# Patient Record
Sex: MALE | Race: BLACK OR AFRICAN AMERICAN | Employment: FULL TIME | URBAN - METROPOLITAN AREA
[De-identification: names, ages, dates, MRNs, and addresses within clinical notes are randomized per-mention and may not be internally consistent; named-entity substitution may affect disease eponyms.]

---

## 2017-01-18 ENCOUNTER — OFFICE VISIT (OUTPATIENT)
Dept: SLEEP MEDICINE | Age: 67
End: 2017-01-18

## 2017-01-18 VITALS
SYSTOLIC BLOOD PRESSURE: 114 MMHG | HEIGHT: 69 IN | DIASTOLIC BLOOD PRESSURE: 69 MMHG | WEIGHT: 177 LBS | OXYGEN SATURATION: 97 % | HEART RATE: 77 BPM | BODY MASS INDEX: 26.22 KG/M2 | TEMPERATURE: 97.9 F

## 2017-01-18 DIAGNOSIS — R41.3 POOR SHORT TERM MEMORY: ICD-10-CM

## 2017-01-18 DIAGNOSIS — G47.00 INSOMNIA, UNSPECIFIED TYPE: ICD-10-CM

## 2017-01-18 DIAGNOSIS — G47.33 OSA (OBSTRUCTIVE SLEEP APNEA): Primary | ICD-10-CM

## 2017-01-18 DIAGNOSIS — G31.84 MILD COGNITIVE IMPAIRMENT: ICD-10-CM

## 2017-01-18 NOTE — PATIENT INSTRUCTIONS
[address] [facility] [place]. [place], [address]  Tel.  [phone]  Fax. 100 [facility] 60  [place], [address] [facility]  Tel.  [phone]  Fax. [phone] [address]  Tel.  [phone]  Fax. [phone]     Sleep Apnea: After Your Visit  Your Care Instructions  Sleep apnea occurs when you frequently stop breathing for 10 seconds or longer during sleep. It can be mild to severe, based on the number of times per hour that you stop breathing or have slowed breathing. Blocked or narrowed airways in your nose, mouth, or throat can cause sleep apnea. Your airway can become blocked when your throat muscles and tongue relax during sleep. Sleep apnea is common, occurring in 1 out of 20 individuals. Individuals having any of the following characteristics should be evaluated and treated right away due to high risk and detrimental consequences from untreated sleep apnea:  1. Obesity  2. Congestive Heart failure  3. Atrial Fibrillation  4. Uncontrolled Hypertension  5. Type II Diabetes  6. Night-time Arrhythmias  7. Stroke  8. Pulmonary Hypertension  9. High-risk Driving Populations (pilots, truck drivers, etc.)  10. Patients Considering Weight-loss Surgery    How do you know you have sleep apnea? You probably have sleep apnea if you answer 'yes' to 3 or more of the following questions:  S - Have you been told that you Snore? T - Are you often Tired during the day? O - Has anyone Observed you stop breathing while sleeping? P- Do you have (or are being treated for) high blood Pressure? B - Are you obese (Body Mass Index > 35)? A - Is your Age 48years old or older? N - Is your Neck size greater than 16 inches? G - Are you male Gender? A sleep physician can prescribe a breathing device that prevents tissues in the throat from blocking your airway.  Or your doctor may recommend using a dental device (oral breathing device) to help keep your airway open. In some cases, surgery may be needed to remove enlarged tissues in the throat. Follow-up care is a key part of your treatment and safety. Be sure to make and go to all appointments, and call your doctor if you are having problems. It's also a good idea to know your test results and keep a list of the medicines you take. How can you care for yourself at home? · Lose weight, if needed. It may reduce the number of times you stop breathing or have slowed breathing. · Go to bed at the same time every night. · Sleep on your side. It may stop mild apnea. If you tend to roll onto your back, sew a pocket in the back of your pajama top. Put a tennis ball into the pocket, and stitch the pocket shut. This will help keep you from sleeping on your back. · Avoid alcohol and medicines such as sleeping pills and sedatives before bed. · Do not smoke. Smoking can make sleep apnea worse. If you need help quitting, talk to your doctor about stop-smoking programs and medicines. These can increase your chances of quitting for good. · Prop up the head of your bed 4 to 6 inches by putting bricks under the legs of the bed. · Treat breathing problems, such as a stuffy nose, caused by a cold or allergies. · Use a continuous positive airway pressure (CPAP) breathing machine if lifestyle changes do not help your apnea and your doctor recommends it. The machine keeps your airway from closing when you sleep. · If CPAP does not help you, ask your doctor whether you should try other breathing machines. A bilevel positive airway pressure machine has two types of air pressureâone for breathing in and one for breathing out. Another device raises or lowers air pressure as needed while you breathe. · If your nose feels dry or bleeds when using one of these machines, talk with your doctor about increasing moisture in the air. A humidifier may help.   · If your nose is runny or stuffy from using a breathing machine, talk with your doctor about using decongestants or a corticosteroid nasal spray. When should you call for help? Watch closely for changes in your health, and be sure to contact your doctor if:  · You still have sleep apnea even though you have made lifestyle changes. · You are thinking of trying a device such as CPAP. · You are having problems using a CPAP or similar machine. Where can you learn more? Go to [employer]. Enter N717 in the search box to learn more about \"Sleep Apnea: After Your Visit. \"   © 4156-0603 Healthwise, [employer]. Care instructions adapted under license by [employer] (which disclaims liability or warranty for this information). This care instruction is for use with your licensed healthcare professional. If you have questions about a medical condition or this instruction, always ask your healthcare professional. [place] [employer] any warranty or liability for your use of this information. PROPER SLEEP HYGIENE    What to avoid  · Do not have drinks with caffeine, such as coffee or black tea, for 8 hours before bed. · Do not smoke or use other types of tobacco near bedtime. Nicotine is a stimulant and can keep you awake. · Avoid drinking alcohol late in the evening, because it can cause you to wake in the middle of the night. · Do not eat a big meal close to bedtime. If you are hungry, eat a light snack. · Do not drink a lot of water close to bedtime, because the need to urinate may wake you up during the night. · Do not read or watch TV in bed. Use the bed only for sleeping and sexual activity. What to try  · Go to bed at the same time every night, and wake up at the same time every morning. Do not take naps during the day. · Keep your bedroom quiet, dark, and cool. · Get regular exercise, but not within 3 to 4 hours of your bedtime. .  · Sleep on a comfortable pillow and mattress.   · If watching the clock makes you anxious, turn it facing away from you so you cannot see the time. · If you worry when you lie down, start a worry book. Well before bedtime, write down your worries, and then set the book and your concerns aside. · Try meditation or other relaxation techniques before you go to bed. · If you cannot fall asleep, get up and go to another room until you feel sleepy. Do something relaxing. Repeat your bedtime routine before you go to bed again. · Make your house quiet and calm about an hour before bedtime. Turn down the lights, turn off the TV, log off the computer, and turn down the volume on music. This can help you relax after a busy day. Drowsy Driving  The [address] [place] [address] Road Traffic Safety Administration cites drowsiness as a causing factor in more than 748,075 police reported crashes annually, resulting in 76,000 injuries and 1,500 deaths. Other surveys suggest 55% of people polled have driven while drowsy in the past year, 23% had fallen asleep but not crashed, 3% crashed, and 2% had and accident due to drowsy driving. Who is at risk? Young Drivers: One study of drowsy driving accidents states that 55% of the drivers were under 25 years. Of those, 75% were male. Shift Workers and Travelers: People who work overnight or travel across time zones frequently are at higher risk of experiencing Circadian Rhythm Disorders. They are trying to work and function when their body is programed to sleep. Sleep Deprived: Lack of sleep has a serious impact on your ability to pay attention or focus on a task. Consistently getting less than the average of 8 hours your body needs creates partial or cumulative sleep deprivation. Untreated Sleep Disorders: Sleep Apnea, Narcolepsy, R.L.S., and other sleep disorders (untreated) prevent a person from getting enough restful sleep. This leads to excessive daytime sleepiness and increases the risk for drowsy driving accidents by up to 7 times.   Medications / Alcohol: Even over the counter medications can cause drowsiness. Medications that impair a drivers attention should have a warning label. Alcohol naturally makes you sleepy and on its own can cause accidents. Combined with excessive drowsiness its effects are amplified. Signs of Drowsy Driving:   * You don't remember driving the last few miles   * You may drift out of your lindsay   * You are unable to focus and your thoughts wander   * You may yawn more often than normal   * You have difficulty keeping your eyes open / nodding off   * Missing traffic signs, speeding, or tailgating  Prevention-   Good sleep hygiene, lifestyle and behavioral choices have the most impact on drowsy driving. There is no substitute for sleep and the average person requires 8 hours nightly. If you find yourself driving drowsy, stop and sleep. Consider the sleep hygiene tips provided during your visit as well. Medication Refill Policy: Refills for all medications require 1 week advance notice. Please have your pharmacy fax a refill request. We are unable to fax, or call in \"controled substance\" medications and you will need to pick these prescriptions up from our office. [employer] Activation    Thank you for requesting access to [employer]. Please follow the instructions below to securely access and download your online medical record. [employer] allows you to send messages to your doctor, view your test results, renew your prescriptions, schedule appointments, and more. How Do I Sign Up? 1. In your internet browser, go to [email or web address]. 2. Click on the First Time User? Click Here link in the Sign In box. You will see the New Member Sign Up page. 3. Enter your [employer] Access Code exactly as it appears below. You will not need to use this code after youve completed the sign-up process. If you do not sign up before the expiration date, you must request a new code.     [employer] Access Code: [email or web address]  Expires: 2/27/2017  2:52 PM (This is the date your [employer] access code will )    4. Enter the last four digits of your Social Security Number (xxxx) and Date of Birth (mm/dd/yyyy) as indicated and click Submit. You will be taken to the next sign-up page. 5. Create a [employer] ID. This will be your [employer] login ID and cannot be changed, so think of one that is secure and easy to remember. 6. Create a [employer] password. You can change your password at any time. 7. Enter your Password Reset Question and Answer. This can be used at a later time if you forget your password. 8. Enter your e-mail address. You will receive e-mail notification when new information is available in [address] 9. Click Sign Up. You can now view and download portions of your medical record. 10. Click the Download Summary menu link to download a portable copy of your medical information. Additional Information    If you have questions, please call [phone]. Remember, [employer] is NOT to be used for urgent needs. For medical emergencies, dial 911.

## 2017-01-18 NOTE — PROGRESS NOTES
[address] [facility] [address] [place]. [place], [address]  Tel.  [phone]  Fax. 100 [facility] 60  [place], [address] [facility]  Tel.  [phone]  Fax. [phone] [address] [place] [address] [place]  Tel.  [phone]  Fax. [phone]         Subjective:      Nydia Raines is an 61 y.o. male referred for evaluation for a sleep disorder. He complains of snoring associated with snorting, periods of not breathing. Symptoms began several years ago, unchanged since that time. He usually can fall asleep in 5 minutes. Family or house members note snoring, snorting, periods of not breathing. He denies completely or partially paralyzed while falling asleep or waking up. Nydia Raines does not wake up frequently at night. He is not bothered by waking up too early and left unable to get back to sleep. He actually sleeps about 5-6 hours at night and wakes up about 0-1 times during the night. He does not work shifts: Jeremiah Reasoner Ferol Lennox indicates he does get too little sleep at night. His bedtime is 2300. He awakens at 0500. He does take naps. He takes 7 naps a week lasting 1, 2 hours. He will have unintentional naps in the evening after return from work while viewing television. These have been occurring in the past 3 years and have been attributed to the process of aging. He has the following observed behaviors: Loud snoring, Light snoring, Pauses in breathing;  . Other remarks: waking with a gasp or snort. Patient was referred by neurology for evaluation due to history of snoring and cognitive impairment. He came in with the notion that he was scheduled for a sleep test tonight at this sleep center at 10:00 pm and insisted that he has paper work regarding this and it was left at home. Sleep testing is not performed at this center on Wednesday nights.  He later realized that this was incorrect and that the paper work he had turned in was for a consultation appointment and not a sleep test.    [place] Sleepiness Score: 4     No Known Allergies      Current Outpatient Prescriptions:     levothyroxine (SYNTHROID) 88 mcg tablet, Take 88 mcg by mouth Daily (before breakfast). , Disp: , Rfl:     atorvastatin (LIPITOR) 20 mg tablet, Take 20 mg by mouth nightly., Disp: , Rfl:      He  has a past medical history of Hypercholesteremia and Thyroid disease. He  has no past surgical history on file. He family history includes Cancer in his mother; No Known Problems in his father. He  reports that he has never smoked. He does not have any smokeless tobacco history on file. Review of Systems:  Constitutional:  No significant weight loss or weight gain  Eyes:  No blurred vision. CVS:  No significant chest pain  Pulm:  No significant shortness of breath  GI:  No significant nausea or vomiting  :  No significant nocturia  Musculoskeletal:  No significant joint pain at night  Skin:  No significant rashes  Neuro:  No significant dizziness   Psych:  No active mood issues    Sleep Review of Systems: notable for no difficulty falling asleep; infrequent awakenings at night;  regular dreaming noted; no nightmares ; no early morning headaches;  memory problems; no concentration issues; no history of any automobile or occupational accidents due to daytime drowsiness. Objective:     Visit Vitals    /69    Pulse 77    Temp 97.9 °F (36.6 °C)    Ht 5' 9\" (1.753 m)    Wt 177 lb (80.3 kg)    SpO2 97%    BMI 26.14 kg/m2         General:   Not in acute distress   Eyes:  Anicteric sclerae, no obvious strabismus   Nose:  No obvious nasal septum deviation    Oropharynx:   Class 4 oropharyngeal outlet, thick tongue base, uvula could not be seen due to low-lying soft palate, narrow tonsilo-pharyngeal pilars   Tonsils:   tonsils are not seen due to low-lying soft palate   Neck:   Neck circ.  in \"inches\": 15; midline trachea   Chest/Lungs:  Equal lung expansion, clear on auscultation    CVS:  Normal rate, regular rhythm; no JVD   Skin:  Warm to touch; no obvious rashes   Neuro:  No focal deficits ; no obvious tremor    Psych:  Normal affect,  normal countenance;          Assessment:       ICD-10-CM ICD-9-CM    1. JOSE CARLOS (obstructive sleep apnea) G47.33 327.23 POLYSOMNOGRAPHY 1 NIGHT      CANCELED: SLEEP STUDY UNATTENDED, 4 CHANNEL   2. Insomnia, unspecified type G47.00 780.52    3. Mild cognitive impairment G31.84 331.83 POLYSOMNOGRAPHY 1 NIGHT   4. Poor short term memory R41.3 780.93    5. BMI 26.0-26.9,adult Z68.26 V85.22          Plan:     * The patient currently has a Moderate Risk for having sleep apnea. STOP-BANG score 4.  * Attended Sleep testing was ordered for initial evaluation due to memory and cognitive impairment issues. * He was provided information on sleep apnea including coresponding risk factors and the importance of proper treatment. * Treatment options if indicated were reviewed today. Patient agrees to a trial of PAP therapy if indicated. * Counseling was provided regarding proper sleep hygiene (including effect of light on sleep) and safe driving. * Effect of sleep disturbance on weight was reviewed. We have recommended a dedicated weight loss through appropriate diet and an exercise regiment as significant weight reduction has been shown to reduce severity of obstructive sleep apnea. * Telephone [phone]  follow-up shortly after sleep study to review results and plan final management.     (patient has given permission for a message to be left regarding test results and further management if patient cannot be cannot be reached directly). Thank you for allowing us to participate in your patient's medical care. We'll keep you updated on these investigations. Nance Duverney, MD, FAASM  Diplomate American Board of Sleep Medicine  Diplomate in Sleep Medicine - ABP  Electronically signed.

## 2017-02-24 ENCOUNTER — HOSPITAL ENCOUNTER (OUTPATIENT)
Dept: SLEEP MEDICINE | Age: 67
Discharge: HOME | End: 2017-02-24
Payer: COMMERCIAL

## 2017-02-24 DIAGNOSIS — G31.84 MILD COGNITIVE IMPAIRMENT: ICD-10-CM

## 2017-02-24 DIAGNOSIS — G47.33 OSA (OBSTRUCTIVE SLEEP APNEA): ICD-10-CM

## 2017-02-24 PROCEDURE — 95810 POLYSOM 6/> YRS 4/> PARAM: CPT

## 2017-02-25 VITALS
SYSTOLIC BLOOD PRESSURE: 124 MMHG | TEMPERATURE: 98.5 F | OXYGEN SATURATION: 98 % | DIASTOLIC BLOOD PRESSURE: 80 MMHG | HEIGHT: 69 IN | HEART RATE: 85 BPM | BODY MASS INDEX: 26.16 KG/M2 | WEIGHT: 176.6 LBS

## 2017-02-28 ENCOUNTER — TELEPHONE (OUTPATIENT)
Dept: SLEEP MEDICINE | Age: 67
End: 2017-02-28

## 2017-02-28 DIAGNOSIS — R06.83 SNORING: Primary | ICD-10-CM

## 2017-03-02 ENCOUNTER — DOCUMENTATION ONLY (OUTPATIENT)
Dept: SLEEP MEDICINE | Age: 67
End: 2017-03-02

## 2017-03-02 ENCOUNTER — TELEPHONE (OUTPATIENT)
Dept: SLEEP MEDICINE | Age: 67
End: 2017-03-02

## 2017-03-29 ENCOUNTER — OFFICE VISIT (OUTPATIENT)
Dept: NEUROLOGY | Age: 67
End: 2017-03-29

## 2017-03-29 VITALS
WEIGHT: 180.2 LBS | SYSTOLIC BLOOD PRESSURE: 118 MMHG | DIASTOLIC BLOOD PRESSURE: 60 MMHG | OXYGEN SATURATION: 98 % | BODY MASS INDEX: 26.69 KG/M2 | HEART RATE: 83 BPM | HEIGHT: 69 IN

## 2017-03-29 DIAGNOSIS — E03.9 HYPOTHYROIDISM, UNSPECIFIED TYPE: ICD-10-CM

## 2017-03-29 DIAGNOSIS — R06.83 SNORING: ICD-10-CM

## 2017-03-29 DIAGNOSIS — G31.84 MILD COGNITIVE IMPAIRMENT: Primary | ICD-10-CM

## 2017-03-29 DIAGNOSIS — R73.03 PREDIABETES: ICD-10-CM

## 2017-03-29 NOTE — MR AVS SNAPSHOT
Visit Information Date & Time Provider Department Dept. Phone Encounter #  
 3/29/2017  2:40 PM Apoorva Catalan MD [facility] [phone] [id number] Upcoming Health Maintenance Date Due Hepatitis C Screening 1950 DTaP/Tdap/Td series (1 - Tdap) 7/23/1973 FOBT Q 1 YEAR AGE 50-75 7/23/2002 ZOSTER VACCINE AGE 60> 7/23/2012 INFLUENZA AGE 9 TO ADULT 8/1/2016 Allergies as of 3/29/2017  Review Complete On: 3/29/2017 By: Ave Catalan No Known Allergies Current Immunizations  Never Reviewed No immunizations on file. Not reviewed this visit Vitals BP Pulse Height(growth percentile) Weight(growth percentile) SpO2 BMI  
 118/60 83 5' 9\" (1.753 m) 180 lb 3.2 oz (81.7 kg) 98% 26.61 kg/m2 Smoking Status Never Smoker BMI and BSA Data Body Mass Index Body Surface Area  
 26.61 kg/m 2 1.99 m 2 Preferred Pharmacy Pharmacy Name Phone [phone] [phone] Your Updated Medication List  
  
   
This list is accurate as of: 3/29/17  3:24 PM.  Always use your most recent med list.  
  
  
  
  
 LIPITOR 20 mg tablet Generic drug:  atorvastatin Take 20 mg by mouth nightly. SYNTHROID 88 mcg tablet Generic drug:  levothyroxine Take 88 mcg by mouth Daily (before breakfast). Patient Instructions 1. Neuropsychological testing 2. Follow-up after neuropsychological testing A Healthy Lifestyle: Care Instructions Your Care Instructions A healthy lifestyle can help you feel good, stay at a healthy weight, and have plenty of energy for both work and play. A healthy lifestyle is something you can share with your whole family. A healthy lifestyle also can lower your risk for serious health problems, such as high blood pressure, heart disease, and diabetes. You can follow a few steps listed below to improve your health and the health of your family. Follow-up care is a key part of your treatment and safety. Be sure to make and go to all appointments, and call your doctor if you are having problems. Its also a good idea to know your test results and keep a list of the medicines you take. How can you care for yourself at home? · Do not eat too much sugar, fat, or fast foods. You can still have dessert and treats now and then. The goal is moderation. · Start small to improve your eating habits. Pay attention to portion sizes, drink less juice and soda pop, and eat more fruits and vegetables. ¨ Eat a healthy amount of food. A 3-ounce serving of meat, for example, is about the size of a deck of cards. Fill the rest of your plate with vegetables and whole grains. ¨ Limit the amount of soda and sports drinks you have every day. Drink more water when you are thirsty. ¨ Eat at least 5 servings of fruits and vegetables every day. It may seem like a lot, but it is not hard to reach this goal. A serving or helping is 1 piece of fruit, 1 cup of vegetables, or 2 cups of leafy, raw vegetables. Have an apple or some carrot sticks as an afternoon snack instead of a candy bar. Try to have fruits and/or vegetables at every meal. 
· Make exercise part of your daily routine. You may want to start with simple activities, such as walking, bicycling, or slow swimming. Try to be active 30 to 60 minutes every day. You do not need to do all 30 to 60 minutes all at once. For example, you can exercise 3 times a day for 10 or 20 minutes. Moderate exercise is safe for most people, but it is always a good idea to talk to your doctor before starting an exercise program. 
· Keep moving. Alison Solis the lawn, work in the garden, or [place]. Take the stairs instead of the elevator at work. · If you smoke, quit.  People who smoke have an increased risk for heart attack, stroke, cancer, and other lung illnesses. Quitting is hard, but there are ways to boost your chance of quitting tobacco for good. ¨ Use nicotine gum, patches, or lozenges. ¨ Ask your doctor about stop-smoking programs and medicines. ¨ Keep trying. In addition to reducing your risk of diseases in the future, you will notice some benefits soon after you stop using tobacco. If you have shortness of breath or asthma symptoms, they will likely get better within a few weeks after you quit. · Limit how much alcohol you drink. Moderate amounts of alcohol (up to 2 drinks a day for men, 1 drink a day for women) are okay. But drinking too much can lead to liver problems, high blood pressure, and other health problems. Family health If you have a family, there are many things you can do together to improve your health. · Eat meals together as a family as often as possible. · Eat healthy foods. This includes fruits, vegetables, lean meats and dairy, and whole grains. · Include your family in your fitness plan. Most people think of activities such as jogging or tennis as the way to fitness, but there are many ways you and your family can be more active. Anything that makes you breathe hard and gets your heart pumping is exercise. Here are some tips: 
¨ Walk to do errands or to take your child to school or the bus. ¨ Go for a family bike ride after dinner instead of watching TV. Where can you learn more? Go to http://leanna-carly.info/. Enter G774 in the search box to learn more about \"A Healthy Lifestyle: Care Instructions. \" Current as of: July 26, 2016 Content Version: 11.2 © 0466-8816 [employer]. Care instructions adapted under license by [employer] (which disclaims liability or warranty for this information).  If you have questions about a medical condition or this instruction, always ask your healthcare professional. [employer] disclaims any warranty or liability for your use of this information. Introducing [facility]! Nabila Boothe introduces [place] [employer] patient portal. Now you can access parts of your medical record, email your doctor's office, and request medication refills online. 1. In your internet browser, go to [email or web address] 2. Click on the First Time User? Click Here link in the Sign In box. You will see the New Member Sign Up page. 3. Enter your [place] [employer] Access Code exactly as it appears below. You will not need to use this code after youve completed the sign-up process. If you do not sign up before the expiration date, you must request a new code. · [place] [employer] Access Code: [email or web address] Expires: 6/9/2017  9:46 AM 
 
4. Enter the last four digits of your Social Security Number (xxxx) and Date of Birth (mm/dd/yyyy) as indicated and click Submit. You will be taken to the next sign-up page. 5. Create a [place] [employer] ID. This will be your [place] [employer] login ID and cannot be changed, so think of one that is secure and easy to remember. 6. Create a [place] [employer] password. You can change your password at any time. 7. Enter your Password Reset Question and Answer. This can be used at a later time if you forget your password. 8. Enter your e-mail address. You will receive e-mail notification when new information is available in [address] 9. Click Sign Up. You can now view and download portions of your medical record. 10. Click the Download Summary menu link to download a portable copy of your medical information. If you have questions, please visit the Frequently Asked Questions section of the [employer] [employer] website. Remember, [employer] [employer] is NOT to be used for urgent needs. For medical emergencies, dial 911. Now available from your iPhone and Android! Please provide this summary of care documentation to your next provider. Your primary care clinician is listed as ISIDRO Saldana. If you have any questions after today's visit, please call [phone].

## 2017-03-29 NOTE — PATIENT INSTRUCTIONS
1.  Neuropsychological testing  2. Follow-up after neuropsychological testing      A Healthy Lifestyle: Care Instructions  Your Care Instructions  A healthy lifestyle can help you feel good, stay at a healthy weight, and have plenty of energy for both work and play. A healthy lifestyle is something you can share with your whole family. A healthy lifestyle also can lower your risk for serious health problems, such as high blood pressure, heart disease, and diabetes. You can follow a few steps listed below to improve your health and the health of your family. Follow-up care is a key part of your treatment and safety. Be sure to make and go to all appointments, and call your doctor if you are having problems. Its also a good idea to know your test results and keep a list of the medicines you take. How can you care for yourself at home? · Do not eat too much sugar, fat, or fast foods. You can still have dessert and treats now and then. The goal is moderation. · Start small to improve your eating habits. Pay attention to portion sizes, drink less juice and soda pop, and eat more fruits and vegetables. ¨ Eat a healthy amount of food. A 3-ounce serving of meat, for example, is about the size of a deck of cards. Fill the rest of your plate with vegetables and whole grains. ¨ Limit the amount of soda and sports drinks you have every day. Drink more water when you are thirsty. ¨ Eat at least 5 servings of fruits and vegetables every day. It may seem like a lot, but it is not hard to reach this goal. A serving or helping is 1 piece of fruit, 1 cup of vegetables, or 2 cups of leafy, raw vegetables. Have an apple or some carrot sticks as an afternoon snack instead of a candy bar. Try to have fruits and/or vegetables at every meal.  · Make exercise part of your daily routine. You may want to start with simple activities, such as walking, bicycling, or slow swimming. Try to be active 30 to 60 minutes every day.  You do not need to do all 30 to 60 minutes all at once. For example, you can exercise 3 times a day for 10 or 20 minutes. Moderate exercise is safe for most people, but it is always a good idea to talk to your doctor before starting an exercise program.  · Keep moving. Eben Gut the lawn, work in the garden, or [employer]. Take the stairs instead of the elevator at work. · If you smoke, quit. People who smoke have an increased risk for heart attack, stroke, cancer, and other lung illnesses. Quitting is hard, but there are ways to boost your chance of quitting tobacco for good. ¨ Use nicotine gum, patches, or lozenges. ¨ Ask your doctor about stop-smoking programs and medicines. ¨ Keep trying. In addition to reducing your risk of diseases in the future, you will notice some benefits soon after you stop using tobacco. If you have shortness of breath or asthma symptoms, they will likely get better within a few weeks after you quit. · Limit how much alcohol you drink. Moderate amounts of alcohol (up to 2 drinks a day for men, 1 drink a day for women) are okay. But drinking too much can lead to liver problems, high blood pressure, and other health problems. Family health  If you have a family, there are many things you can do together to improve your health. · Eat meals together as a family as often as possible. · Eat healthy foods. This includes fruits, vegetables, lean meats and dairy, and whole grains. · Include your family in your fitness plan. Most people think of activities such as jogging or tennis as the way to fitness, but there are many ways you and your family can be more active. Anything that makes you breathe hard and gets your heart pumping is exercise. Here are some tips:  ¨ Walk to do errands or to take your child to school or the bus. ¨ Go for a family bike ride after dinner instead of watching TV. Where can you learn more? Go to http://leanna-carly.info/.   Enter C041 in the search box to learn more about \"A Healthy Lifestyle: Care Instructions. \"  Current as of: July 26, 2016  Content Version: 11.2  © 9865-3346 [employer]. Care instructions adapted under license by [employer] (which disclaims liability or warranty for this information). If you have questions about a medical condition or this instruction, always ask your healthcare professional. [address] any warranty or liability for your use of this information.

## 2017-03-29 NOTE — PROGRESS NOTES
NEUROLOGY follow-up appointment    03/29/17    Chief Complaint   Patient presents with    Follow-up     Memory Loss       Subjective  Renee Mccarthy is a 61 y.o. male who presented to the neurology office for memory problems. To recap, his memory problems started a few years ago and it has been gradually progressive. It does not interfere with his day-to-day activity but at times he is in the kitchen and he has to think twice with things are kept in the kitchen. He is very good with directions but recently takes him more effort to figure the directions. He does not have any problems with names. He works and does not have any problems with work. He is in a managerial position. He does not have any problems using his phone or a remote control. He or his wife has not noticed any changes in his personality. He sleeps 5 hours and he does snore at night and the wife does state that he sometimes stops breathing at night. He did have a sleep study since the last visit and it was negative for sleep apnea. He does also have hypothyroidism. He did have blood work and thyroid function test was normal.    Since her last visit, patient did have MRI of the brain which was normal.  Patient has not undergone neuropsychological testing yet. The memory stable since last visit. Current Outpatient Prescriptions   Medication Sig    levothyroxine (SYNTHROID) 88 mcg tablet Take 88 mcg by mouth Daily (before breakfast).  atorvastatin (LIPITOR) 20 mg tablet Take 20 mg by mouth nightly. No current facility-administered medications for this visit.       REVIEW OF SYSTEMS:   A ten system review of constitutional, cardiovascular, respiratory, musculoskeletal, endocrine, skin, SHEENT, genitourinary, psychiatric and neurologic systems was obtained and is unremarkable with the exception of the following: Memory problems, hypothyroidism     EXAMINATION:   Visit Vitals    /60    Pulse 83    Ht 5' 9\" (1.753 m)  Wt 180 lb 3.2 oz (81.7 kg)    SpO2 98%    BMI 26.61 kg/m2        General:   General appearance: Pt is in no acute distress   Distal pulses are preserved    Neurological Examination:   Mental Status: AAO x3. Speech is fluent. Follows commands, has normal fund of knowledge, attention, short term recall, comprehension and insight. Cranial Nerves: Visual fields are full. PERRL, Extraocular movements are full. Facial sensation intact V1- V3. Facial movement intact, symmetric. Hearing intact to conversation. Palate elevates symmetrically. Shoulder shrug symmetric. Tongue midline. Motor: Strength is 5/5 in all 4 ext. No atrophy. Tone: Normal    Sensation: Normal to light touch    Coordination/Cerebellar: Intact to finger-nose-finger     Gait: Romberg is negative and casual gait is normal.     Skin: No significant bruising or lacerations.     Mini Mental State Exam 2016   What is the Year 1   What is the Season 1   What is the Date 1   What is the Day 1   What is the Month 1   Where are we State 1   Where are we Country 1   Where are we [address] [place] [address] or [facility] 1   [facility] are we Floor 1   Name three objects, then ask the patient to say them 3   Serial sevens Subtract 7 from 100 in increments 5   Ask for the three objects repeated above 1   Name a pencil 1   Name a watch 1   Have the patient repeat this phrase \"No ifs, ands, or buts\" 1   Three stage command: Take the paper in your right hand 1   Fold the paper in half 1   Put the paper on the floor 1   Read and obey the followin [facility] 1   Have the patient write a sentence 1   Have the patient copy a figure 1   Mini Mental Score 28     Laboratory review:   Results for orders placed or performed in visit on 16   VITAMIN B12   Result Value Ref Range    Vitamin B12 711 211 - 946 pg/mL   TSH AND FREE T4   Result Value Ref Range    TSH 2.690 0.450 - 4.500 uIU/mL    T4, Free 1.14 0.82 - 1.77 ng/dL   METHYLMALONIC ACID   Result Value Ref Range METHYLMALONIC ACID, SERUM 106 0 - 378 nmol/L   HEMOGLOBIN A1C W/O EAG   Result Value Ref Range    Hemoglobin A1c 5.9 (H) 4.8 - 5.6 %   METABOLIC PANEL, COMPREHENSIVE   Result Value Ref Range    Glucose 92 65 - 99 mg/dL    BUN 10 8 - 27 mg/dL    Creatinine 1.10 0.76 - 1.27 mg/dL    GFR est non-AA 71 >59 mL/min/1.73    GFR est AA 82 >59 mL/min/1.73    BUN/Creatinine ratio 9 (L) 10 - 22    Sodium 139 136 - 144 mmol/L    Potassium 4.1 3.5 - 5.2 mmol/L    Chloride 99 97 - 106 mmol/L    CO2 27 18 - 29 mmol/L    Calcium 9.3 8.6 - 10.2 mg/dL    Protein, total 7.4 6.0 - 8.5 g/dL    Albumin 4.6 3.6 - 4.8 g/dL    GLOBULIN, TOTAL 2.8 1.5 - 4.5 g/dL    A-G Ratio 1.6 1.1 - 2.5    Bilirubin, total 0.6 0.0 - 1.2 mg/dL    Alk. phosphatase 47 39 - 117 IU/L    AST (SGOT) 26 0 - 40 IU/L    ALT (SGPT) 19 0 - 44 IU/L   CBC WITH AUTOMATED DIFF   Result Value Ref Range    WBC 3.9 3.4 - 10.8 x10E3/uL    RBC 4.22 4.14 - 5.80 x10E6/uL    HGB 13.8 12.6 - 17.7 g/dL    HCT 40.3 37.5 - 51.0 %    MCV 96 79 - 97 fL    MCH 32.7 26.6 - 33.0 pg    MCHC 34.2 31.5 - 35.7 g/dL    RDW 13.9 12.3 - 15.4 %    PLATELET 473 930 - 561 x10E3/uL    NEUTROPHILS 35 %    Lymphocytes 51 %    MONOCYTES 10 %    EOSINOPHILS 3 %    BASOPHILS 1 %    ABS. NEUTROPHILS 1.3 (L) 1.4 - 7.0 x10E3/uL    Abs Lymphocytes 2.0 0.7 - 3.1 x10E3/uL    ABS. MONOCYTES 0.4 0.1 - 0.9 x10E3/uL    ABS. EOSINOPHILS 0.1 0.0 - 0.4 x10E3/uL    ABS. BASOPHILS 0.0 0.0 - 0.2 x10E3/uL    IMMATURE GRANULOCYTES 0 %    ABS. IMM. GRANS. 0.0 0.0 - 0.1 x10E3/uL       Imaging review:  12/4/2016  MRI of the brain without contrast  Normal  I personally reviewed the images in PACS and this is my impression. 2/24/2017  Sleep study  No evidence of significant sleep apnea. Snoring disorder. Documentation review:  I did review the notes from Dr. Sukhdeep Awad. The patient's [place] sleepiness score is 4. The patient does have history of hypercholesterolemia and thyroid disease.   Review of system is normal.  The patient has regular dreaming noted. The patient does have memory problems. On examination patient's neck circumference is 15 inches. Patient does have mild cognitive impairment and will proceed with sleep study. Assessment/Plan:   Emily Villalta is a 61 y.o. male who presented to the neurology office for management of memory problems. He has been having mild problems such as remembering where he has kept things. In the office he did not remember his date of marriage and was not able to remember her daughter-in-law's name. He does seem to have mild cognitive impairment. He did have MRI of the brain which are reviewed and that is normal.  He did also have blood work and a sleep study and they were normal as well. I do plan to send him for neuropsychological testing for detailed evaluation of his memory. His blood work did show HbA1c of 5.9 and does have prediabetes. I counseled him regarding watching his diet and exercise. Follow-up after the neuropsychological testing. ICD-10-CM ICD-9-CM    1. Mild cognitive impairment G31.84 331.83    2. Snoring R06.83 786.09    3. Hypothyroidism, unspecified type E03.9 244.9    4. Prediabetes R73.03 790.29         Lali Bates MD  Neurologist and Clinical Neurophysiologist    CC: Lucie Woodruff MD  Fax: [phone]    This note will not be viewable in [address]

## 2017-03-29 NOTE — LETTER
3/29/2017 3:34 PM 
 
Patient:    Sheryl Beard YOB: 1950 Date of Visit:    3/29/2017 Dear Joi Ro MD 
 
Thank you for referring Mr. Sheryl Beard to me for evaluation/treatment. Below are the relevant portions of my assessment and plan of care. NEUROLOGY follow-up appointment 03/29/17 Chief Complaint Patient presents with  Follow-up Memory Loss Subjective Sheryl Beard is a 61 y.o. male who presented to the neurology office for memory problems. To recap, his memory problems started a few years ago and it has been gradually progressive. It does not interfere with his day-to-day activity but at times he is in the kitchen and he has to think twice with things are kept in the kitchen. He is very good with directions but recently takes him more effort to figure the directions. He does not have any problems with names. He works and does not have any problems with work. He is in a managerial position. He does not have any problems using his phone or a remote control. He or his wife has not noticed any changes in his personality. He sleeps 5 hours and he does snore at night and the wife does state that he sometimes stops breathing at night. He did have a sleep study since the last visit and it was negative for sleep apnea. He does also have hypothyroidism. He did have blood work and thyroid function test was normal. 
 
Since her last visit, patient did have MRI of the brain which was normal.  Patient has not undergone neuropsychological testing yet. The memory stable since last visit. Current Outpatient Prescriptions Medication Sig  levothyroxine (SYNTHROID) 88 mcg tablet Take 88 mcg by mouth Daily (before breakfast).  atorvastatin (LIPITOR) 20 mg tablet Take 20 mg by mouth nightly. No current facility-administered medications for this visit.    
 
REVIEW OF SYSTEMS:  
 A ten system review of constitutional, cardiovascular, respiratory, musculoskeletal, endocrine, skin, SHEENT, genitourinary, psychiatric and neurologic systems was obtained and is unremarkable with the exception of the following: Memory problems, hypothyroidism EXAMINATION:  
Visit Vitals  /60  Pulse 83  Ht 5' 9\" (1.753 m)  Wt 180 lb 3.2 oz (81.7 kg)  SpO2 98%  BMI 26.61 kg/m2 General:  
General appearance: Pt is in no acute distress Distal pulses are preserved Neurological Examination:  
Mental Status: AAO x3. Speech is fluent. Follows commands, has normal fund of knowledge, attention, short term recall, comprehension and insight. Cranial Nerves: Visual fields are full. PERRL, Extraocular movements are full. Facial sensation intact V1- V3. Facial movement intact, symmetric. Hearing intact to conversation. Palate elevates symmetrically. Shoulder shrug symmetric. Tongue midline. Motor: Strength is 5/5 in all 4 ext. No atrophy. Tone: Normal 
 
Sensation: Normal to light touch Coordination/Cerebellar: Intact to finger-nose-finger Gait: Romberg is negative and casual gait is normal.  
 
Skin: No significant bruising or lacerations. Mini Mental State Exam 11/29/2016 What is the Year 1 What is the Season 1 What is the Date 1 What is the Day 1 What is the Month 1 Where are we State 1 Where are we Country 1 Where are we [place] [facility] 1 Whree are we Floor 1 Name three objects, then ask the patient to say them 3 Serial sevens Subtract 7 from 100 in increments 5 Ask for the three objects repeated above 1 Name a pencil 1 Name a watch 1 Have the patient repeat this phrase \"No ifs, ands, or buts\" 1 Three stage command: Take the paper in your right hand 1 Fold the paper in half 1 Put the paper on the floor 1 Read and obey the following: CLOSE YOUR EYES 1 Have the patient write a sentence 1 Have the patient copy a figure 1 Mini Mental Score 28 Laboratory review:  
Results for orders placed or performed in visit on 11/29/16 VITAMIN B12 Result Value Ref Range Vitamin B12 711 211 - 946 pg/mL TSH AND FREE T4 Result Value Ref Range TSH 2.690 0.450 - 4.500 uIU/mL T4, Free 1.14 0.82 - 1.77 ng/dL METHYLMALONIC ACID Result Value Ref Range METHYLMALONIC ACID, SERUM 106 0 - 378 nmol/L  
HEMOGLOBIN A1C W/O EAG Result Value Ref Range Hemoglobin A1c 5.9 (H) 4.8 - 5.6 % METABOLIC PANEL, COMPREHENSIVE Result Value Ref Range Glucose 92 65 - 99 mg/dL BUN 10 8 - 27 mg/dL Creatinine 1.10 0.76 - 1.27 mg/dL GFR est non-AA 71 >59 mL/min/1.73 GFR est AA 82 >59 mL/min/1.73  
 BUN/Creatinine ratio 9 (L) 10 - 22 Sodium 139 136 - 144 mmol/L Potassium 4.1 3.5 - 5.2 mmol/L Chloride 99 97 - 106 mmol/L  
 CO2 27 18 - 29 mmol/L Calcium 9.3 8.6 - 10.2 mg/dL Protein, total 7.4 6.0 - 8.5 g/dL Albumin 4.6 3.6 - 4.8 g/dL GLOBULIN, TOTAL 2.8 1.5 - 4.5 g/dL A-G Ratio 1.6 1.1 - 2.5 Bilirubin, total 0.6 0.0 - 1.2 mg/dL Alk. phosphatase 47 39 - 117 IU/L  
 AST (SGOT) 26 0 - 40 IU/L  
 ALT (SGPT) 19 0 - 44 IU/L  
CBC WITH AUTOMATED DIFF Result Value Ref Range WBC 3.9 3.4 - 10.8 x10E3/uL  
 RBC 4.22 4.14 - 5.80 x10E6/uL HGB 13.8 12.6 - 17.7 g/dL HCT 40.3 37.5 - 51.0 % MCV 96 79 - 97 fL  
 MCH 32.7 26.6 - 33.0 pg  
 MCHC 34.2 31.5 - 35.7 g/dL  
 RDW 13.9 12.3 - 15.4 % PLATELET 172 304 - 723 x10E3/uL NEUTROPHILS 35 % Lymphocytes 51 % MONOCYTES 10 % EOSINOPHILS 3 % BASOPHILS 1 %  
 ABS. NEUTROPHILS 1.3 (L) 1.4 - 7.0 x10E3/uL Abs Lymphocytes 2.0 0.7 - 3.1 x10E3/uL  
 ABS. MONOCYTES 0.4 0.1 - 0.9 x10E3/uL  
 ABS. EOSINOPHILS 0.1 0.0 - 0.4 x10E3/uL  
 ABS. BASOPHILS 0.0 0.0 - 0.2 x10E3/uL IMMATURE GRANULOCYTES 0 %  
 ABS. IMM. GRANS. 0.0 0.0 - 0.1 x10E3/uL Imaging review: 
12/4/2016 MRI of the brain without contrast 
Normal 
 I personally reviewed the images in PACS and this is my impression. 2/24/2017 Sleep study No evidence of significant sleep apnea. Snoring disorder. Documentation review: I did review the notes from Dr. Braulio Bonilla. The patient's [place] sleepiness score is 4. The patient does have history of hypercholesterolemia and thyroid disease. Review of system is normal.  The patient has regular dreaming noted. The patient does have memory problems. On examination patient's neck circumference is 15 inches. Patient does have mild cognitive impairment and will proceed with sleep study. Assessment/Plan:  
Gamaliel Luo is a 61 y.o. male who presented to the neurology office for management of memory problems. He has been having mild problems such as remembering where he has kept things. In the office he did not remember his date of marriage and was not able to remember her daughter-in-law's name. He does seem to have mild cognitive impairment. He did have MRI of the brain which are reviewed and that is normal.  He did also have blood work and a sleep study and they were normal as well. I do plan to send him for neuropsychological testing for detailed evaluation of his memory. His blood work did show HbA1c of 5.9 and does have prediabetes. I counseled him regarding watching his diet and exercise. Follow-up after the neuropsychological testing. ICD-10-CM ICD-9-CM 1. Mild cognitive impairment G31.84 331.83   
2. Snoring R06.83 786.09   
3. Hypothyroidism, unspecified type E03.9 244.9 4. Prediabetes R73.03 790.29 Montserrat Petit MD 
Neurologist and Clinical Neurophysiologist 
 
CC: Demetrio Bansal MD 
Fax: [phone] This note will not be viewable in [address] If you have questions, please do not hesitate to call me. I look forward to following Mr. Johns along with you. Sincerely, Montserrat Petit MD

## 2017-04-05 ENCOUNTER — OFFICE VISIT (OUTPATIENT)
Dept: NEUROLOGY | Age: 67
End: 2017-04-05

## 2017-04-05 DIAGNOSIS — E03.9 HYPOTHYROIDISM, UNSPECIFIED TYPE: ICD-10-CM

## 2017-04-05 DIAGNOSIS — R41.3 MEMORY LOSS: Primary | ICD-10-CM

## 2017-04-05 DIAGNOSIS — Z56.6 STRESS AT WORK: ICD-10-CM

## 2017-04-05 DIAGNOSIS — R06.83 SNORING: ICD-10-CM

## 2017-04-05 DIAGNOSIS — R73.03 PREDIABETES: ICD-10-CM

## 2017-04-05 DIAGNOSIS — F43.22 ADJUSTMENT DISORDER WITH ANXIETY: ICD-10-CM

## 2017-04-05 SDOH — HEALTH STABILITY - MENTAL HEALTH: OTHER PHYSICAL AND MENTAL STRAIN RELATED TO WORK: Z56.6

## 2017-04-05 NOTE — PROGRESS NOTES
[address] [facility],[address]  [address] [facility]   [address] [facility] [address] [facility]   [place] David   [phone] Office   [phone] Fax      Neuropsychology    Initial Diagnostic Interview Note      Referral:  Shade Williamson MD, Dr. ChildressLindseyjay Seymour is a 61 y.o. right handed  male who was accompanied by his spouse to the initial clinical interview on 4/5/17 . Please refer to his medical records for details pertaining to his history. Briefly, the patient reported that he completed college without history of previously diagnosed LD and/or receipt of special education services. 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 He has noticed a progressive memory decline starting about a few years ago and getting worse. He does not notice any major difficulties. Per the spouse, in the last year, there has been a progressive decline in short term memory. They will discuss something and he forgets that conversation. Misplaces things. Starts tasks and does not complete. She has to tell him the same thing more than once. His plate is really full, does a lot of different things. Will play piano and misplaces. No problems with driving. Spouse says he has trouble figuring out directions. He is good with names. He used to be excellent with driving directions. Independent for driving, medications, finances, day-to-day chores, etc. Uses GPS now, and didn't have to do things like that before. Balance is okay. He has a history of head injury about 30 years ago when playing basketball and got elbowed in the back of his head. Had to have surgery for that. No major residua right after that incident. No other major known neurologic history. No problems learning to use new devices. He does not have any problems with names. He works and does not have any problems with work. He is in a managerial position. He does not have any problems using his phone or a remote control. He snores at night gets five hours tops, and stops breathing occasionally. He had a sleep study done which was negative for apnea. He does also have hypothyroidism. He did have blood work and thyroid function test was normal.  MRI was normal.  Is trying to balance a lot of different things. 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 No counseling or psychiatrist.  Both of his parents are living in their late 80s and he has one brother. Has a daughter that is causing a lot of stress and puts her problems on them. Financial stressors in that regard, and needs personal assistance and arguing with her children, and this is stresful. Daughter is in her 45s and grandchildren are 12 and 24. He does have a family history of dementia - uncles and aunts. Appetite is okay. No previous neuropsych testing. MMSE:  26/30  Recall 1/3  Spelling 4/5  Not oriented to city     Clock:  Normal  .  TOPF: 34     Historian: Good  Praxis: No UE apraxia  R/L Orientation: Intact to self and to other  Dress: within normal limits   Weight: within normal limits   Appearance/Hygiene: within normal limits   Gait: within normal limits   Assistive Devices: Glasses  Mood: within normal limits   Affect: within normal limits   Comprehension: within normal limits   Thought Process: within normal limits   Expressive Language: within normal limits   Receptive Language: within normal limits   Motor:  No cognitive or motor perseveration  ETOH:  Denied  Tobacco: Denied  Illicit: Denied  SI/HI: Denied  Psychosis: Denied  Insight: Within normal limits  Judgment: Within normal limits  Other Psych:      Past Medical History:   Diagnosis Date    Hypercholesteremia     Thyroid disease     hypothyroid       History reviewed. No pertinent surgical history.     No Known Allergies    Family History   Problem Relation Age of Onset    Cancer Mother     No Known Problems Father        Social History   Substance Use Topics    Smoking status: Never Smoker    Smokeless tobacco: None    Alcohol use None       Current Outpatient Prescriptions   Medication Sig Dispense Refill    levothyroxine (SYNTHROID) 88 mcg tablet Take 88 mcg by mouth Daily (before breakfast).  atorvastatin (LIPITOR) 20 mg tablet Take 20 mg by mouth nightly. Plan:  Obtain authorization for testing from insurance company. Report to follow once testing, scoring, and interpretation completed. ? Organic based neurocognitive issues versus mood disorder or combination of same. ? Problems organic, functional, or both? This note will not be viewable in [address] ? Normal aging, ? Early dementia, ? Stress - he is very busy and is juggling a lot.

## 2017-04-14 ENCOUNTER — OFFICE VISIT (OUTPATIENT)
Dept: NEUROLOGY | Age: 67
End: 2017-04-14

## 2017-04-14 DIAGNOSIS — G31.84 MILD COGNITIVE IMPAIRMENT: Primary | ICD-10-CM

## 2017-04-14 DIAGNOSIS — F43.23 ADJUSTMENT DISORDER WITH MIXED ANXIETY AND DEPRESSED MOOD: ICD-10-CM

## 2017-04-18 NOTE — PROGRESS NOTES
[address] [facility],5Th Floor  [address] [facility] [address] [facility], [address]   [phone] Office   [phone] Fax      Neuropsychological Evaluation Report      Referral:  Ave Vidal MD, Dr. Jose C Ma is a 61 y.o. right handed  male who was accompanied by his spouse to the initial clinical interview on 4/5/17 . Please refer to his medical records for details pertaining to his history. Briefly, the patient reported that he completed college without history of previously diagnosed LD and/or receipt of special education services. 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 He has noticed a progressive memory decline starting about a few years ago and getting worse. He does not notice any major difficulties. Per the spouse, in the last year, there has been a progressive decline in short term memory. They will discuss something and he forgets that conversation. Misplaces things. Starts tasks and does not complete. She has to tell him the same thing more than once. His plate is really full, does a lot of different things. Will play piano not as well. No problems with driving. Spouse says he has trouble figuring out directions. He is good with names. He used to be excellent with driving directions. Independent for driving, medications, finances, day-to-day chores, etc. Uses GPS now, and didn't have to do things like that before. Balance is okay. He has a history of head injury about 30 years ago when playing basketball and got elbowed in the back of his head. Had to have surgery for that. No major residua right after that incident. No other major known neurologic history. No problems learning to use new devices. He does not have any problems with names. He works and does not have any problems with work. He is in a managerial position. He does not have any problems using his phone or a remote control.   He snores at night gets five hours tops, and stops breathing occasionally. He had a sleep study done which was negative for apnea. He does also have hypothyroidism. He did have blood work and thyroid function test was normal.  MRI was normal.  Is trying to balance a lot of different things. Plays piano for two churches, and directs choir for [employer], and has a very stressful job as well. No counseling or psychiatrist.  Both of his parents are living in their late 80s and he has one brother. Has a daughter that is causing a lot of stress and puts her problems on them. Financial stressors in that regard, and needs personal assistance and arguing with her children, and this is stresful. Daughter is in her 45s and grandchildren are 12 and 24. He does have a family history of dementia - uncles and aunts. Appetite is okay. No previous neuropsych testing. MMSE:  26/30  Recall 1/3  Spelling 4/5  Not oriented to city     Clock:  Normal  .  TOPF: 34     Historian: Good  Praxis: No UE apraxia  R/L Orientation: Intact to self and to other  Dress: within normal limits   Weight: within normal limits   Appearance/Hygiene: within normal limits   Gait: within normal limits   Assistive Devices: Glasses  Mood: within normal limits   Affect: within normal limits   Comprehension: within normal limits   Thought Process: within normal limits   Expressive Language: within normal limits   Receptive Language: within normal limits   Motor:  No cognitive or motor perseveration  ETOH:  Denied  Tobacco: Denied  Illicit: Denied  SI/HI: Denied  Psychosis: Denied  Insight: Within normal limits  Judgment: Within normal limits  Other Psych:      Past Medical History:   Diagnosis Date    Hypercholesteremia     Thyroid disease     hypothyroid       History reviewed. No pertinent surgical history.     No Known Allergies    Family History   Problem Relation Age of Onset   [de-identified] Cancer Mother     No Known Problems Father        Social History   Substance Use Topics    Smoking status: Never Smoker    Smokeless tobacco: None    Alcohol use None       Current Outpatient Prescriptions   Medication Sig Dispense Refill    levothyroxine (SYNTHROID) 88 mcg tablet Take 88 mcg by mouth Daily (before breakfast).  atorvastatin (LIPITOR) 20 mg tablet Take 20 mg by mouth nightly. Plan:  Obtain authorization for testing from insurance company. Report to follow once testing, scoring, and interpretation completed. ? Organic based neurocognitive issues versus mood disorder or combination of same. ? Problems organic, functional, or both? This note will not be viewable in [address] ? Normal aging, ? Early dementia, ? Stress - he is very busy and is juggling a lot. Neuropsychological Evaluation  Patient Testing 4/14/17 Report Completed 4/18/17  A Psychometrist Assisted w/ portions of this evaluation while under my direct supervision    Please refer to the patient's initial interview progress note (copied above) and her medical records for details pertaining to her history. Today's neuropsychological test scores follow: The following assessment procedures were performed:      Neuropsychologist Performed, Interpreted, & Reported: Neuropsychological Mental Status Exam, Revised Memory & Behavior Checklist, Mini Mental State Exam, Clock Drawing Test, Test Of Premorbid Functioning, Mackenzie-Melzack Pain Questionnaire,  History Taking  & Clinical Interview With The Patient, Additional History Taking w/ The Patient's Spouse, Review Of Available Records.     Psychometrist Administered & Neuropsychologist Interpreted & Neuropsychologist Reported: Finger Tapping Test, Grooved Pegboard Test, Speech-Sounds Perception Test, [employer], Wechsler Adult Intelligence Scale - IV, Verbal Fluency Tests, Kwasi & Kwasi - Revised, Trailmaking Test Parts A & B, [place] Verbal Learning Test - II, Carl Complex Figure Test, Hollis Depression Inventory - II, Hollis Anxiety Inventory, Personality Assessment Inventory. Computer Administered & Neuropsychologist Interpreted & Neuropsychologist Reported: Apoorva Continuous Performance Test - III      Test Findings:  Note:  The patients raw data have been compared with currently available norms which include demographic corrections for age, gender, and/or education. Sometimes, the patients scores are compared to demographically similar individuals as close to the patients age, education level, etc., as possible. \"Average\" is viewed as being +/- 1 standard deviation (SD) from the stated mean for a particular test score. \"Low average\" is viewed as being between 1 and 2 SD below the mean, and above average is viewed as being 1 and 2 SD above the mean. Scores falling in the borderline range (between 1-1/2 and 2 SD below the mean) are viewed with particular attention as to whether they are normal or abnormal neurocognitive test scores. Other methods of inference in analyzing the test data are also utilized, including the pattern and range of scores in the profile, bilateral motor functions, and the presence, if any, of pathognomonic signs. Behaviorally, the patient was friendly and cooperative and appeared motivated to perform well during this examination. Within this context, the results of this evaluation are viewed as a valid reflection of the patients actual neurocognitive and emotional status. His structured word list fluency, as assessed by the FAS Test, was within the above average range with a T score of 55. Category fluency was within the average range with a T score of 54. Confrontation naming ability, as assessed by the [facility] - Revised, was within the average range at 51/60 correct (T = 46).    This pattern of performance is not indicative of a patient who is at increased risk for day-to-day problems with verbal fluency and confrontation naming ability was also normal.       The patient was administered the Apoorva Continuous Performance Test - III, a computer-administered test of sustained attention, and review of the subscales within this instrument revealed mild concern for inattentiveness without impulsivity. Verbal auditory attention and discrimination, as assessed by the Speech-Sounds Perception Test (T = 47) was within the average range. Nonverbal auditory attention and discrimination, as assessed by the MERLYN, was mildly impaired. This pattern of performance is  indicative of a patient who is at increased risk for day-to-day problems with sustained visual attention/concentration and nonverbal auditory attention. Verbal auditory attention and discrimination related abilities were normal.       The patient was administered the Wechsler Adult Intelligence Scale - IV. See Appendix I for full breakdown of IQ test scores (scanned into media section of this EMR). As can be seen, there was no clinically significant difference between his low average range Working Memory Index score of 86 (18th %ile) and her average range Processing Speed Index score of 94 (34th %ile). His Verbal Comprehension Index score of 100 (50th %ile) was within the average range. His Perceptual Reasoning Index score of 79 (9th %ile) was within the borderline range. This pattern of performance is not indicative of a patient who is at increased risk for day-to-day problems with working memory and/or processing speed. Perceptual reasoning is lower than expected based on her performance on a task estimating premorbid functioning levels. The patient was administered the [address] Test  - II and generated a normal range and positive learning curve over five repeated auditory word list learning trials. An interference trial was within normal limits. Free and cued, short and long delayed recall were within the mildly impaired range. Recognition recall was impaired.  Forced choice recall was normal, suggesting that he put forth good effort on this test.   This pattern of performance is indicative of a patient who is at increased risk for day-to-day problems with auditory memory. Auditory learning is normal.       The patients performance on the copy portion of the Carl Complex Figure Test was impaired (<1st %ile). Recall for the complex design was within the impaired range after both short (<1st %ile) and long (<1st %ile) delays. Recognition recall was impaired (<1st %ile). This pattern of performance is indicative of a patient who is at increased risk for day-to-day problems with visual organization and visual delayed memory. Simple timed visual motor sequencing (Trailmaking Test Part A) was within the average range with a T score of 51. His performance on a similar, but more complex task of timed visual motor sequencing (Trailmaking Test Part B) was within the average range with a T score of 51. He made zero sequencing errors on this latter test.  Taken together, this pattern of performance is not indicative of a patient who is at increased risk for day-to-day problems with executive functioning. Motor speed for finger tapping was within the normal range bilaterally. Fine motor dexterity, as assessed by the [facility], was within the normal range bilaterally. This pattern of performance is not indicative of a patient who is at increased risk for day-to-day problems with bilateral motor speed and dexterity. The patient rated his current level of pain as \"0/5 - No Pain\" on the Mackenzie-Melzack Pain Questionnaire. His Hollis Depression Inventory -II score of 5 was within the minimally  depressed range. His Hollis Anxiety Inventory score of 0 reflected minimal anxiety. The patient was also administered the Personality Assessment Inventory and generated a valid profile for interpretation.   Within this context, mild adjustment related stress/depression issues are present, though the personality profile is otherwise within the normal range. Impressions & Recommendations: This patient generated a mixed normal/abnormal range Neuropsychological Evaluation with respect to neurocognitive functioning. However, In this regard, he is showing marked problems with visual memory and mild problems with auditory memory. Mild problems with visual attention and nonverbal auditory attention are also noted. Otherwise, his performance across all other neurocognitive domains assessed, including verbal fluency, confrontation naming, verbal auditory attention, auditory learning, working memory, processing speed, verbal comprehension, executive functioning, and bilateral fine motor dexterity remain within the normal range. Emotionally, there is support for mild adjustment related depression and anxiety. Organic based mood issues would not explain the type of memory loss he is showing. Mild attention problems would not be the basis for same, either. He put forth good effort on memory testing and passed an embedded measure of test taking effort on the CVLT-II. Furthermore, he has marked stressors at home and maintains a very busy lifestyle but that would not explain this type of profile, either. My opinion, then, is that it is likely that he is transitioning in to a dementia type process. While young for same, this profile would be most consistent with mild cognitive impairment with memory loss. My recommendations would include consideration for medication for memory and consider also a medication for attention if this is not medically contraindicated. Counseling to assist with stress/depression issues is also advised, and I do not see him as being in need of psychiatric medication management at this time. I would like to follow him closely with updated testing in six months to trach changes and to see if this is, in fact, dementia.   I find him competent, but he will need reminders for medications and finances. I am not concerned about competency. Reversible causes for memory loss need to be fully ruled out, such as possible sleep apnea or other medical issues. Baseline now established. Follow up in six months. Clinical correlation is strongly advised. I will discuss these findings with the patient when she follows up with me in the near future. A follow up Neuropsychological Evaluation is indicated on a prn basis, especially if there are any cognitive and/or emotional changes. 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 If there is any additional information of which I am currently unaware, I would be more than happy to review it upon having it made available to me. Thank you for the opportunity to see this interesting individual.     Sincerely,       Agustin Valdovinos. Shannen Duncan, EdS    CC: Lucretia Solares MD , Dr. Bo Number    2 units -[id number]  Record review. Review of history provided by patient. Review of collaborative information. Testing by Clinician. Review of raw data. Scoring. Report writing of individual tests administered by Clinician. Integration of individual tests administered by psychometrist (that were previously reported and billed under psychometry code below) with testing by clinician and review of records/history/collaborative information. Case Conceptualization, Report writing. Coordination Of Care. 5 units  -[id number] - Psychometrist test prep, administration, and scoring under clinician's direct supervision.   Clinical interpretation of individual tests administered by psychometrist .  Clinician report of individual tests administered by psychometrist.    1 unit - 87999 - Computer testing and scoring and clinician's interpretation of computer-administered test(s)    \"Unit\" is defined by CPT/National Guidelines (31 - 60 minutes). Integral services including scoring of raw data, data interpretation, case conceptualization, report writing etcetera were initiated after the patient finished testing/raw data collected and was completed on the date the report was signed.

## 2017-06-23 ENCOUNTER — OFFICE VISIT (OUTPATIENT)
Dept: NEUROLOGY | Age: 67
End: 2017-06-23

## 2017-06-23 VITALS
HEIGHT: 69 IN | OXYGEN SATURATION: 98 % | SYSTOLIC BLOOD PRESSURE: 118 MMHG | HEART RATE: 83 BPM | WEIGHT: 174.8 LBS | DIASTOLIC BLOOD PRESSURE: 72 MMHG | BODY MASS INDEX: 25.89 KG/M2

## 2017-06-23 DIAGNOSIS — G31.84 MCI (MILD COGNITIVE IMPAIRMENT): Primary | ICD-10-CM

## 2017-06-23 RX ORDER — DONEPEZIL HYDROCHLORIDE 10 MG/1
10 TABLET, FILM COATED ORAL
Qty: 30 TAB | Refills: 2 | Status: SHIPPED | OUTPATIENT
Start: 2017-06-23 | End: 2017-11-20 | Stop reason: SDUPTHER

## 2017-06-23 RX ORDER — DONEPEZIL HYDROCHLORIDE 5 MG/1
5 TABLET, FILM COATED ORAL
Qty: 30 TAB | Refills: 0 | Status: SHIPPED | OUTPATIENT
Start: 2017-06-23 | End: 2017-07-23

## 2017-06-23 NOTE — LETTER
6/23/2017 11:32 AM 
 
Patient:    Oliverio Shanks YOB: 1950 Date of Visit:    6/23/2017 Dear Saud Cazares MD 
 
Thank you for referring Mr. Nadir Dee to me for evaluation/treatment. Below are the relevant portions of my assessment and plan of care. NEUROLOGY follow-up appointment 06/23/17 Chief Complaint Patient presents with  Follow-up  
  follow up from Stephanie Glaser Oliverio Shanks is a 61 y.o. male who presented to the neurology office for memory problems. To recap, his memory problems started a few years ago and it has been gradually progressive. It does not interfere with his day-to-day activity but at times he is in the kitchen and he has to think twice with things are kept in the kitchen. He is very good with directions but recently takes him more effort to figure the directions. He does not have any problems with names. He works and does not have any problems with work. He is in a managerial position. He does not have any problems using his phone or a remote control. He or his wife has not noticed any changes in his personality. He sleeps 5 hours and he does snore at night and the wife does state that he sometimes stops breathing at night. He did have a sleep study since the last visit and it was negative for sleep apnea. He does also have hypothyroidism. He did have blood work and thyroid function test was normal. 
 
MRI of the brain which was normal.   
 
He did have neuropsychological testing and was found to have mild cognitive impairment and attention deficit. His memory is about the same. Current Outpatient Prescriptions Medication Sig  
 donepezil (ARICEPT) 5 mg tablet Take 1 Tab by mouth nightly for 30 days.  donepezil (ARICEPT) 10 mg tablet Take 1 Tab by mouth nightly.  atorvastatin (LIPITOR) 20 mg tablet Take 20 mg by mouth nightly. No current facility-administered medications for this visit. REVIEW OF SYSTEMS:  
A ten system review of constitutional, cardiovascular, respiratory, musculoskeletal, endocrine, skin, SHEENT, genitourinary, psychiatric and neurologic systems was obtained and is unremarkable with the exception of the following: Memory problems, hypothyroidism EXAMINATION:  
Visit Vitals  /72  Pulse 83  Ht 5' 9\" (1.753 m)  Wt 174 lb 12.8 oz (79.3 kg)  SpO2 98%  BMI 25.81 kg/m2 General:  
General appearance: Pt is in no acute distress Distal pulses are preserved Neurological Examination:  
Mental Status: AAO x3. Speech is fluent. Follows commands, has normal fund of knowledge, attention, short term recall, comprehension and insight. Cranial Nerves: Visual fields are full. PERRL, Extraocular movements are full. Facial sensation intact V1- V3. Facial movement intact, symmetric. Hearing intact to conversation. Palate elevates symmetrically. Shoulder shrug symmetric. Tongue midline. Motor: Strength is 5/5 in all 4 ext. No atrophy. Tone: Normal 
 
Sensation: Normal to light touch Coordination/Cerebellar: Intact to finger-nose-finger Gait: Romberg is negative and casual gait is normal.  
 
Skin: No significant bruising or lacerations. Mini Mental State Exam 6/23/2017 What is the Year 1 What is the Season 1 What is the Date 1 What is the Day 1 What is the Month 1 Where are we State 1 Where are we Country 1 Where are we [place] or [facility] 1 Whree are we Floor 1 Name three objects, then ask the patient to say them 3 Serial sevens Subtract 7 from 100 in increments 5 Ask for the three objects repeated above 1 Name a pencil 1 Name a watch 1 Have the patient repeat this phrase \"No ifs, ands, or buts\" 1 Three stage command: Take the paper in your right hand 1 Fold the paper in half 1 Put the paper on the floor 1 Read and obey the following: CLOSE YOUR EYES 1 Have the patient write a sentence 1  
 Have the patient copy a figure 1 Mini Mental Score 28 Laboratory review:  
Results for orders placed or performed in visit on 11/29/16 VITAMIN B12 Result Value Ref Range Vitamin B12 711 211 - 946 pg/mL TSH AND FREE T4 Result Value Ref Range TSH 2.690 0.450 - 4.500 uIU/mL T4, Free 1.14 0.82 - 1.77 ng/dL METHYLMALONIC ACID Result Value Ref Range METHYLMALONIC ACID, SERUM 106 0 - 378 nmol/L  
HEMOGLOBIN A1C W/O EAG Result Value Ref Range Hemoglobin A1c 5.9 (H) 4.8 - 5.6 % METABOLIC PANEL, COMPREHENSIVE Result Value Ref Range Glucose 92 65 - 99 mg/dL BUN 10 8 - 27 mg/dL Creatinine 1.10 0.76 - 1.27 mg/dL GFR est non-AA 71 >59 mL/min/1.73 GFR est AA 82 >59 mL/min/1.73  
 BUN/Creatinine ratio 9 (L) 10 - 22 Sodium 139 136 - 144 mmol/L Potassium 4.1 3.5 - 5.2 mmol/L Chloride 99 97 - 106 mmol/L  
 CO2 27 18 - 29 mmol/L Calcium 9.3 8.6 - 10.2 mg/dL Protein, total 7.4 6.0 - 8.5 g/dL Albumin 4.6 3.6 - 4.8 g/dL GLOBULIN, TOTAL 2.8 1.5 - 4.5 g/dL A-G Ratio 1.6 1.1 - 2.5 Bilirubin, total 0.6 0.0 - 1.2 mg/dL Alk. phosphatase 47 39 - 117 IU/L  
 AST (SGOT) 26 0 - 40 IU/L  
 ALT (SGPT) 19 0 - 44 IU/L  
CBC WITH AUTOMATED DIFF Result Value Ref Range WBC 3.9 3.4 - 10.8 x10E3/uL  
 RBC 4.22 4.14 - 5.80 x10E6/uL HGB 13.8 12.6 - 17.7 g/dL HCT 40.3 37.5 - 51.0 % MCV 96 79 - 97 fL  
 MCH 32.7 26.6 - 33.0 pg  
 MCHC 34.2 31.5 - 35.7 g/dL  
 RDW 13.9 12.3 - 15.4 % PLATELET 746 580 - 914 x10E3/uL NEUTROPHILS 35 % Lymphocytes 51 % MONOCYTES 10 % EOSINOPHILS 3 % BASOPHILS 1 %  
 ABS. NEUTROPHILS 1.3 (L) 1.4 - 7.0 x10E3/uL Abs Lymphocytes 2.0 0.7 - 3.1 x10E3/uL  
 ABS. MONOCYTES 0.4 0.1 - 0.9 x10E3/uL  
 ABS. EOSINOPHILS 0.1 0.0 - 0.4 x10E3/uL  
 ABS. BASOPHILS 0.0 0.0 - 0.2 x10E3/uL IMMATURE GRANULOCYTES 0 %  
 ABS. IMM. GRANS. 0.0 0.0 - 0.1 x10E3/uL Imaging review: 
12/4/2016 MRI of the brain without contrast 
Normal 
 I personally reviewed the images in PACS and this is my impression. 2/24/2017 Sleep study No evidence of significant sleep apnea. Snoring disorder. Documentation review: I did review the notes from Dr. Estephania Dias from 4/14/2017. The patient does have mixed normal/abnormal range neuropsychological testing. There is support for mild adjustment related depression and anxiety. Organic based mood issues would not explain the type of memory loss he is showing. He has marked stressors at home and maintains a very busy lifestyle but that would not explain this type of profile either. In my opinion, then, is that it is likely that he is transitioning into a dementia type process. This profile would be most consistent with mild cognitive impairment with memory loss. My recommendation would include consideration for medication for memory and consider also medication for attention if this is not medically contraindicated. Reversible causes of memory loss need to be fully ruled out such as possible sleep apnea and other medical issues. Follow-up in 6 months. Assessment/Plan:  
Lavell Muñoz is a 61 y.o. male who presented to the neurology office for management of mild cognitive impairment. He did have neuropsychological testing since the last visit. He was found to have mild cognitive impairment with risk for dementia. He does not have any sleep apnea. Am going to start him on Aricept 5 mg every day for a month and then 10 mg every day. Follow-up in 3 months Over 25 minutes was spent with the patient and family of which > 50% of the visit was spent counseling on diagnosis, management, and treatment of the diagnosis. I did discuss about mild cognitive impairment and risk for dementia. ICD-10-CM ICD-9-CM 1. MCI (mild cognitive impairment) G31.84 331.83 donepezil (ARICEPT) 5 mg tablet  
   donepezil (ARICEPT) 10 mg tablet Juventino Don MD 
 Neurologist and Clinical Neurophysiologist 
 
CC: Moy Oliver MD 
Fax: [phone] This note will not be viewable in [address] If you have questions, please do not hesitate to call me. I look forward to following Mr. Johns along with you. Sincerely, Danica Elliott MD

## 2017-06-23 NOTE — PROGRESS NOTES
NEUROLOGY follow-up appointment    06/23/17    Chief Complaint   Patient presents with    Follow-up     follow up from [facility] Vicente CHRISTIANSONJevon Parnell is a 61 y.o. male who presented to the neurology office for memory problems. To recap, his memory problems started a few years ago and it has been gradually progressive. It does not interfere with his day-to-day activity but at times he is in the kitchen and he has to think twice with things are kept in the kitchen. He is very good with directions but recently takes him more effort to figure the directions. He does not have any problems with names. He works and does not have any problems with work. He is in a managerial position. He does not have any problems using his phone or a remote control. He or his wife has not noticed any changes in his personality. He sleeps 5 hours and he does snore at night and the wife does state that he sometimes stops breathing at night. He did have a sleep study since the last visit and it was negative for sleep apnea. He does also have hypothyroidism. He did have blood work and thyroid function test was normal.    MRI of the brain which was normal.      He did have neuropsychological testing and was found to have mild cognitive impairment and attention deficit. His memory is about the same. Current Outpatient Prescriptions   Medication Sig    donepezil (ARICEPT) 5 mg tablet Take 1 Tab by mouth nightly for 30 days.  donepezil (ARICEPT) 10 mg tablet Take 1 Tab by mouth nightly.  atorvastatin (LIPITOR) 20 mg tablet Take 20 mg by mouth nightly. No current facility-administered medications for this visit.       REVIEW OF SYSTEMS:   A ten system review of constitutional, cardiovascular, respiratory, musculoskeletal, endocrine, skin, SHEENT, genitourinary, psychiatric and neurologic systems was obtained and is unremarkable with the exception of the following: Memory problems, hypothyroidism EXAMINATION:   Visit Vitals    /72    Pulse 83    Ht 5' 9\" (1.753 m)    Wt 174 lb 12.8 oz (79.3 kg)    SpO2 98%    BMI 25.81 kg/m2        General:   General appearance: Pt is in no acute distress   Distal pulses are preserved    Neurological Examination:   Mental Status: AAO x3. Speech is fluent. Follows commands, has normal fund of knowledge, attention, short term recall, comprehension and insight. Cranial Nerves: Visual fields are full. PERRL, Extraocular movements are full. Facial sensation intact V1- V3. Facial movement intact, symmetric. Hearing intact to conversation. Palate elevates symmetrically. Shoulder shrug symmetric. Tongue midline. Motor: Strength is 5/5 in all 4 ext. No atrophy. Tone: Normal    Sensation: Normal to light touch    Coordination/Cerebellar: Intact to finger-nose-finger     Gait: Romberg is negative and casual gait is normal.     Skin: No significant bruising or lacerations.     Mini Mental State Exam 2017   What is the Year 1   What is the Season 1   What is the Date 1   What is the Day 1   What is the Month 1   Where are we State 1   Where are we Country 1   Where are we [address] [place] [address] or [facility] 1   [place]raper are we Floor 1   Name three objects, then ask the patient to say them 3   Serial sevens Subtract 7 from 100 in increments 5   Ask for the three objects repeated above 1   Name a pencil 1   Name a watch 1   Have the patient repeat this phrase \"No ifs, ands, or buts\" 1   Three stage command: Take the paper in your right hand 1   Fold the paper in half 1   Put the paper on the floor 1   Read and obey the followin [facility] 1   Have the patient write a sentence 1   Have the patient copy a figure 1   Mini Mental Score 28     Laboratory review:   Results for orders placed or performed in visit on 16   VITAMIN B12   Result Value Ref Range    Vitamin B12 711 211 - 946 pg/mL   TSH AND FREE T4   Result Value Ref Range    TSH 2.690 0.450 - 4.500 uIU/mL    T4, Free 1. 14 0.82 - 1.77 ng/dL   METHYLMALONIC ACID   Result Value Ref Range    METHYLMALONIC ACID, SERUM 106 0 - 378 nmol/L   HEMOGLOBIN A1C W/O EAG   Result Value Ref Range    Hemoglobin A1c 5.9 (H) 4.8 - 5.6 %   METABOLIC PANEL, COMPREHENSIVE   Result Value Ref Range    Glucose 92 65 - 99 mg/dL    BUN 10 8 - 27 mg/dL    Creatinine 1.10 0.76 - 1.27 mg/dL    GFR est non-AA 71 >59 mL/min/1.73    GFR est AA 82 >59 mL/min/1.73    BUN/Creatinine ratio 9 (L) 10 - 22    Sodium 139 136 - 144 mmol/L    Potassium 4.1 3.5 - 5.2 mmol/L    Chloride 99 97 - 106 mmol/L    CO2 27 18 - 29 mmol/L    Calcium 9.3 8.6 - 10.2 mg/dL    Protein, total 7.4 6.0 - 8.5 g/dL    Albumin 4.6 3.6 - 4.8 g/dL    GLOBULIN, TOTAL 2.8 1.5 - 4.5 g/dL    A-G Ratio 1.6 1.1 - 2.5    Bilirubin, total 0.6 0.0 - 1.2 mg/dL    Alk. phosphatase 47 39 - 117 IU/L    AST (SGOT) 26 0 - 40 IU/L    ALT (SGPT) 19 0 - 44 IU/L   CBC WITH AUTOMATED DIFF   Result Value Ref Range    WBC 3.9 3.4 - 10.8 x10E3/uL    RBC 4.22 4.14 - 5.80 x10E6/uL    HGB 13.8 12.6 - 17.7 g/dL    HCT 40.3 37.5 - 51.0 %    MCV 96 79 - 97 fL    MCH 32.7 26.6 - 33.0 pg    MCHC 34.2 31.5 - 35.7 g/dL    RDW 13.9 12.3 - 15.4 %    PLATELET 211 245 - 785 x10E3/uL    NEUTROPHILS 35 %    Lymphocytes 51 %    MONOCYTES 10 %    EOSINOPHILS 3 %    BASOPHILS 1 %    ABS. NEUTROPHILS 1.3 (L) 1.4 - 7.0 x10E3/uL    Abs Lymphocytes 2.0 0.7 - 3.1 x10E3/uL    ABS. MONOCYTES 0.4 0.1 - 0.9 x10E3/uL    ABS. EOSINOPHILS 0.1 0.0 - 0.4 x10E3/uL    ABS. BASOPHILS 0.0 0.0 - 0.2 x10E3/uL    IMMATURE GRANULOCYTES 0 %    ABS. IMM. GRANS. 0.0 0.0 - 0.1 x10E3/uL       Imaging review:  12/4/2016  MRI of the brain without contrast  Normal  I personally reviewed the images in PACS and this is my impression. 2/24/2017  Sleep study  No evidence of significant sleep apnea. Snoring disorder. Documentation review:  I did review the notes from Dr. Brain Ahuja from 4/14/2017.   The patient does have mixed normal/abnormal range neuropsychological testing. There is support for mild adjustment related depression and anxiety. Organic based mood issues would not explain the type of memory loss he is showing. He has marked stressors at home and maintains a very busy lifestyle but that would not explain this type of profile either. In my opinion, then, is that it is likely that he is transitioning into a dementia type process. This profile would be most consistent with mild cognitive impairment with memory loss. My recommendation would include consideration for medication for memory and consider also medication for attention if this is not medically contraindicated. Reversible causes of memory loss need to be fully ruled out such as possible sleep apnea and other medical issues. Follow-up in 6 months. Assessment/Plan:   Lavell Muñoz is a 61 y.o. male who presented to the neurology office for management of mild cognitive impairment. He did have neuropsychological testing since the last visit. He was found to have mild cognitive impairment with risk for dementia. He does not have any sleep apnea. Am going to start him on Aricept 5 mg every day for a month and then 10 mg every day. Follow-up in 3 months    Over 25 minutes was spent with the patient and family of which > 50% of the visit was spent counseling on diagnosis, management, and treatment of the diagnosis. I did discuss about mild cognitive impairment and risk for dementia. ICD-10-CM ICD-9-CM    1. MCI (mild cognitive impairment) G31.84 331.83 donepezil (ARICEPT) 5 mg tablet      donepezil (ARICEPT) 10 mg tablet        Juventino Don MD  Neurologist and Clinical Neurophysiologist    CC: Kwesi Yanez MD  Fax: [phone]    This note will not be viewable in [address]

## 2017-06-23 NOTE — PATIENT INSTRUCTIONS
1. Aricept 5 mg daily for a month and then 10 mg daily  2. Follow-up in 3 months      A Healthy Lifestyle: Care Instructions  Your Care Instructions  A healthy lifestyle can help you feel good, stay at a healthy weight, and have plenty of energy for both work and play. A healthy lifestyle is something you can share with your whole family. A healthy lifestyle also can lower your risk for serious health problems, such as high blood pressure, heart disease, and diabetes. You can follow a few steps listed below to improve your health and the health of your family. Follow-up care is a key part of your treatment and safety. Be sure to make and go to all appointments, and call your doctor if you are having problems. Its also a good idea to know your test results and keep a list of the medicines you take. How can you care for yourself at home? · Do not eat too much sugar, fat, or fast foods. You can still have dessert and treats now and then. The goal is moderation. · Start small to improve your eating habits. Pay attention to portion sizes, drink less juice and soda pop, and eat more fruits and vegetables. ¨ Eat a healthy amount of food. A 3-ounce serving of meat, for example, is about the size of a deck of cards. Fill the rest of your plate with vegetables and whole grains. ¨ Limit the amount of soda and sports drinks you have every day. Drink more water when you are thirsty. ¨ Eat at least 5 servings of fruits and vegetables every day. It may seem like a lot, but it is not hard to reach this goal. A serving or helping is 1 piece of fruit, 1 cup of vegetables, or 2 cups of leafy, raw vegetables. Have an apple or some carrot sticks as an afternoon snack instead of a candy bar. Try to have fruits and/or vegetables at every meal.  · Make exercise part of your daily routine. You may want to start with simple activities, such as walking, bicycling, or slow swimming. Try to be active 30 to 60 minutes every day.  You do not need to do all 30 to 60 minutes all at once. For example, you can exercise 3 times a day for 10 or 20 minutes. Moderate exercise is safe for most people, but it is always a good idea to talk to your doctor before starting an exercise program.  · Keep moving. Anupama Villa the lawn, work in the garden, or [place]. Take the stairs instead of the elevator at work. · If you smoke, quit. People who smoke have an increased risk for heart attack, stroke, cancer, and other lung illnesses. Quitting is hard, but there are ways to boost your chance of quitting tobacco for good. ¨ Use nicotine gum, patches, or lozenges. ¨ Ask your doctor about stop-smoking programs and medicines. ¨ Keep trying. In addition to reducing your risk of diseases in the future, you will notice some benefits soon after you stop using tobacco. If you have shortness of breath or asthma symptoms, they will likely get better within a few weeks after you quit. · Limit how much alcohol you drink. Moderate amounts of alcohol (up to 2 drinks a day for men, 1 drink a day for women) are okay. But drinking too much can lead to liver problems, high blood pressure, and other health problems. Family health  If you have a family, there are many things you can do together to improve your health. · Eat meals together as a family as often as possible. · Eat healthy foods. This includes fruits, vegetables, lean meats and dairy, and whole grains. · Include your family in your fitness plan. Most people think of activities such as jogging or tennis as the way to fitness, but there are many ways you and your family can be more active. Anything that makes you breathe hard and gets your heart pumping is exercise. Here are some tips:  ¨ Walk to do errands or to take your child to school or the bus. ¨ Go for a family bike ride after dinner instead of watching TV. Where can you learn more? Go to http://leanna-carly.info/.   Enter B596 in the search box to learn more about \"A Healthy Lifestyle: Care Instructions. \"  Current as of: July 26, 2016  Content Version: 11.3  © 7376-9923 [employer]. Care instructions adapted under license by [employer] (which disclaims liability or warranty for this information). If you have questions about a medical condition or this instruction, always ask your healthcare professional. [address] any warranty or liability for your use of this information.

## 2017-06-23 NOTE — MR AVS SNAPSHOT
Visit Information Date & Time Provider Department Dept. Phone Encounter #  
 6/23/2017 10:40 AM Doreen Pina MD [facility] [phone] [id number] Upcoming Health Maintenance Date Due Hepatitis C Screening 1950 DTaP/Tdap/Td series (1 - Tdap) 7/23/1973 FOBT Q 1 YEAR AGE 50-75 7/23/2002 ZOSTER VACCINE AGE 60> 7/23/2012 INFLUENZA AGE 9 TO ADULT 8/1/2017 Allergies as of 6/23/2017  Review Complete On: 6/23/2017 By: Doreen Pina MD  
 No Known Allergies Current Immunizations  Never Reviewed No immunizations on file. Not reviewed this visit You Were Diagnosed With   
  
 Codes Comments MCI (mild cognitive impairment)    -  Primary ICD-10-CM: G31.84 ICD-9-CM: 331.83 Vitals BP Pulse Height(growth percentile) Weight(growth percentile) SpO2 BMI  
 118/72 83 5' 9\" (1.753 m) 174 lb 12.8 oz (79.3 kg) 98% 25.81 kg/m2 Smoking Status Never Smoker BMI and BSA Data Body Mass Index Body Surface Area  
 25.81 kg/m 2 1.96 m 2 Preferred Pharmacy Pharmacy Name Phone [facility] [address] [phone] Your Updated Medication List  
  
   
This list is accurate as of: 6/23/17 11:31 AM.  Always use your most recent med list.  
  
  
  
  
 * donepezil 5 mg tablet Commonly known as:  ARICEPT Take 1 Tab by mouth nightly for 30 days. * donepezil 10 mg tablet Commonly known as:  ARICEPT Take 1 Tab by mouth nightly. LIPITOR 20 mg tablet Generic drug:  atorvastatin Take 20 mg by mouth nightly. * Notice: This list has 2 medication(s) that are the same as other medications prescribed for you. Read the directions carefully, and ask your doctor or other care provider to review them with you. Prescriptions Sent to Pharmacy Refills donepezil (ARICEPT) 5 mg tablet 0 Sig: Take 1 Tab by mouth nightly for 30 days. Class: Normal  
 Pharmacy: [address] Ph #: [phone] Route: Oral  
 donepezil (ARICEPT) 10 mg tablet 2 Sig: Take 1 Tab by mouth nightly. Class: Normal  
 Pharmacy: [address] Ph #: [phone] Route: Oral  
  
Patient Instructions 1. Aricept 5 mg daily for a month and then 10 mg daily 2. Follow-up in 3 months A Healthy Lifestyle: Care Instructions Your Care Instructions A healthy lifestyle can help you feel good, stay at a healthy weight, and have plenty of energy for both work and play. A healthy lifestyle is something you can share with your whole family. A healthy lifestyle also can lower your risk for serious health problems, such as high blood pressure, heart disease, and diabetes. You can follow a few steps listed below to improve your health and the health of your family. Follow-up care is a key part of your treatment and safety. Be sure to make and go to all appointments, and call your doctor if you are having problems. Its also a good idea to know your test results and keep a list of the medicines you take. How can you care for yourself at home? · Do not eat too much sugar, fat, or fast foods. You can still have dessert and treats now and then. The goal is moderation. · Start small to improve your eating habits. Pay attention to portion sizes, drink less juice and soda pop, and eat more fruits and vegetables. ¨ Eat a healthy amount of food. A 3-ounce serving of meat, for example, is about the size of a deck of cards. Fill the rest of your plate with vegetables and whole grains. ¨ Limit the amount of soda and sports drinks you have every day. Drink more water when you are thirsty. ¨ Eat at least 5 servings of fruits and vegetables every day.  It may seem like a lot, but it is not hard to reach this goal. A serving or helping is 1 piece of fruit, 1 cup of vegetables, or 2 cups of leafy, raw vegetables. Have an apple or some carrot sticks as an afternoon snack instead of a candy bar. Try to have fruits and/or vegetables at every meal. 
· Make exercise part of your daily routine. You may want to start with simple activities, such as walking, bicycling, or slow swimming. Try to be active 30 to 60 minutes every day. You do not need to do all 30 to 60 minutes all at once. For example, you can exercise 3 times a day for 10 or 20 minutes. Moderate exercise is safe for most people, but it is always a good idea to talk to your doctor before starting an exercise program. 
· Keep moving. Mayme Poynette the lawn, work in the garden, or [employer]. Take the stairs instead of the elevator at work. · If you smoke, quit. People who smoke have an increased risk for heart attack, stroke, cancer, and other lung illnesses. Quitting is hard, but there are ways to boost your chance of quitting tobacco for good. ¨ Use nicotine gum, patches, or lozenges. ¨ Ask your doctor about stop-smoking programs and medicines. ¨ Keep trying. In addition to reducing your risk of diseases in the future, you will notice some benefits soon after you stop using tobacco. If you have shortness of breath or asthma symptoms, they will likely get better within a few weeks after you quit. · Limit how much alcohol you drink. Moderate amounts of alcohol (up to 2 drinks a day for men, 1 drink a day for women) are okay. But drinking too much can lead to liver problems, high blood pressure, and other health problems. Family health If you have a family, there are many things you can do together to improve your health. · Eat meals together as a family as often as possible. · Eat healthy foods. This includes fruits, vegetables, lean meats and dairy, and whole grains. · Include your family in your fitness plan. Most people think of activities such as jogging or tennis as the way to fitness, but there are many ways you and your family can be more active. Anything that makes you breathe hard and gets your heart pumping is exercise. Here are some tips: 
¨ Walk to do errands or to take your child to school or the bus. ¨ Go for a family bike ride after dinner instead of watching TV. Where can you learn more? Go to http://leanna-carly.info/. Enter H014 in the search box to learn more about \"A Healthy Lifestyle: Care Instructions. \" Current as of: July 26, 2016 Content Version: 11.3 © 9165-8520 [employer]. Care instructions adapted under license by [employer] (which disclaims liability or warranty for this information). If you have questions about a medical condition or this instruction, always ask your healthcare professional. [place] [address] any warranty or liability for your use of this information. Introducing [facility]! [employer] introduces [employer] patient portal. Now you can access parts of your medical record, email your doctor's office, and request medication refills online. 1. In your internet browser, go to [email or web address] 2. Click on the First Time User? Click Here link in the Sign In box. You will see the New Member Sign Up page. 3. Enter your [employer] Access Code exactly as it appears below. You will not need to use this code after youve completed the sign-up process. If you do not sign up before the expiration date, you must request a new code. · [employer] Access Code: [email or web address] Expires: 9/21/2017 10:41 AM 
 
4. Enter the last four digits of your Social Security Number (xxxx) and Date of Birth (mm/dd/yyyy) as indicated and click Submit. You will be taken to the next sign-up page. 5. Create a [employer] ID. This will be your [employer] login ID and cannot be changed, so think of one that is secure and easy to remember. 6. Create a [employer] password. You can change your password at any time. 7. Enter your Password Reset Question and Answer. This can be used at a later time if you forget your password. 8. Enter your e-mail address. You will receive e-mail notification when new information is available in [address] 9. Click Sign Up. You can now view and download portions of your medical record. 10. Click the Download Summary menu link to download a portable copy of your medical information. If you have questions, please visit the Frequently Asked Questions section of the [employer] website. Remember, [employer] is NOT to be used for urgent needs. For medical emergencies, dial 911. Now available from your iPhone and Android! Please provide this summary of care documentation to your next provider. Your primary care clinician is listed as MACI BRAMBILA. If you have any questions after today's visit, please call [phone].

## 2017-10-04 ENCOUNTER — OFFICE VISIT (OUTPATIENT)
Dept: NEUROLOGY | Age: 67
End: 2017-10-04

## 2017-10-04 VITALS
HEIGHT: 69 IN | HEART RATE: 71 BPM | OXYGEN SATURATION: 98 % | WEIGHT: 178 LBS | SYSTOLIC BLOOD PRESSURE: 110 MMHG | BODY MASS INDEX: 26.36 KG/M2 | DIASTOLIC BLOOD PRESSURE: 74 MMHG

## 2017-10-04 DIAGNOSIS — G31.84 MCI (MILD COGNITIVE IMPAIRMENT): Primary | ICD-10-CM

## 2017-10-04 DIAGNOSIS — F43.23 ADJUSTMENT DISORDER WITH MIXED ANXIETY AND DEPRESSED MOOD: ICD-10-CM

## 2017-10-04 DIAGNOSIS — R06.83 SNORING: ICD-10-CM

## 2017-10-04 NOTE — LETTER
10/4/2017 12:08 PM 
 
Patient:    Tashia Fink YOB: 1950 Date of Visit:    10/4/2017 Dear Gilford Eglin, MD 
 
Thank you for referring Mr. Gamaliel Luo to me for evaluation/treatment. Below are the relevant portions of my assessment and plan of care. NEUROLOGY follow-up appointment 10/04/17 Chief Complaint Patient presents with  Follow-up Memory Lost better Subjective Tashia Fink is a 74 y.o. male who presented to the neurology office for memory problems. To recap, his memory problems started a few years ago and it has been gradually progressive. It does not interfere with his day-to-day activity but at times he is in the kitchen and he has to think twice where things are kept in the kitchen. He is very good with directions but recently takes him more effort to figure out the directions. He does not have any problems with names. He works and does not have any problems with work. He is in a managerial position. He does not have any problems using his phone or a remote control. He or his wife has not noticed any changes in his personality. He sleeps 5 hours and he does snore at night and the wife does state that he sometimes stops breathing at night. He did have a sleep study since the last visit and it was negative for sleep apnea. His memory is improved on Aricept 10 mg daily. Current Outpatient Prescriptions Medication Sig  
 donepezil (ARICEPT) 10 mg tablet Take 1 Tab by mouth nightly.  atorvastatin (LIPITOR) 20 mg tablet Take 20 mg by mouth nightly. No current facility-administered medications for this visit. REVIEW OF SYSTEMS:  
A ten system review of constitutional, cardiovascular, respiratory, musculoskeletal, endocrine, skin, SHEENT, genitourinary, psychiatric and neurologic systems was obtained and is unremarkable with the exception of the following: Memory problems, hypothyroidism EXAMINATION:  
 Visit Vitals  /74  Pulse 71  
 Ht 5' 9\" (1.753 m)  Wt 178 lb (80.7 kg)  SpO2 98%  BMI 26.29 kg/m2 General:  
General appearance: Pt is in no acute distress Distal pulses are preserved Neurological Examination:  
Mental Status: AAO x3. Speech is fluent. Follows commands, has normal fund of knowledge, attention, short term recall, comprehension and insight. Cranial Nerves: Visual fields are full. PERRL, Extraocular movements are full. Facial sensation intact V1- V3. Facial movement intact, symmetric. Hearing intact to conversation. Palate elevates symmetrically. Shoulder shrug symmetric. Tongue midline. Motor: Strength is 5/5 in all 4 ext. No atrophy. Tone: Normal 
 
Sensation: Normal to light touch Coordination/Cerebellar: Intact to finger-nose-finger Gait: casual gait is normal.  
 
Skin: No significant bruising or lacerations. Mini Mental State Exam 6/23/2017 What is the Year 1 What is the Season 1 What is the Date 1 What is the Day 1 What is the Month 1 Where are we State 1 Where are we Country 1 Where are we [place] or [facility] 1 Whree are we Floor 1 Name three objects, then ask the patient to say them 3 Serial sevens Subtract 7 from 100 in increments 5 Ask for the three objects repeated above 1 Name a pencil 1 Name a watch 1 Have the patient repeat this phrase \"No ifs, ands, or buts\" 1 Three stage command: Take the paper in your right hand 1 Fold the paper in half 1 Put the paper on the floor 1 Read and obey the following: CLOSE YOUR EYES 1 Have the patient write a sentence 1 Have the patient copy a figure 1 Mini Mental Score 28 Laboratory review:  
Results for orders placed or performed in visit on 11/29/16 VITAMIN B12 Result Value Ref Range Vitamin B12 711 211 - 946 pg/mL TSH AND FREE T4 Result Value Ref Range TSH 2.690 0.450 - 4.500 uIU/mL T4, Free 1.14 0.82 - 1.77 ng/dL METHYLMALONIC ACID  
 Result Value Ref Range METHYLMALONIC ACID, SERUM 106 0 - 378 nmol/L  
HEMOGLOBIN A1C W/O EAG Result Value Ref Range Hemoglobin A1c 5.9 (H) 4.8 - 5.6 % METABOLIC PANEL, COMPREHENSIVE Result Value Ref Range Glucose 92 65 - 99 mg/dL BUN 10 8 - 27 mg/dL Creatinine 1.10 0.76 - 1.27 mg/dL GFR est non-AA 71 >59 mL/min/1.73 GFR est AA 82 >59 mL/min/1.73  
 BUN/Creatinine ratio 9 (L) 10 - 22 Sodium 139 136 - 144 mmol/L Potassium 4.1 3.5 - 5.2 mmol/L Chloride 99 97 - 106 mmol/L  
 CO2 27 18 - 29 mmol/L Calcium 9.3 8.6 - 10.2 mg/dL Protein, total 7.4 6.0 - 8.5 g/dL Albumin 4.6 3.6 - 4.8 g/dL GLOBULIN, TOTAL 2.8 1.5 - 4.5 g/dL A-G Ratio 1.6 1.1 - 2.5 Bilirubin, total 0.6 0.0 - 1.2 mg/dL Alk. phosphatase 47 39 - 117 IU/L  
 AST (SGOT) 26 0 - 40 IU/L  
 ALT (SGPT) 19 0 - 44 IU/L  
CBC WITH AUTOMATED DIFF Result Value Ref Range WBC 3.9 3.4 - 10.8 x10E3/uL  
 RBC 4.22 4.14 - 5.80 x10E6/uL HGB 13.8 12.6 - 17.7 g/dL HCT 40.3 37.5 - 51.0 % MCV 96 79 - 97 fL  
 MCH 32.7 26.6 - 33.0 pg  
 MCHC 34.2 31.5 - 35.7 g/dL  
 RDW 13.9 12.3 - 15.4 % PLATELET 789 325 - 537 x10E3/uL NEUTROPHILS 35 % Lymphocytes 51 % MONOCYTES 10 % EOSINOPHILS 3 % BASOPHILS 1 %  
 ABS. NEUTROPHILS 1.3 (L) 1.4 - 7.0 x10E3/uL Abs Lymphocytes 2.0 0.7 - 3.1 x10E3/uL  
 ABS. MONOCYTES 0.4 0.1 - 0.9 x10E3/uL  
 ABS. EOSINOPHILS 0.1 0.0 - 0.4 x10E3/uL  
 ABS. BASOPHILS 0.0 0.0 - 0.2 x10E3/uL IMMATURE GRANULOCYTES 0 %  
 ABS. IMM. GRANS. 0.0 0.0 - 0.1 x10E3/uL Imaging review: 
12/4/2016 MRI of the brain without contrast 
Normal 
I personally reviewed the images in PACS and this is my impression. 2/24/2017 Sleep study No evidence of significant sleep apnea. Snoring disorder. Documentation review: I did review the notes from Dr. Lilli Geiger from 4/14/2017. The patient does have mixed normal/abnormal range neuropsychological testing.   There is support for mild adjustment related depression and anxiety. Organic based mood issues would not explain the type of memory loss he is showing. He has marked stressors at home and maintains a very busy lifestyle but that would not explain this type of profile either. In my opinion, then, is that it is likely that he is transitioning into a dementia type process. This profile would be most consistent with mild cognitive impairment with memory loss. My recommendation would include consideration for medication for memory and consider also medication for attention if this is not medically contraindicated. Reversible causes of memory loss need to be fully ruled out such as possible sleep apnea and other medical issues. Follow-up in 6 months. Assessment/Plan:  
Pacheco Coronel is a 74 y.o. male who presented to the neurology office for management of mild cognitive impairment. His neuropsychological testing is positive for mild cognitive impairment. He is on Aricept 10 mg daily and has noticed improvement in his memory. Follow-up in 6 months Over 25 minutes was spent with the patient and family of which > 50% of the visit was spent counseling on diagnosis, management, and treatment of the diagnosis. I did discuss about mild cognitive impairment and risk for dementia. ICD-10-CM ICD-9-CM 1. MCI (mild cognitive impairment) G31.84 331.83   
2. Adjustment disorder with mixed anxiety and depressed mood F43.23 309.28   
3. Snoring R06.83 786.09 Cody Isbell MD 
Neurologist and Clinical Neurophysiologist 
 
CC: Mecca Nunes MD 
Fax: [phone] This note will not be viewable in [address] If you have questions, please do not hesitate to call me. I look forward to following Mr. Johns along with you. Sincerely, Cody Isbell MD

## 2017-10-04 NOTE — MR AVS SNAPSHOT
Visit Information Date & Time Provider Department Dept. Phone Encounter #  
 10/4/2017 11:40 AM Ronell Bumpers, MD [facility] [phone] [id number] Your Appointments 4/9/2018  8:40 AM  
Follow Up with Ronell Bumpers, MD  
[facility] at [facility] Alexandre Gillette) Appt Note: Follow up $0CP tdb 10/4/17  
 [address] [place] [address], 
[address] [place] [address], [address] [phone]  
[address] [facility], [address] [facility], [address] [address] Upcoming Health Maintenance Date Due Hepatitis C Screening 1950 DTaP/Tdap/Td series (1 - Tdap) 7/23/1973 FOBT Q 1 YEAR AGE 50-75 7/23/2002 ZOSTER VACCINE AGE 60> 5/23/2012 GLAUCOMA SCREENING Q2Y 7/23/2017 Pneumococcal 65+ Low/Medium Risk (1 of 2 - PCV13) 7/23/2017 MEDICARE YEARLY EXAM 7/23/2017 INFLUENZA AGE 9 TO ADULT 8/1/2017 Allergies as of 10/4/2017  Review Complete On: 10/4/2017 By: Ronell Bumpers, MD  
 No Known Allergies Current Immunizations  Never Reviewed No immunizations on file. Not reviewed this visit You Were Diagnosed With   
  
 Codes Comments MCI (mild cognitive impairment)    -  Primary ICD-10-CM: G31.84 ICD-9-CM: 331.83 Adjustment disorder with mixed anxiety and depressed mood     ICD-10-CM: F43.23 
ICD-9-CM: 309.28 Snoring     ICD-10-CM: R06.83 
ICD-9-CM: 786.09 Vitals BP Pulse Height(growth percentile) Weight(growth percentile) SpO2 BMI  
 110/74 71 5' 9\" (1.753 m) 178 lb (80.7 kg) 98% 26.29 kg/m2 Smoking Status Never Smoker BMI and BSA Data Body Mass Index Body Surface Area  
 26.29 kg/m 2 1.98 m 2 Preferred Pharmacy Pharmacy Name Phone [phone] [phone] Your Updated Medication List  
  
   
 This list is accurate as of: 10/4/17 12:08 PM.  Always use your most recent med list.  
  
  
  
  
 donepezil 10 mg tablet Commonly known as:  ARICEPT Take 1 Tab by mouth nightly. LIPITOR 20 mg tablet Generic drug:  atorvastatin Take 20 mg by mouth nightly. Patient Instructions A Healthy Lifestyle: Care Instructions Your Care Instructions A healthy lifestyle can help you feel good, stay at a healthy weight, and have plenty of energy for both work and play. A healthy lifestyle is something you can share with your whole family. A healthy lifestyle also can lower your risk for serious health problems, such as high blood pressure, heart disease, and diabetes. You can follow a few steps listed below to improve your health and the health of your family. Follow-up care is a key part of your treatment and safety. Be sure to make and go to all appointments, and call your doctor if you are having problems. Its also a good idea to know your test results and keep a list of the medicines you take. How can you care for yourself at home? · Do not eat too much sugar, fat, or fast foods. You can still have dessert and treats now and then. The goal is moderation. · Start small to improve your eating habits. Pay attention to portion sizes, drink less juice and soda pop, and eat more fruits and vegetables. ¨ Eat a healthy amount of food. A 3-ounce serving of meat, for example, is about the size of a deck of cards. Fill the rest of your plate with vegetables and whole grains. ¨ Limit the amount of soda and sports drinks you have every day. Drink more water when you are thirsty. ¨ Eat at least 5 servings of fruits and vegetables every day. It may seem like a lot, but it is not hard to reach this goal. A serving or helping is 1 piece of fruit, 1 cup of vegetables, or 2 cups of leafy, raw vegetables.  Have an apple or some carrot sticks as an afternoon snack instead of a candy bar. Try to have fruits and/or vegetables at every meal. 
· Make exercise part of your daily routine. You may want to start with simple activities, such as walking, bicycling, or slow swimming. Try to be active 30 to 60 minutes every day. You do not need to do all 30 to 60 minutes all at once. For example, you can exercise 3 times a day for 10 or 20 minutes. Moderate exercise is safe for most people, but it is always a good idea to talk to your doctor before starting an exercise program. 
· Keep moving. Roselia Trevino the lawn, work in the garden, or [employer]. Take the stairs instead of the elevator at work. · If you smoke, quit. People who smoke have an increased risk for heart attack, stroke, cancer, and other lung illnesses. Quitting is hard, but there are ways to boost your chance of quitting tobacco for good. ¨ Use nicotine gum, patches, or lozenges. ¨ Ask your doctor about stop-smoking programs and medicines. ¨ Keep trying. In addition to reducing your risk of diseases in the future, you will notice some benefits soon after you stop using tobacco. If you have shortness of breath or asthma symptoms, they will likely get better within a few weeks after you quit. · Limit how much alcohol you drink. Moderate amounts of alcohol (up to 2 drinks a day for men, 1 drink a day for women) are okay. But drinking too much can lead to liver problems, high blood pressure, and other health problems. Family health If you have a family, there are many things you can do together to improve your health. · Eat meals together as a family as often as possible. · Eat healthy foods. This includes fruits, vegetables, lean meats and dairy, and whole grains. · Include your family in your fitness plan. Most people think of activities such as jogging or tennis as the way to fitness, but there are many ways you and your family can be more active.  Anything that makes you breathe hard and gets your heart pumping is exercise. Here are some tips: 
¨ Walk to do errands or to take your child to school or the bus. ¨ Go for a family bike ride after dinner instead of watching TV. Where can you learn more? Go to http://leanna-carly.info/. Enter G169 in the search box to learn more about \"A Healthy Lifestyle: Care Instructions. \" Current as of: July 26, 2016 Content Version: 11.3 © 2110-2378 [employer]. Care instructions adapted under license by [employer] (which disclaims liability or warranty for this information). If you have questions about a medical condition or this instruction, always ask your healthcare professional. [address] any warranty or liability for your use of this information. Introducing [facility]! Dear Luis Escobar: Thank you for requesting a Phone.com account. Our records indicate that you already have an active [email or web address] account. You can access your account anytime at [email or web address] Did you know that you can access your hospital and ER discharge instructions at any time in [email or web address]? You can also review all of your test results from your hospital stay or ER visit. Additional Information If you have questions, please visit the Frequently Asked Questions section of the [email or web address] website at [email or web address] Remember, Phone.com is NOT to be used for urgent needs. For medical emergencies, dial 911. Now available from your iPhone and Android! Please provide this summary of care documentation to your next provider. Your primary care clinician is listed as MACI BRAMBILA. If you have any questions after today's visit, please call [phone].

## 2017-10-04 NOTE — PATIENT INSTRUCTIONS

## 2017-10-04 NOTE — PROGRESS NOTES
NEUROLOGY follow-up appointment    10/04/17    Chief Complaint   Patient presents with    Follow-up     Memory Lost better       Subjective  Saud Paul is a 74 y.o. male who presented to the neurology office for memory problems. To recap, his memory problems started a few years ago and it has been gradually progressive. It does not interfere with his day-to-day activity but at times he is in the kitchen and he has to think twice where things are kept in the kitchen. He is very good with directions but recently takes him more effort to figure out the directions. He does not have any problems with names. He works and does not have any problems with work. He is in a managerial position. He does not have any problems using his phone or a remote control. He or his wife has not noticed any changes in his personality. He sleeps 5 hours and he does snore at night and the wife does state that he sometimes stops breathing at night. He did have a sleep study since the last visit and it was negative for sleep apnea. His memory is improved on Aricept 10 mg daily. Current Outpatient Prescriptions   Medication Sig    donepezil (ARICEPT) 10 mg tablet Take 1 Tab by mouth nightly.  atorvastatin (LIPITOR) 20 mg tablet Take 20 mg by mouth nightly. No current facility-administered medications for this visit. REVIEW OF SYSTEMS:   A ten system review of constitutional, cardiovascular, respiratory, musculoskeletal, endocrine, skin, SHEENT, genitourinary, psychiatric and neurologic systems was obtained and is unremarkable with the exception of the following: Memory problems, hypothyroidism     EXAMINATION:   Visit Vitals    /74    Pulse 71    Ht 5' 9\" (1.753 m)    Wt 178 lb (80.7 kg)    SpO2 98%    BMI 26.29 kg/m2        General:   General appearance: Pt is in no acute distress   Distal pulses are preserved    Neurological Examination:   Mental Status: AAO x3. Speech is fluent.  Follows commands, has normal fund of knowledge, attention, short term recall, comprehension and insight. Cranial Nerves: Visual fields are full. PERRL, Extraocular movements are full. Facial sensation intact V1- V3. Facial movement intact, symmetric. Hearing intact to conversation. Palate elevates symmetrically. Shoulder shrug symmetric. Tongue midline. Motor: Strength is 5/5 in all 4 ext. No atrophy. Tone: Normal    Sensation: Normal to light touch    Coordination/Cerebellar: Intact to finger-nose-finger     Gait: casual gait is normal.     Skin: No significant bruising or lacerations.     Mini Mental State Exam 6/23/2017   What is the Year 1   What is the Season 1   What is the Date 1   What is the Day 1   What is the Month 1   Where are we State 1   Where are we Country 1   Where are we [address] [place] [address] or [facility] 1   [place] are we Floor 1   Name three objects, then ask the patient to say them 3   Serial sevens Subtract 7 from 100 in increments 5   Ask for the three objects repeated above 1   Name a pencil 1   Name a watch 1   Have the patient repeat this phrase \"No ifs, ands, or buts\" 1   Three stage command: Take the paper in your right hand 1   Fold the paper in half 1   Put the paper on the floor 1   Read and obey the following: CLOSE YOUR EYES 1   Have the patient write a sentence 1   Have the patient copy a figure 1   Mini Mental Score 28     Laboratory review:   Results for orders placed or performed in visit on 11/29/16   VITAMIN B12   Result Value Ref Range    Vitamin B12 711 211 - 946 pg/mL   TSH AND FREE T4   Result Value Ref Range    TSH 2.690 0.450 - 4.500 uIU/mL    T4, Free 1.14 0.82 - 1.77 ng/dL   METHYLMALONIC ACID   Result Value Ref Range    METHYLMALONIC ACID, SERUM 106 0 - 378 nmol/L   HEMOGLOBIN A1C W/O EAG   Result Value Ref Range    Hemoglobin A1c 5.9 (H) 4.8 - 5.6 %   METABOLIC PANEL, COMPREHENSIVE   Result Value Ref Range    Glucose 92 65 - 99 mg/dL    BUN 10 8 - 27 mg/dL    Creatinine 1.10 0.76 - 1.27 mg/dL    GFR est non-AA 71 >59 mL/min/1.73    GFR est AA 82 >59 mL/min/1.73    BUN/Creatinine ratio 9 (L) 10 - 22    Sodium 139 136 - 144 mmol/L    Potassium 4.1 3.5 - 5.2 mmol/L    Chloride 99 97 - 106 mmol/L    CO2 27 18 - 29 mmol/L    Calcium 9.3 8.6 - 10.2 mg/dL    Protein, total 7.4 6.0 - 8.5 g/dL    Albumin 4.6 3.6 - 4.8 g/dL    GLOBULIN, TOTAL 2.8 1.5 - 4.5 g/dL    A-G Ratio 1.6 1.1 - 2.5    Bilirubin, total 0.6 0.0 - 1.2 mg/dL    Alk. phosphatase 47 39 - 117 IU/L    AST (SGOT) 26 0 - 40 IU/L    ALT (SGPT) 19 0 - 44 IU/L   CBC WITH AUTOMATED DIFF   Result Value Ref Range    WBC 3.9 3.4 - 10.8 x10E3/uL    RBC 4.22 4.14 - 5.80 x10E6/uL    HGB 13.8 12.6 - 17.7 g/dL    HCT 40.3 37.5 - 51.0 %    MCV 96 79 - 97 fL    MCH 32.7 26.6 - 33.0 pg    MCHC 34.2 31.5 - 35.7 g/dL    RDW 13.9 12.3 - 15.4 %    PLATELET 814 939 - 186 x10E3/uL    NEUTROPHILS 35 %    Lymphocytes 51 %    MONOCYTES 10 %    EOSINOPHILS 3 %    BASOPHILS 1 %    ABS. NEUTROPHILS 1.3 (L) 1.4 - 7.0 x10E3/uL    Abs Lymphocytes 2.0 0.7 - 3.1 x10E3/uL    ABS. MONOCYTES 0.4 0.1 - 0.9 x10E3/uL    ABS. EOSINOPHILS 0.1 0.0 - 0.4 x10E3/uL    ABS. BASOPHILS 0.0 0.0 - 0.2 x10E3/uL    IMMATURE GRANULOCYTES 0 %    ABS. IMM. GRANS. 0.0 0.0 - 0.1 x10E3/uL       Imaging review:  12/4/2016  MRI of the brain without contrast  Normal  I personally reviewed the images in PACS and this is my impression. 2/24/2017  Sleep study  No evidence of significant sleep apnea. Snoring disorder. Documentation review:  I did review the notes from Dr. Jaz Wood from 4/14/2017. The patient does have mixed normal/abnormal range neuropsychological testing. There is support for mild adjustment related depression and anxiety. Organic based mood issues would not explain the type of memory loss he is showing. He has marked stressors at home and maintains a very busy lifestyle but that would not explain this type of profile either.   In my opinion, then, is that it is likely that he is transitioning into a dementia type process. This profile would be most consistent with mild cognitive impairment with memory loss. My recommendation would include consideration for medication for memory and consider also medication for attention if this is not medically contraindicated. Reversible causes of memory loss need to be fully ruled out such as possible sleep apnea and other medical issues. Follow-up in 6 months. Assessment/Plan:   Torsten Andrews is a 74 y.o. male who presented to the neurology office for management of mild cognitive impairment. His neuropsychological testing is positive for mild cognitive impairment. He is on Aricept 10 mg daily and has noticed improvement in his memory. Follow-up in 6 months    Over 25 minutes was spent with the patient and family of which > 50% of the visit was spent counseling on diagnosis, management, and treatment of the diagnosis. I did discuss about mild cognitive impairment and risk for dementia. ICD-10-CM ICD-9-CM    1. MCI (mild cognitive impairment) G31.84 331.83    2. Adjustment disorder with mixed anxiety and depressed mood F43.23 309.28    3. Snoring R06.83 786.09         Marc Cook MD  Neurologist and Clinical Neurophysiologist    CC: Rc oJhn MD  Fax: [phone]    This note will not be viewable in [address]

## 2017-11-20 DIAGNOSIS — G31.84 MCI (MILD COGNITIVE IMPAIRMENT): ICD-10-CM

## 2017-11-20 RX ORDER — DONEPEZIL HYDROCHLORIDE 10 MG/1
TABLET, FILM COATED ORAL
Qty: 30 TAB | Refills: 2 | Status: SHIPPED | OUTPATIENT
Start: 2017-11-20 | End: 2018-03-30 | Stop reason: SDUPTHER

## 2018-03-30 DIAGNOSIS — G31.84 MCI (MILD COGNITIVE IMPAIRMENT): ICD-10-CM

## 2018-03-30 RX ORDER — DONEPEZIL HYDROCHLORIDE 10 MG/1
TABLET, FILM COATED ORAL
Qty: 30 TAB | Refills: 2 | Status: SHIPPED | OUTPATIENT
Start: 2018-03-30 | End: 2018-07-10 | Stop reason: SDUPTHER

## 2018-04-09 ENCOUNTER — OFFICE VISIT (OUTPATIENT)
Dept: NEUROLOGY | Age: 68
End: 2018-04-09

## 2018-04-09 VITALS
DIASTOLIC BLOOD PRESSURE: 70 MMHG | HEIGHT: 69 IN | HEART RATE: 69 BPM | SYSTOLIC BLOOD PRESSURE: 118 MMHG | OXYGEN SATURATION: 98 % | BODY MASS INDEX: 25.36 KG/M2 | WEIGHT: 171.2 LBS

## 2018-04-09 DIAGNOSIS — F43.23 ADJUSTMENT DISORDER WITH MIXED ANXIETY AND DEPRESSED MOOD: ICD-10-CM

## 2018-04-09 DIAGNOSIS — R06.83 SNORING: ICD-10-CM

## 2018-04-09 DIAGNOSIS — R73.03 PREDIABETES: ICD-10-CM

## 2018-04-09 DIAGNOSIS — G31.84 MCI (MILD COGNITIVE IMPAIRMENT): Primary | ICD-10-CM

## 2018-04-09 NOTE — MR AVS SNAPSHOT
[address] [place] [address], 
[id number], [address] [facility] 
[phone] Patient: Florencio Beverly MRN: [id number] UZD:[id number]/0850 Visit Information Date & Time Provider Department Dept. Phone Encounter #  
 4/9/2018  8:40 AM Allyson Mark MD [facility] [phone] [id number] Your Appointments 10/8/2018 10:00 AM  
Follow Up with Allyson Mark MD  
[facility]) Appt Note: [address], [address] [id number]  
[address], [address] [place] [address] [address] [address] Upcoming Health Maintenance Date Due Hepatitis C Screening 1950 DTaP/Tdap/Td series (1 - Tdap) 7/23/1973 FOBT Q 1 YEAR AGE 50-75 7/23/2002 ZOSTER VACCINE AGE 60> 5/23/2012 GLAUCOMA SCREENING Q2Y 7/23/2017 Pneumococcal 65+ Low/Medium Risk (1 of 2 - PCV13) 7/23/2017 Influenza Age 5 to Adult 8/1/2017 MEDICARE YEARLY EXAM 3/14/2018 Allergies as of 4/9/2018  Review Complete On: 4/9/2018 By: Elizabeth Jacobo No Known Allergies Current Immunizations  Never Reviewed No immunizations on file. Not reviewed this visit Vitals BP Pulse Height(growth percentile) Weight(growth percentile) SpO2 BMI  
 118/70 69 5' 9\" (1.753 m) 171 lb 3.2 oz (77.7 kg) 98% 25.28 kg/m2 Smoking Status Never Smoker BMI and BSA Data Body Mass Index Body Surface Area  
 25.28 kg/m 2 1.94 m 2 Preferred Pharmacy Pharmacy Name Phone [phone] [phone] Your Updated Medication List  
  
   
This list is accurate as of 4/9/18  9:15 AM.  Always use your most recent med list.  
  
  
  
  
 donepezil 10 mg tablet Commonly known as:  ARICEPT  
 take 1 tablet by mouth NIGHTLY  
  
 LIPITOR 20 mg tablet Generic drug:  atorvastatin Take 20 mg by mouth nightly. Patient Instructions A Healthy Lifestyle: Care Instructions Your Care Instructions A healthy lifestyle can help you feel good, stay at a healthy weight, and have plenty of energy for both work and play. A healthy lifestyle is something you can share with your whole family. A healthy lifestyle also can lower your risk for serious health problems, such as high blood pressure, heart disease, and diabetes. You can follow a few steps listed below to improve your health and the health of your family. Follow-up care is a key part of your treatment and safety. Be sure to make and go to all appointments, and call your doctor if you are having problems. It's also a good idea to know your test results and keep a list of the medicines you take. How can you care for yourself at home? · Do not eat too much sugar, fat, or fast foods. You can still have dessert and treats now and then. The goal is moderation. · Start small to improve your eating habits. Pay attention to portion sizes, drink less juice and soda pop, and eat more fruits and vegetables. ¨ Eat a healthy amount of food. A 3-ounce serving of meat, for example, is about the size of a deck of cards. Fill the rest of your plate with vegetables and whole grains. ¨ Limit the amount of soda and sports drinks you have every day. Drink more water when you are thirsty. ¨ Eat at least 5 servings of fruits and vegetables every day. It may seem like a lot, but it is not hard to reach this goal. A serving or helping is 1 piece of fruit, 1 cup of vegetables, or 2 cups of leafy, raw vegetables. Have an apple or some carrot sticks as an afternoon snack instead of a candy bar. Try to have fruits and/or vegetables at every meal. 
· Make exercise part of your daily routine.  You may want to start with simple activities, such as walking, bicycling, or slow swimming. Try to be active 30 to 60 minutes every day. You do not need to do all 30 to 60 minutes all at once. For example, you can exercise 3 times a day for 10 or 20 minutes. Moderate exercise is safe for most people, but it is always a good idea to talk to your doctor before starting an exercise program. 
· Keep moving. Chaim Cue the lawn, work in the garden, or [employer]. Take the stairs instead of the elevator at work. · If you smoke, quit. People who smoke have an increased risk for heart attack, stroke, cancer, and other lung illnesses. Quitting is hard, but there are ways to boost your chance of quitting tobacco for good. ¨ Use nicotine gum, patches, or lozenges. ¨ Ask your doctor about stop-smoking programs and medicines. ¨ Keep trying. In addition to reducing your risk of diseases in the future, you will notice some benefits soon after you stop using tobacco. If you have shortness of breath or asthma symptoms, they will likely get better within a few weeks after you quit. · Limit how much alcohol you drink. Moderate amounts of alcohol (up to 2 drinks a day for men, 1 drink a day for women) are okay. But drinking too much can lead to liver problems, high blood pressure, and other health problems. Family health If you have a family, there are many things you can do together to improve your health. · Eat meals together as a family as often as possible. · Eat healthy foods. This includes fruits, vegetables, lean meats and dairy, and whole grains. · Include your family in your fitness plan. Most people think of activities such as jogging or tennis as the way to fitness, but there are many ways you and your family can be more active. Anything that makes you breathe hard and gets your heart pumping is exercise. Here are some tips: 
¨ Walk to do errands or to take your child to school or the bus. ¨ Go for a family bike ride after dinner instead of watching TV. Where can you learn more? Go to http://leanna-carly.info/. Enter T577 in the search box to learn more about \"A Healthy Lifestyle: Care Instructions. \" Current as of: May 12, 2017 Content Version: 11.4 © 5904-7278 [employer]. Care instructions adapted under license by [employer] (which disclaims liability or warranty for this information). If you have questions about a medical condition or this instruction, always ask your healthcare professional. [address] any warranty or liability for your use of this information. Please be advised there is a $25 fee for all paperwork to be completed from our  providers. This is to be paid by the patient prior to picking up the completed forms. Introducing \[facility] [place] [facility]\"" & HEALTH SERVICES! Dear Valentín Rowell: Thank you for requesting a [employer] account. Our records indicate that you already have an active [employer] account. You can access your account anytime at [email or web address] Did you know that you can access your hospital and ER discharge instructions at any time in [employer]? You can also review all of your test results from your hospital stay or ER visit. Additional Information If you have questions, please visit the Frequently Asked Questions section of the [employer] website at [email or web address] Remember, [employer] is NOT to be used for urgent needs. For medical emergencies, dial 911. Now available from your iPhone and Android! Please provide this summary of care documentation to your next provider. Your primary care clinician is listed as MACI BRAMBILA. If you have any questions after today's visit, please call [phone].

## 2018-04-09 NOTE — PATIENT INSTRUCTIONS
A Healthy Lifestyle: Care Instructions  Your Care Instructions    A healthy lifestyle can help you feel good, stay at a healthy weight, and have plenty of energy for both work and play. A healthy lifestyle is something you can share with your whole family. A healthy lifestyle also can lower your risk for serious health problems, such as high blood pressure, heart disease, and diabetes. You can follow a few steps listed below to improve your health and the health of your family. Follow-up care is a key part of your treatment and safety. Be sure to make and go to all appointments, and call your doctor if you are having problems. It's also a good idea to know your test results and keep a list of the medicines you take. How can you care for yourself at home? · Do not eat too much sugar, fat, or fast foods. You can still have dessert and treats now and then. The goal is moderation. · Start small to improve your eating habits. Pay attention to portion sizes, drink less juice and soda pop, and eat more fruits and vegetables. ¨ Eat a healthy amount of food. A 3-ounce serving of meat, for example, is about the size of a deck of cards. Fill the rest of your plate with vegetables and whole grains. ¨ Limit the amount of soda and sports drinks you have every day. Drink more water when you are thirsty. ¨ Eat at least 5 servings of fruits and vegetables every day. It may seem like a lot, but it is not hard to reach this goal. A serving or helping is 1 piece of fruit, 1 cup of vegetables, or 2 cups of leafy, raw vegetables. Have an apple or some carrot sticks as an afternoon snack instead of a candy bar. Try to have fruits and/or vegetables at every meal.  · Make exercise part of your daily routine. You may want to start with simple activities, such as walking, bicycling, or slow swimming. Try to be active 30 to 60 minutes every day. You do not need to do all 30 to 60 minutes all at once.  For example, you can exercise 3 times a day for 10 or 20 minutes. Moderate exercise is safe for most people, but it is always a good idea to talk to your doctor before starting an exercise program.  · Keep moving. Kaitlynbonita Alcaraz the lawn, work in the garden, or [employer]. Take the stairs instead of the elevator at work. · If you smoke, quit. People who smoke have an increased risk for heart attack, stroke, cancer, and other lung illnesses. Quitting is hard, but there are ways to boost your chance of quitting tobacco for good. ¨ Use nicotine gum, patches, or lozenges. ¨ Ask your doctor about stop-smoking programs and medicines. ¨ Keep trying. In addition to reducing your risk of diseases in the future, you will notice some benefits soon after you stop using tobacco. If you have shortness of breath or asthma symptoms, they will likely get better within a few weeks after you quit. · Limit how much alcohol you drink. Moderate amounts of alcohol (up to 2 drinks a day for men, 1 drink a day for women) are okay. But drinking too much can lead to liver problems, high blood pressure, and other health problems. Family health  If you have a family, there are many things you can do together to improve your health. · Eat meals together as a family as often as possible. · Eat healthy foods. This includes fruits, vegetables, lean meats and dairy, and whole grains. · Include your family in your fitness plan. Most people think of activities such as jogging or tennis as the way to fitness, but there are many ways you and your family can be more active. Anything that makes you breathe hard and gets your heart pumping is exercise. Here are some tips:  ¨ Walk to do errands or to take your child to school or the bus. ¨ Go for a family bike ride after dinner instead of watching TV. Where can you learn more? Go to http://leanna-carly.info/. Enter V672 in the search box to learn more about \"A Healthy Lifestyle: Care Instructions. \"  Current as of: May 12, 2017  Content Version: 11.4  © 0483-7460 [employer], [employer]. Care instructions adapted under license by [employer] (which disclaims liability or warranty for this information). If you have questions about a medical condition or this instruction, always ask your healthcare professional. [address] any warranty or liability for your use of this information. Please be advised there is a $25 fee for all paperwork to be completed from our  providers. This is to be paid by the patient prior to picking up the completed forms.

## 2018-04-09 NOTE — LETTER
4/9/2018 9:51 AM 
 
Patient:    Justin Irwin YOB: 1950 Date of Visit:    4/9/2018 Dear Finesse Peralta MD 
 
Thank you for referring Mr. Jay Keller to me for evaluation/treatment. Below are the relevant portions of my assessment and plan of care. NEUROLOGY follow-up appointment 04/09/18 Chief Complaint Patient presents with  Follow-up Memory same Subjective Justin Irwin is a 74 y.o. male who presented to the neurology office for memory problems. To recap, his memory problems started a few years ago and it has been gradually progressive. It does not interfere with his day-to-day activity but at times he is in the kitchen and he has to think twice where things are kept in the kitchen. He is very good with directions but recently takes him more effort to figure out the directions. He does not have any problems with names. He recently retired from work. He does not have any problems using his phone or a remote control. He or his wife has not noticed any changes in his personality. He is on Aricept 10 mg daily and has noticed improvement in memory. He sleeps 5 hours and he does snore at night and the wife does state that he sometimes stops breathing at night. He did have a sleep study since the last visit and it was negative for sleep apnea. Current Outpatient Prescriptions Medication Sig  
 donepezil (ARICEPT) 10 mg tablet take 1 tablet by mouth NIGHTLY  atorvastatin (LIPITOR) 20 mg tablet Take 20 mg by mouth nightly. No current facility-administered medications for this visit. REVIEW OF SYSTEMS:  
A ten system review of constitutional, cardiovascular, respiratory, musculoskeletal, endocrine, skin, SHEENT, genitourinary, psychiatric and neurologic systems was obtained and is unremarkable with the exception of the following: Memory problems, hypothyroidism EXAMINATION:  
Visit Vitals  /70  Pulse 69  
  Ht 5' 9\" (1.753 m)  Wt 171 lb 3.2 oz (77.7 kg)  SpO2 98%  BMI 25.28 kg/m2 General:  
General appearance: Pt is in no acute distress Distal pulses are preserved Neurological Examination:  
Mental Status: AAO x3. Speech is fluent. Follows commands, has normal fund of knowledge, attention, short term recall, comprehension and insight. Cranial Nerves: Visual fields are full. PERRL, Extraocular movements are full. Facial sensation intact V1- V3. Facial movement intact, symmetric. Hearing intact to conversation. Palate elevates symmetrically. Shoulder shrug symmetric. Tongue midline. Motor: Strength is 5/5 in all 4 ext. No atrophy. Tone: Normal 
 
Sensation: Normal to light touch Coordination/Cerebellar: Intact to finger-nose-finger Gait: casual gait is normal.  
 
Skin: No significant bruising or lacerations. Mini Mental State Exam 6/23/2017 What is the Year 1 What is the Season 1 What is the Date 1 What is the Day 1 What is the Month 1 Where are we State 1 Where are we Country 1 Where are we [place] [facility] 1 Whree are we Floor 1 Name three objects, then ask the patient to say them 3 Serial sevens Subtract 7 from 100 in increments 5 Ask for the three objects repeated above 1 Name a pencil 1 Name a watch 1 Have the patient repeat this phrase \"No ifs, ands, or buts\" 1 Three stage command: Take the paper in your right hand 1 Fold the paper in half 1 Put the paper on the floor 1 Read and obey the following: CLOSE YOUR EYES 1 Have the patient write a sentence 1 Have the patient copy a figure 1 Mini Mental Score 28 Laboratory review:  
Results for orders placed or performed in visit on 11/29/16 VITAMIN B12 Result Value Ref Range Vitamin B12 711 211 - 946 pg/mL TSH AND FREE T4 Result Value Ref Range TSH 2.690 0.450 - 4.500 uIU/mL T4, Free 1.14 0.82 - 1.77 ng/dL METHYLMALONIC ACID Result Value Ref Range METHYLMALONIC ACID, SERUM 106 0 - 378 nmol/L  
HEMOGLOBIN A1C W/O EAG Result Value Ref Range Hemoglobin A1c 5.9 (H) 4.8 - 5.6 % METABOLIC PANEL, COMPREHENSIVE Result Value Ref Range Glucose 92 65 - 99 mg/dL BUN 10 8 - 27 mg/dL Creatinine 1.10 0.76 - 1.27 mg/dL GFR est non-AA 71 >59 mL/min/1.73 GFR est AA 82 >59 mL/min/1.73  
 BUN/Creatinine ratio 9 (L) 10 - 22 Sodium 139 136 - 144 mmol/L Potassium 4.1 3.5 - 5.2 mmol/L Chloride 99 97 - 106 mmol/L  
 CO2 27 18 - 29 mmol/L Calcium 9.3 8.6 - 10.2 mg/dL Protein, total 7.4 6.0 - 8.5 g/dL Albumin 4.6 3.6 - 4.8 g/dL GLOBULIN, TOTAL 2.8 1.5 - 4.5 g/dL A-G Ratio 1.6 1.1 - 2.5 Bilirubin, total 0.6 0.0 - 1.2 mg/dL Alk. phosphatase 47 39 - 117 IU/L  
 AST (SGOT) 26 0 - 40 IU/L  
 ALT (SGPT) 19 0 - 44 IU/L  
CBC WITH AUTOMATED DIFF Result Value Ref Range WBC 3.9 3.4 - 10.8 x10E3/uL  
 RBC 4.22 4.14 - 5.80 x10E6/uL HGB 13.8 12.6 - 17.7 g/dL HCT 40.3 37.5 - 51.0 % MCV 96 79 - 97 fL  
 MCH 32.7 26.6 - 33.0 pg  
 MCHC 34.2 31.5 - 35.7 g/dL  
 RDW 13.9 12.3 - 15.4 % PLATELET 975 277 - 062 x10E3/uL NEUTROPHILS 35 % Lymphocytes 51 % MONOCYTES 10 % EOSINOPHILS 3 % BASOPHILS 1 %  
 ABS. NEUTROPHILS 1.3 (L) 1.4 - 7.0 x10E3/uL Abs Lymphocytes 2.0 0.7 - 3.1 x10E3/uL  
 ABS. MONOCYTES 0.4 0.1 - 0.9 x10E3/uL  
 ABS. EOSINOPHILS 0.1 0.0 - 0.4 x10E3/uL  
 ABS. BASOPHILS 0.0 0.0 - 0.2 x10E3/uL IMMATURE GRANULOCYTES 0 %  
 ABS. IMM. GRANS. 0.0 0.0 - 0.1 x10E3/uL Imaging review: 
12/4/2016 MRI of the brain without contrast 
Normal 
I personally reviewed the images in PACS and this is my impression. 2/24/2017 Sleep study No evidence of significant sleep apnea. Snoring disorder. Documentation review: I did review the notes from Dr. Zahraa Hernandez from 4/14/2017. The patient does have mixed normal/abnormal range neuropsychological testing.   There is support for mild adjustment related depression and anxiety. Organic based mood issues would not explain the type of memory loss he is showing. He has marked stressors at home and maintains a very busy lifestyle but that would not explain this type of profile either. In my opinion, then, is that it is likely that he is transitioning into a dementia type process. This profile would be most consistent with mild cognitive impairment with memory loss. My recommendation would include consideration for medication for memory and consider also medication for attention if this is not medically contraindicated. Reversible causes of memory loss need to be fully ruled out such as possible sleep apnea and other medical issues. Follow-up in 6 months. Assessment/Plan:  
Camilla Hester is a 74 y.o. male who presented to the neurology office for management of mild cognitive impairment. His neuropsychological testing is positive for mild cognitive impairment. He is on Aricept 10 mg daily and has noticed improvement in his memory. Will continue the same Follow-up in 6 months Over 25 minutes was spent with the patient and family of which > 50% of the visit was spent counseling on diagnosis, management, and treatment of the diagnosis. I did discuss about mild cognitive impairment and risk for dementia. ICD-10-CM ICD-9-CM 1. MCI (mild cognitive impairment) G31.84 331.83   
2. Adjustment disorder with mixed anxiety and depressed mood F43.23 309.28   
3. Snoring R06.83 786.09   
4. Prediabetes R73.03 790.29 Bob Sepulveda MD 
Neurologist and Clinical Neurophysiologist 
 
CC: Iliana Hoover MD 
Fax: [phone] This note will not be viewable in [address] If you have questions, please do not hesitate to call me. I look forward to following Mr. Johns along with you. Sincerely, Bob Sepulveda MD

## 2018-04-09 NOTE — PROGRESS NOTES
NEUROLOGY follow-up appointment    04/09/18    Chief Complaint   Patient presents with    Follow-up     Memory same       Subjective  Claudette Jungling is a 74 y.o. male who presented to the neurology office for memory problems. To recap, his memory problems started a few years ago and it has been gradually progressive. It does not interfere with his day-to-day activity but at times he is in the kitchen and he has to think twice where things are kept in the kitchen. He is very good with directions but recently takes him more effort to figure out the directions. He does not have any problems with names. He recently retired from work. He does not have any problems using his phone or a remote control. He or his wife has not noticed any changes in his personality. He is on Aricept 10 mg daily and has noticed improvement in memory. He sleeps 5 hours and he does snore at night and the wife does state that he sometimes stops breathing at night. He did have a sleep study since the last visit and it was negative for sleep apnea. Current Outpatient Prescriptions   Medication Sig    donepezil (ARICEPT) 10 mg tablet take 1 tablet by mouth NIGHTLY    atorvastatin (LIPITOR) 20 mg tablet Take 20 mg by mouth nightly. No current facility-administered medications for this visit. REVIEW OF SYSTEMS:   A ten system review of constitutional, cardiovascular, respiratory, musculoskeletal, endocrine, skin, SHEENT, genitourinary, psychiatric and neurologic systems was obtained and is unremarkable with the exception of the following: Memory problems, hypothyroidism     EXAMINATION:   Visit Vitals    /70    Pulse 69    Ht 5' 9\" (1.753 m)    Wt 171 lb 3.2 oz (77.7 kg)    SpO2 98%    BMI 25.28 kg/m2        General:   General appearance: Pt is in no acute distress   Distal pulses are preserved    Neurological Examination:   Mental Status: AAO x3. Speech is fluent.  Follows commands, has normal fund of knowledge, attention, short term recall, comprehension and insight. Cranial Nerves: Visual fields are full. PERRL, Extraocular movements are full. Facial sensation intact V1- V3. Facial movement intact, symmetric. Hearing intact to conversation. Palate elevates symmetrically. Shoulder shrug symmetric. Tongue midline. Motor: Strength is 5/5 in all 4 ext. No atrophy. Tone: Normal    Sensation: Normal to light touch    Coordination/Cerebellar: Intact to finger-nose-finger     Gait: casual gait is normal.     Skin: No significant bruising or lacerations.     Mini Mental State Exam 2017   What is the Year 1   What is the Season 1   What is the Date 1   What is the Day 1   What is the Month 1   Where are we State 1   Where are we Country 1   Where are we [address] [place] [address] or [facility] 1   [facility] are we Floor 1   Name three objects, then ask the patient to say them 3   Serial sevens Subtract 7 from 100 in increments 5   Ask for the three objects repeated above 1   Name a pencil 1   Name a watch 1   Have the patient repeat this phrase \"No ifs, ands, or buts\" 1   Three stage command: Take the paper in your right hand 1   Fold the paper in half 1   Put the paper on the floor 1   Read and obey the followin [employer] [place] 1   Have the patient write a sentence 1   Have the patient copy a figure 1   Mini Mental Score 28     Laboratory review:   Results for orders placed or performed in visit on 16   VITAMIN B12   Result Value Ref Range    Vitamin B12 711 211 - 946 pg/mL   TSH AND FREE T4   Result Value Ref Range    TSH 2.690 0.450 - 4.500 uIU/mL    T4, Free 1.14 0.82 - 1.77 ng/dL   METHYLMALONIC ACID   Result Value Ref Range    METHYLMALONIC ACID, SERUM 106 0 - 378 nmol/L   HEMOGLOBIN A1C W/O EAG   Result Value Ref Range    Hemoglobin A1c 5.9 (H) 4.8 - 5.6 %   METABOLIC PANEL, COMPREHENSIVE   Result Value Ref Range    Glucose 92 65 - 99 mg/dL    BUN 10 8 - 27 mg/dL    Creatinine 1.10 0.76 - 1.27 mg/dL    GFR est non-AA 71 >59 mL/min/1.73    GFR est AA 82 >59 mL/min/1.73    BUN/Creatinine ratio 9 (L) 10 - 22    Sodium 139 136 - 144 mmol/L    Potassium 4.1 3.5 - 5.2 mmol/L    Chloride 99 97 - 106 mmol/L    CO2 27 18 - 29 mmol/L    Calcium 9.3 8.6 - 10.2 mg/dL    Protein, total 7.4 6.0 - 8.5 g/dL    Albumin 4.6 3.6 - 4.8 g/dL    GLOBULIN, TOTAL 2.8 1.5 - 4.5 g/dL    A-G Ratio 1.6 1.1 - 2.5    Bilirubin, total 0.6 0.0 - 1.2 mg/dL    Alk. phosphatase 47 39 - 117 IU/L    AST (SGOT) 26 0 - 40 IU/L    ALT (SGPT) 19 0 - 44 IU/L   CBC WITH AUTOMATED DIFF   Result Value Ref Range    WBC 3.9 3.4 - 10.8 x10E3/uL    RBC 4.22 4.14 - 5.80 x10E6/uL    HGB 13.8 12.6 - 17.7 g/dL    HCT 40.3 37.5 - 51.0 %    MCV 96 79 - 97 fL    MCH 32.7 26.6 - 33.0 pg    MCHC 34.2 31.5 - 35.7 g/dL    RDW 13.9 12.3 - 15.4 %    PLATELET 956 616 - 990 x10E3/uL    NEUTROPHILS 35 %    Lymphocytes 51 %    MONOCYTES 10 %    EOSINOPHILS 3 %    BASOPHILS 1 %    ABS. NEUTROPHILS 1.3 (L) 1.4 - 7.0 x10E3/uL    Abs Lymphocytes 2.0 0.7 - 3.1 x10E3/uL    ABS. MONOCYTES 0.4 0.1 - 0.9 x10E3/uL    ABS. EOSINOPHILS 0.1 0.0 - 0.4 x10E3/uL    ABS. BASOPHILS 0.0 0.0 - 0.2 x10E3/uL    IMMATURE GRANULOCYTES 0 %    ABS. IMM. GRANS. 0.0 0.0 - 0.1 x10E3/uL       Imaging review:  12/4/2016  MRI of the brain without contrast  Normal  I personally reviewed the images in PACS and this is my impression. 2/24/2017  Sleep study  No evidence of significant sleep apnea. Snoring disorder. Documentation review:  I did review the notes from Dr. Prince Corbett from 4/14/2017. The patient does have mixed normal/abnormal range neuropsychological testing. There is support for mild adjustment related depression and anxiety. Organic based mood issues would not explain the type of memory loss he is showing. He has marked stressors at home and maintains a very busy lifestyle but that would not explain this type of profile either.   In my opinion, then, is that it is likely that he is transitioning into a dementia type process. This profile would be most consistent with mild cognitive impairment with memory loss. My recommendation would include consideration for medication for memory and consider also medication for attention if this is not medically contraindicated. Reversible causes of memory loss need to be fully ruled out such as possible sleep apnea and other medical issues. Follow-up in 6 months. Assessment/Plan:   Naga Simms is a 74 y.o. male who presented to the neurology office for management of mild cognitive impairment. His neuropsychological testing is positive for mild cognitive impairment. He is on Aricept 10 mg daily and has noticed improvement in his memory. Will continue the same    Follow-up in 6 months    Over 25 minutes was spent with the patient and family of which > 50% of the visit was spent counseling on diagnosis, management, and treatment of the diagnosis. I did discuss about mild cognitive impairment and risk for dementia. ICD-10-CM ICD-9-CM    1. MCI (mild cognitive impairment) G31.84 331.83    2. Adjustment disorder with mixed anxiety and depressed mood F43.23 309.28    3. Snoring R06.83 786.09    4. Prediabetes R73.03 790.29         Brielle Gregory MD  Neurologist and Clinical Neurophysiologist    CC: Balta Mares MD  Fax: [phone]    This note will not be viewable in [address]

## 2018-06-16 ENCOUNTER — HOSPITAL ENCOUNTER (EMERGENCY)
Age: 68
Discharge: HOME | End: 2018-06-16
Attending: EMERGENCY MEDICINE
Payer: MEDICARE

## 2018-06-16 VITALS
HEART RATE: 61 BPM | TEMPERATURE: 98 F | DIASTOLIC BLOOD PRESSURE: 69 MMHG | SYSTOLIC BLOOD PRESSURE: 113 MMHG | WEIGHT: 170 LBS | RESPIRATION RATE: 20 BRPM | BODY MASS INDEX: 25.18 KG/M2 | HEIGHT: 69 IN | OXYGEN SATURATION: 100 %

## 2018-06-16 DIAGNOSIS — T67.5XXA HEAT EXHAUSTION, INITIAL ENCOUNTER: Primary | ICD-10-CM

## 2018-06-16 DIAGNOSIS — E86.0 DEHYDRATION: ICD-10-CM

## 2018-06-16 DIAGNOSIS — R55 SYNCOPE AND COLLAPSE: ICD-10-CM

## 2018-06-16 LAB
ALBUMIN SERPL-MCNC: 3.9 G/DL (ref 3.5–5)
ALBUMIN/GLOB SERPL: 1.2 {RATIO} (ref 1.1–2.2)
ALP SERPL-CCNC: 43 U/L (ref 45–117)
ALT SERPL-CCNC: 22 U/L (ref 12–78)
ANION GAP SERPL CALC-SCNC: 7 MMOL/L (ref 5–15)
AST SERPL-CCNC: 22 U/L (ref 15–37)
BASOPHILS # BLD: 0 K/UL (ref 0–0.1)
BASOPHILS NFR BLD: 0 % (ref 0–1)
BILIRUB SERPL-MCNC: 0.7 MG/DL (ref 0.2–1)
BUN SERPL-MCNC: 14 MG/DL (ref 6–20)
BUN/CREAT SERPL: 10 (ref 12–20)
CALCIUM SERPL-MCNC: 8.7 MG/DL (ref 8.5–10.1)
CHLORIDE SERPL-SCNC: 112 MMOL/L (ref 97–108)
CK MB CFR SERPL CALC: 0.4 % (ref 0–2.5)
CK MB SERPL-MCNC: 1.3 NG/ML (ref 5–25)
CK SERPL-CCNC: 346 U/L (ref 39–308)
CO2 SERPL-SCNC: 27 MMOL/L (ref 21–32)
CREAT SERPL-MCNC: 1.38 MG/DL (ref 0.7–1.3)
DIFFERENTIAL METHOD BLD: ABNORMAL
EOSINOPHIL # BLD: 0.1 K/UL (ref 0–0.4)
EOSINOPHIL NFR BLD: 1 % (ref 0–7)
ERYTHROCYTE [DISTWIDTH] IN BLOOD BY AUTOMATED COUNT: 14.1 % (ref 11.5–14.5)
GLOBULIN SER CALC-MCNC: 3.3 G/DL (ref 2–4)
GLUCOSE SERPL-MCNC: 95 MG/DL (ref 65–100)
HCT VFR BLD AUTO: 36.2 % (ref 36.6–50.3)
HGB BLD-MCNC: 12.4 G/DL (ref 12.1–17)
IMM GRANULOCYTES # BLD: 0.1 K/UL (ref 0–0.04)
IMM GRANULOCYTES NFR BLD AUTO: 0 % (ref 0–0.5)
LYMPHOCYTES # BLD: 0.9 K/UL (ref 0.8–3.5)
LYMPHOCYTES NFR BLD: 6 % (ref 12–49)
MCH RBC QN AUTO: 33.7 PG (ref 26–34)
MCHC RBC AUTO-ENTMCNC: 34.3 G/DL (ref 30–36.5)
MCV RBC AUTO: 98.4 FL (ref 80–99)
MONOCYTES # BLD: 0.9 K/UL (ref 0–1)
MONOCYTES NFR BLD: 7 % (ref 5–13)
NEUTS SEG # BLD: 11.9 K/UL (ref 1.8–8)
NEUTS SEG NFR BLD: 86 % (ref 32–75)
NRBC # BLD: 0 K/UL (ref 0–0.01)
NRBC BLD-RTO: 0 PER 100 WBC
PLATELET # BLD AUTO: 192 K/UL (ref 150–400)
PMV BLD AUTO: 10.2 FL (ref 8.9–12.9)
POTASSIUM SERPL-SCNC: 3.6 MMOL/L (ref 3.5–5.1)
PROT SERPL-MCNC: 7.2 G/DL (ref 6.4–8.2)
RBC # BLD AUTO: 3.68 M/UL (ref 4.1–5.7)
SODIUM SERPL-SCNC: 146 MMOL/L (ref 136–145)
TROPONIN I SERPL-MCNC: <0.05 NG/ML
WBC # BLD AUTO: 13.8 K/UL (ref 4.1–11.1)

## 2018-06-16 PROCEDURE — 85025 COMPLETE CBC W/AUTO DIFF WBC: CPT | Performed by: EMERGENCY MEDICINE

## 2018-06-16 PROCEDURE — 84484 ASSAY OF TROPONIN QUANT: CPT | Performed by: EMERGENCY MEDICINE

## 2018-06-16 PROCEDURE — 99285 EMERGENCY DEPT VISIT HI MDM: CPT

## 2018-06-16 PROCEDURE — 80053 COMPREHEN METABOLIC PANEL: CPT | Performed by: EMERGENCY MEDICINE

## 2018-06-16 PROCEDURE — 93005 ELECTROCARDIOGRAM TRACING: CPT

## 2018-06-16 PROCEDURE — 82553 CREATINE MB FRACTION: CPT | Performed by: EMERGENCY MEDICINE

## 2018-06-16 PROCEDURE — 82550 ASSAY OF CK (CPK): CPT | Performed by: EMERGENCY MEDICINE

## 2018-06-16 PROCEDURE — 36415 COLL VENOUS BLD VENIPUNCTURE: CPT | Performed by: EMERGENCY MEDICINE

## 2018-06-16 RX ORDER — LEVOTHYROXINE SODIUM 125 UG/1
75 TABLET ORAL
COMMUNITY

## 2018-06-16 NOTE — ED NOTES
Assumed care of patient. Patient arrives via EMS after syncopal episode while outside playing softball. Patient states he had been outside for a majority of the day at a picnic prior to the game. Patient reports he had been playing for about an hour and was sitting on a bench when he began to feel dizzy. Patient reports lost consciousness shortly after. Patient states reports appropriate water intake throughout the day. Patient denies head injury, nausea, or vomiting. Patient is alert and oriented x4, respirations even and unlabored, VSS. Monitor x3, call bell within reach.

## 2018-06-17 LAB
ATRIAL RATE: 65 BPM
CALCULATED P AXIS, ECG09: 44 DEGREES
CALCULATED R AXIS, ECG10: 62 DEGREES
CALCULATED T AXIS, ECG11: 39 DEGREES
DIAGNOSIS, 93000: NORMAL
P-R INTERVAL, ECG05: 178 MS
Q-T INTERVAL, ECG07: 390 MS
QRS DURATION, ECG06: 80 MS
QTC CALCULATION (BEZET), ECG08: 405 MS
VENTRICULAR RATE, ECG03: 65 BPM

## 2018-06-17 NOTE — ED NOTES
Discharge instructions reviewed with patient. Discharge instructions given to patient per Dr. Deirdre Walter. Patient able to return/verbalize discharge instructions. Copy of discharge instructions provided. Patient condition stable, respiratory status within normal limits, neuro status intact. Ambulatory out of ER, accompanied by family.

## 2018-06-17 NOTE — DISCHARGE INSTRUCTIONS
Dehydration: Care Instructions  Your Care Instructions  Dehydration happens when your body loses too much fluid. This might happen when you do not drink enough water or you lose large amounts of fluids from your body because of diarrhea, vomiting, or sweating. Severe dehydration can be life-threatening. Water and minerals called electrolytes help put your body fluids back in balance. Learn the early signs of fluid loss, and drink more fluids to prevent dehydration. Follow-up care is a key part of your treatment and safety. Be sure to make and go to all appointments, and call your doctor if you are having problems. It's also a good idea to know your test results and keep a list of the medicines you take. How can you care for yourself at home? · To prevent dehydration, drink plenty of fluids, enough so that your urine is light yellow or clear like water. Choose water and other caffeine-free clear liquids until you feel better. If you have kidney, heart, or liver disease and have to limit fluids, talk with your doctor before you increase the amount of fluids you drink. · If you do not feel like eating or drinking, try taking small sips of water, sports drinks, or other rehydration drinks. · Get plenty of rest.  To prevent dehydration  · Add more fluids to your diet and daily routine, unless your doctor has told you not to. · During hot weather, drink more fluids. Drink even more fluids if you exercise a lot. Stay away from drinks with alcohol or caffeine. · Watch for the symptoms of dehydration. These include:  ¨ A dry, sticky mouth. ¨ Dark yellow urine, and not much of it. ¨ Dry and sunken eyes. ¨ Feeling very tired. · Learn what problems can lead to dehydration. These include:  ¨ Diarrhea, fever, and vomiting. ¨ Any illness with a fever, such as pneumonia or the flu. ¨ Activities that cause heavy sweating, such as endurance races and heavy outdoor work in hot or humid weather.   ¨ Alcohol or drug abuse or withdrawal.  ¨ Certain medicines, such as cold and allergy pills (antihistamines), diet pills (diuretics), and laxatives. ¨ Certain diseases, such as diabetes, cancer, and heart or kidney disease. When should you call for help? Call 911 anytime you think you may need emergency care. For example, call if:  ? · You passed out (lost consciousness). ?Call your doctor now or seek immediate medical care if:  ? · You are confused and cannot think clearly. ? · You are dizzy or lightheaded, or you feel like you may faint. ? · You have signs of needing more fluids. You have sunken eyes and a dry mouth, and you pass only a little dark urine. ? · You cannot keep fluids down. ? Watch closely for changes in your health, and be sure to contact your doctor if:  ? · You are not making tears. ? · Your skin is very dry and sags slowly back into place after you pinch it. ? · Your mouth and eyes are very dry. Where can you learn more? Go to http://leanna-carly.info/. Enter Z561 in the search box to learn more about \"Dehydration: Care Instructions. \"  Current as of: March 20, 2017  Content Version: 11.4  © 2338-6078 [employer]. Care instructions adapted under license by [employer] (which disclaims liability or warranty for this information). If you have questions about a medical condition or this instruction, always ask your healthcare professional. [place] [address] any warranty or liability for your use of this information. Heat Exhaustion: Care Instructions  Your Care Instructions  Heat exhaustion occurs when you are hot, sweat a lot, and do not drink enough to replace the lost fluids. Heat exhaustion is not the same as heatstroke, which is much more serious. Heatstroke can lead to problems with many different organs and can be life-threatening.   After medical care for heat exhaustion, you will need to limit your activities and take good care of your body while it recovers. Follow-up care is a key part of your treatment and safety. Be sure to make and go to all appointments, and call your doctor if you are having problems. It's also a good idea to know your test results and keep a list of the medicines you take. How can you care for yourself at home? · Reduce your activities, and get plenty of rest. Your doctor will give you instructions on when you can resume your normal schedule. · Stay in a cool room for at least the next 24 hours. · Drink rehydration drinks, juices, and water to replace fluids. Drinks such as sports drinks that contain electrolytes work best, because they have salt and minerals. You need salt and minerals as well as water. You are drinking enough fluids when your urine is normal in color (light yellow or clear), and you are urinating every 2 to 4 hours. If you have kidney, heart, or liver disease and have to limit fluids or salt, talk with your doctor before you increase your fluid or salt intake. · Avoid drinks that have caffeine or alcohol. To prevent heat exhaustion  · Drink plenty of fluids, enough so that your urine is light yellow or clear like water. If you have kidney, heart, or liver disease and have to limit fluids, talk with your doctor before you increase the amount of fluids you drink. · Drink plenty of water before, during, and after you are active. This is very important when it is hot out and when you do intense exercise. · During hot weather, wear light-colored clothing that fits loosely and a hat with a brim to reflect the sun. · Limit or avoid strenuous activity during hot or humid weather, especially during the hottest part of the day (10 a.m. to 4 p.m.). Heat exhaustion and heatstroke usually develop when you are working or exercising in hot weather. Humidity makes hot weather even more dangerous. · Cars can get very hot inside.  Open the windows or turn on the air conditioning before you get in and close the doors. · Try to stay cool during hot weather. If your home is not air-conditioned, seek an air-conditioned place. That could be in [employer], a neighborhood café, or a friend's home. [place] yourself with a cool mist. Take a cool shower, bath, or sponge bath. · Be aware that some medicines, such as major tranquilizers, can raise the risk of heat exhaustion. Ask your doctor whether any medicine you take raises your chance of getting heat exhaustion. When should you call for help? Call 911 anytime you think you may need emergency care. For example, call if:  ? · You feel very hot and:  ¨ You have a seizure. ¨ You feel confused. ¨ Your skin is red, hot, and dry. ¨ You passed out (lost consciousness). ?Call your doctor now or seek immediate medical care if:  ? · You cannot keep fluids down. ? · After returning to your normal activities, you have symptoms of heat exhaustion, such as sweating a lot, fatigue, dizziness, or nausea. ? Watch closely for changes in your health, and be sure to contact your doctor if:  ? · You do not get better as expected. Where can you learn more? Go to http://leanna-carly.info/. Enter S222 in the search box to learn more about \"Heat Exhaustion: Care Instructions. \"  Current as of: March 20, 2017  Content Version: 11.4  © 7642-9491 [employer]. Care instructions adapted under license by [employer] (which disclaims liability or warranty for this information). If you have questions about a medical condition or this instruction, always ask your healthcare professional. [place] [address] any warranty or liability for your use of this information. Oral Rehydration: Care Instructions  Your Care Instructions    Dehydration occurs when your body loses too much water. This can happen if you do not drink enough fluids or lose a lot of fluid due to diarrhea, vomiting, or sweating.  Being dehydrated can cause health problems and can even be life-threatening. To replace lost fluids, you need to drink liquid that contains special chemicals called electrolytes. Electrolytes keep your body working well. Plain water does not have electrolytes. You also need to rest to prevent more fluid loss. Replacing water and electrolytes (oral rehydration) completely takes about 36 hours. But you should feel better within a few hours. Follow-up care is a key part of your treatment and safety. Be sure to make and go to all appointments, and call your doctor if you are having problems. It's also a good idea to know your test results and keep a list of the medicines you take. How can you care for yourself at home? · Take frequent sips of a drink such as Gatorade, Powerade, or other rehydration drinks that your doctor suggests. These replace both fluid and important chemicals (electrolytes) you need for balance in your blood. · Drink 2 quarts of cool liquid over 2 to 4 hours. You should have at least 10 glasses of liquid a day to replace lost fluid. If you have kidney, heart, or liver disease and have to limit fluids, talk with your doctor before you increase the amount of fluids you drink. · Make your own drink. Measure everything carefully. The drink may not work well or may even be harmful if the amounts are off. ¨ 1 quart water  ¨ ½ teaspoon salt  ¨ 6 teaspoons sugar  · Do not drink liquid with caffeine, such as coffee and ramiro. · Do not drink any alcohol. It can make you dehydrated. · Drink plenty of fluids, enough so that your urine is light yellow or clear like water. If you have kidney, heart, or liver disease and have to limit fluids, talk with your doctor before you increase the amount of fluids you drink. When should you call for help? Call 911 anytime you think you may need emergency care. For example, call if:  ? · You have signs of severe dehydration, such as:  ¨ You are confused or unable to stay awake.   ¨ You passed out (lost consciousness). ?Call your doctor now or seek immediate medical care if:  ? · You still have signs of dehydration. You have sunken eyes and a dry mouth, and you pass only a little dark urine. ? · You are dizzy or lightheaded, or you feel like you may faint. ? · You are not able to keep down fluids. ? Watch closely for changes in your health, and be sure to contact your doctor if:  ? · You do not get better as expected. Where can you learn more? Go to http://leanna-carly.info/. Enter I040 in the search box to learn more about \"Oral Rehydration: Care Instructions. \"  Current as of: March 20, 2017  Content Version: 11.4  © 4166-1804 [employer]. Care instructions adapted under license by [employer] (which disclaims liability or warranty for this information). If you have questions about a medical condition or this instruction, always ask your healthcare professional. [address] any warranty or liability for your use of this information.

## 2018-06-17 NOTE — ED PROVIDER NOTES
EMERGENCY DEPARTMENT HISTORY AND PHYSICAL EXAM      Date: 6/16/2018  Patient Name: Giuseppe Phipps    History of Presenting Illness     Chief Complaint   Patient presents with    Syncope       History Provided By: Patient    HPI: Giuseppe Phipps, 74 y.o. male with PMHx significant for thyroid disease, and hypercholesteremia, presents via EMS to the ED with cc of new onset syncopal episode that occurred today while the pt was outside at a picnic alongside slight SOB. Per the pt, he was outside since 1100 or 1200 today playing softball for around an hour, and that he sat down on a bench and began to feel dizzy and proceeded to lose consciousness for a few seconds. Prior to playing softball, pt had been outside most of the morning in the heat. Pt also reports that he felt faint and dehydrated before he passed out. The pt states that he did not hit his head. Pt reports that he drank 2-3 bottles of water prior to the syncopal episode. Pt states that he did not urinate before the episode, but reports that he feels like he could urinate now if he had to. Pt states that he feels better now. Pt denies any body cramps or abd pain. Pt denies a hx of heart problems. Pt denies any nausea or vomiting, or CP. Pt does not endorse smoking tobacco and does not drink alcohol. Chief Complaint: syncopal episode  Duration: today   Timing:  Acute  Location: N/A  Quality: N/A  Severity: N/A  Modifying Factors: N/A  Associated Symptoms: mild SOB    There are no other complaints, changes, or physical findings at this time. PCP: Cynthia Muhammad MD    Current Outpatient Prescriptions   Medication Sig Dispense Refill    levothyroxine (SYNTHROID) 125 mcg tablet Take 75 mcg by mouth Daily (before breakfast).  donepezil (ARICEPT) 10 mg tablet take 1 tablet by mouth NIGHTLY 30 Tab 2    atorvastatin (LIPITOR) 20 mg tablet Take 20 mg by mouth nightly.          Past History     Past Medical History:  Past Medical History: Diagnosis Date    Hypercholesteremia     Thyroid disease     hypothyroid       Past Surgical History:  History reviewed. No pertinent surgical history. Family History:  Family History   Problem Relation Age of Onset    Cancer Mother     No Known Problems Father        Social History:  Social History   Substance Use Topics    Smoking status: Never Smoker    Smokeless tobacco: Never Used    Alcohol use No       Allergies:  No Known Allergies      Review of Systems   Review of Systems   Constitutional: Negative. Negative for appetite change, chills, fatigue and fever. HENT: Negative. Negative for congestion, rhinorrhea, sinus pressure and sore throat. Eyes: Negative. Respiratory: Positive for shortness of breath. Negative for cough, choking, chest tightness and wheezing. Cardiovascular: Negative. Negative for chest pain, palpitations and leg swelling. Gastrointestinal: Negative for abdominal pain, constipation, diarrhea, nausea and vomiting. Endocrine: Negative. Genitourinary: Negative. Negative for difficulty urinating, dysuria, flank pain and urgency. Musculoskeletal: Negative. Skin: Negative. Neurological: Positive for syncope. Negative for dizziness, speech difficulty, weakness, light-headedness, numbness and headaches. Psychiatric/Behavioral: Negative. All other systems reviewed and are negative. Physical Exam   Physical Exam   Constitutional: He is oriented to person, place, and time. He appears well-developed and well-nourished. No distress. HENT:   Head: Normocephalic and atraumatic. Mouth/Throat: Oropharynx is clear and moist. No oropharyngeal exudate. Eyes: Conjunctivae and EOM are normal. Pupils are equal, round, and reactive to light. Neck: Normal range of motion. Neck supple. No JVD present. No tracheal deviation present. Cardiovascular: Normal rate, regular rhythm, normal heart sounds and intact distal pulses.     No murmur heard.  Pulmonary/Chest: Effort normal and breath sounds normal. No stridor. No respiratory distress. He has no wheezes. He has no rales. He exhibits no tenderness. Abdominal: Soft. He exhibits no distension. There is no tenderness. There is no rebound and no guarding. Musculoskeletal: Normal range of motion. He exhibits no edema or tenderness. Neurological: He is alert and oriented to person, place, and time. No cranial nerve deficit. No gross motor or sensory deficits    Skin: Skin is warm and dry. He is not diaphoretic. Psychiatric: He has a normal mood and affect. His behavior is normal.   Nursing note and vitals reviewed. Diagnostic Study Results     Labs -     Recent Results (from the past 12 hour(s))   EKG, 12 LEAD, INITIAL    Collection Time: 06/16/18  7:34 PM   Result Value Ref Range    Ventricular Rate 65 BPM    Atrial Rate 65 BPM    P-R Interval 178 ms    QRS Duration 80 ms    Q-T Interval 390 ms    QTC Calculation (Bezet) 405 ms    Calculated P Axis 44 degrees    Calculated R Axis 62 degrees    Calculated T Axis 39 degrees    Diagnosis       Normal sinus rhythm  Normal ECG  When compared with ECG of 17-DEC-2011 00:02,  No significant change was found     CBC WITH AUTOMATED DIFF    Collection Time: 06/16/18  8:21 PM   Result Value Ref Range    WBC 13.8 (H) 4.1 - 11.1 K/uL    RBC 3.68 (L) 4.10 - 5.70 M/uL    HGB 12.4 12.1 - 17.0 g/dL    HCT 36.2 (L) 36.6 - 50.3 %    MCV 98.4 80.0 - 99.0 FL    MCH 33.7 26.0 - 34.0 PG    MCHC 34.3 30.0 - 36.5 g/dL    RDW 14.1 11.5 - 14.5 %    PLATELET 261 921 - 440 K/uL    MPV 10.2 8.9 - 12.9 FL    NRBC 0.0 0  WBC    ABSOLUTE NRBC 0.00 0.00 - 0.01 K/uL    NEUTROPHILS 86 (H) 32 - 75 %    LYMPHOCYTES 6 (L) 12 - 49 %    MONOCYTES 7 5 - 13 %    EOSINOPHILS 1 0 - 7 %    BASOPHILS 0 0 - 1 %    IMMATURE GRANULOCYTES 0 0.0 - 0.5 %    ABS. NEUTROPHILS 11.9 (H) 1.8 - 8.0 K/UL    ABS. LYMPHOCYTES 0.9 0.8 - 3.5 K/UL    ABS. MONOCYTES 0.9 0.0 - 1.0 K/UL    ABS. EOSINOPHILS 0.1 0.0 - 0.4 K/UL    ABS. BASOPHILS 0.0 0.0 - 0.1 K/UL    ABS. IMM. GRANS. 0.1 (H) 0.00 - 0.04 K/UL    DF AUTOMATED     METABOLIC PANEL, COMPREHENSIVE    Collection Time: 06/16/18  8:21 PM   Result Value Ref Range    Sodium 146 (H) 136 - 145 mmol/L    Potassium 3.6 3.5 - 5.1 mmol/L    Chloride 112 (H) 97 - 108 mmol/L    CO2 27 21 - 32 mmol/L    Anion gap 7 5 - 15 mmol/L    Glucose 95 65 - 100 mg/dL    BUN 14 6 - 20 MG/DL    Creatinine 1.38 (H) 0.70 - 1.30 MG/DL    BUN/Creatinine ratio 10 (L) 12 - 20      GFR est AA >60 >60 ml/min/1.73m2    GFR est non-AA 52 (L) >60 ml/min/1.73m2    Calcium 8.7 8.5 - 10.1 MG/DL    Bilirubin, total 0.7 0.2 - 1.0 MG/DL    ALT (SGPT) 22 12 - 78 U/L    AST (SGOT) 22 15 - 37 U/L    Alk. phosphatase 43 (L) 45 - 117 U/L    Protein, total 7.2 6.4 - 8.2 g/dL    Albumin 3.9 3.5 - 5.0 g/dL    Globulin 3.3 2.0 - 4.0 g/dL    A-G Ratio 1.2 1.1 - 2.2     TROPONIN I    Collection Time: 06/16/18  8:21 PM   Result Value Ref Range    Troponin-I, Qt. <0.05 <0.05 ng/mL   CK W/ REFLX CKMB    Collection Time: 06/16/18  8:21 PM   Result Value Ref Range     (H) 39 - 308 U/L   CK-MB,QUANT. Collection Time: 06/16/18  8:21 PM   Result Value Ref Range    CK - MB 1.3 <3.6 NG/ML    CK-MB Index 0.4 0 - 2.5         Radiologic Studies -   No orders to display     CT Results  (Last 48 hours)    None        CXR Results  (Last 48 hours)    None            Medical Decision Making   I am the first provider for this patient. I reviewed the vital signs, available nursing notes, past medical history, past surgical history, family history and social history. Vital Signs-Reviewed the patient's vital signs.   Patient Vitals for the past 12 hrs:   Temp Pulse Resp BP SpO2   06/16/18 2115 - 61 20 113/69 100 %   06/16/18 2100 - 67 16 94/62 100 %   06/16/18 2045 - 69 17 - 92 %   06/16/18 2030 - 78 19 128/77 100 %   06/16/18 2015 - 71 17 122/67 100 %   06/16/18 2000 - 68 14 106/61 100 %   06/16/18 1945 - 72 16 109/65 -   06/16/18 1935 98 °F (36.7 °C) 60 16 120/68 100 %   06/16/18 1934 - 64 12 - 100 %   06/16/18 1932 - - - 120/68 -       Pulse Oximetry Analysis - 100% on RA    Cardiac Monitor:   Rate: 72 bpm  Rhythm: Normal Sinus Rhythm      EKG interpretation: (Preliminary)  NSR, rate 65, normal axis/pr/qrs, no acute ST-T wave changes, Ketan Brandon DO      Records Reviewed: Nursing Notes and Old Medical Records    Provider Notes (Medical Decision Making):   DDx: dehydration, renal failure, heat exhaustion    ED Course:   Initial assessment performed. The patients presenting problems have been discussed, and they are in agreement with the care plan formulated and outlined with them. I have encouraged them to ask questions as they arise throughout their visit. PROGRESS NOTE:  9:26 PM  Pt is ambulatory with no dizziness. Pt feeling much better, IV fluid bolus as well PO intake of wtaer and gatorade. Written by Roya Aguilar, ED Scribe, as dictated by Merlyn Staley DO.    Critical Care Time: 0 minutes    Disposition:  DISCHARGE NOTE  9:23 PM  The patient has been re-evaluated and is ready for discharge. Reviewed available results with patient. Counseled pt on diagnosis and care plan. Pt has expressed understanding, and all questions have been answered. Pt agrees with plan and agrees to F/U as recommended, or return to the ED if their sxs worsen. Discharge instructions have been provided and explained to the pt, along with reasons to return to the ED. Written by Roya Aguilar, ED Scribe, as dictated by Merlyn Staley DO. PLAN:  1. Current Discharge Medication List        2. Follow-up Information     Follow up With Details Comments [facility] [address], [address] [address] [phone]          Return to ED if worse     Diagnosis     Clinical Impression:   1. Heat exhaustion, initial encounter    2. Syncope and collapse    3.  Dehydration Attestations: This note is prepared by Thom Jackson, acting as Scribe for Ashleigh Lucas, [address], : The scribe's documentation has been prepared under my direction and personally reviewed by me in its entirety. I confirm that the note above accurately reflects all work, treatment, procedures, and medical decision making performed by me.

## 2018-07-10 DIAGNOSIS — G31.84 MCI (MILD COGNITIVE IMPAIRMENT): ICD-10-CM

## 2018-07-10 RX ORDER — DONEPEZIL HYDROCHLORIDE 10 MG/1
TABLET, FILM COATED ORAL
Qty: 30 TAB | Refills: 3 | Status: SHIPPED | OUTPATIENT
Start: 2018-07-10 | End: 2018-12-24 | Stop reason: SDUPTHER

## 2018-07-10 NOTE — TELEPHONE ENCOUNTER
Future Appointments  Date Time Provider Jessica Venus   10/8/2018 10:00 AM Jane Willard MD [address]                         Last Appointment My Department:  4/9/2018    Please advise of refill below.    Requested Prescriptions     Pending Prescriptions Disp Refills    donepezil (ARICEPT) 10 mg tablet [Pharmacy Med Name: DONEPEZIL HCL 10 MG TABLET] 30 Tab 3     Sig: take 1 tablet by mouth every evening

## 2018-10-08 ENCOUNTER — OFFICE VISIT (OUTPATIENT)
Dept: NEUROLOGY | Age: 68
End: 2018-10-08

## 2018-10-08 VITALS
WEIGHT: 167 LBS | HEART RATE: 67 BPM | HEIGHT: 69 IN | DIASTOLIC BLOOD PRESSURE: 72 MMHG | SYSTOLIC BLOOD PRESSURE: 118 MMHG | OXYGEN SATURATION: 97 % | BODY MASS INDEX: 24.73 KG/M2

## 2018-10-08 DIAGNOSIS — G31.84 MCI (MILD COGNITIVE IMPAIRMENT): Primary | ICD-10-CM

## 2018-10-08 NOTE — PATIENT INSTRUCTIONS

## 2018-10-08 NOTE — LETTER
10/8/2018 10:37 AM 
 
Patient:    Cleve Gusman YOB: 1950 Date of Visit:    10/8/2018 Dear Eddie Mcneil MD 
 
Thank you for referring Mr. Nohemy Sol to me for evaluation/treatment. Below are the relevant portions of my assessment and plan of care. NEUROLOGY follow-up appointment 10/08/18 Chief Complaint Patient presents with  Follow-up  
  memory Subjective Cleve Gusman is a 79 y.o. male who presented to the neurology office for memory problems. To recap, his memory problems started a few years ago and it has been gradually progressive. It does not interfere with his day-to-day activity but at times he is in the kitchen and he has to think twice where things are kept in the kitchen. He is very good with directions but recently takes him more effort to figure out the directions. He does not have any problems with names. He recently retired from work. He does not have any problems using his phone or a remote control. He or his wife has not noticed any changes in his personality. He is on Aricept 10 mg daily and has noticed improvement in memory. No new symptoms. He sleeps 5 hours and he does snore at night and the wife does state that he sometimes stops breathing at night. He did have a sleep study and it was negative for sleep apnea. Current Outpatient Prescriptions Medication Sig  
 donepezil (ARICEPT) 10 mg tablet take 1 tablet by mouth every evening  levothyroxine (SYNTHROID) 125 mcg tablet Take 75 mcg by mouth Daily (before breakfast).  atorvastatin (LIPITOR) 20 mg tablet Take 20 mg by mouth nightly. No current facility-administered medications for this visit.    
 
REVIEW OF SYSTEMS:  
A ten system review of constitutional, cardiovascular, respiratory, musculoskeletal, endocrine, skin, SHEENT, genitourinary, psychiatric and neurologic systems was obtained and is unremarkable with the exception of the following: Memory problems, hypothyroidism EXAMINATION:  
Visit Vitals  /72  Pulse 67  Ht 5' 9\" (1.753 m)  Wt 167 lb (75.8 kg)  SpO2 97%  BMI 24.66 kg/m2 General:  
General appearance: Pt is in no acute distress Distal pulses are preserved Neurological Examination:  
Mental Status: AAO x3. Speech is fluent. Follows commands, has normal fund of knowledge, attention, short term recall, comprehension and insight. Cranial Nerves: Visual fields are full. PERRL, Extraocular movements are full. Facial sensation intact V1- V3. Facial movement intact, symmetric. Hearing intact to conversation. Palate elevates symmetrically. Shoulder shrug symmetric. Tongue midline. Motor: Strength is 5/5 in all 4 ext. No atrophy. Tone: Normal 
 
Sensation: Normal to light touch Coordination/Cerebellar: Intact to finger-nose-finger Gait: casual gait is normal.  
 
Skin: No significant bruising or lacerations. Mini Mental State Exam 10/8/2018 What is the Year 1 What is the Season 1 What is the Date 1 What is the Day 1 What is the Month 1 Where are we State 1 Where are we Country 1 Where are we [facility] 1 Whree are we Floor 1 Name three objects, then ask the patient to say them 3 Serial sevens Subtract 7 from 100 in increments 5 Ask for the three objects repeated above 3 Name a pencil 1 Name a watch 1 Have the patient repeat this phrase \"No ifs, ands, or buts\" 1 Three stage command: Take the paper in your right hand 1 Fold the paper in half 1 Put the paper on the floor 1 Read and obey the following: CLOSE YOUR EYES 1 Have the patient write a sentence 1 Have the patient copy a figure 1 Mini Mental Score 30 Some recent data might be hidden Laboratory review:  
Results for orders placed or performed during the hospital encounter of 06/16/18 CBC WITH AUTOMATED DIFF Result Value Ref Range WBC 13.8 (H) 4.1 - 11.1 K/uL RBC 3.68 (L) 4.10 - 5.70 M/uL  
 HGB 12.4 12.1 - 17.0 g/dL HCT 36.2 (L) 36.6 - 50.3 % MCV 98.4 80.0 - 99.0 FL  
 MCH 33.7 26.0 - 34.0 PG  
 MCHC 34.3 30.0 - 36.5 g/dL  
 RDW 14.1 11.5 - 14.5 % PLATELET 796 650 - 426 K/uL MPV 10.2 8.9 - 12.9 FL  
 NRBC 0.0 0  WBC ABSOLUTE NRBC 0.00 0.00 - 0.01 K/uL NEUTROPHILS 86 (H) 32 - 75 % LYMPHOCYTES 6 (L) 12 - 49 % MONOCYTES 7 5 - 13 % EOSINOPHILS 1 0 - 7 % BASOPHILS 0 0 - 1 % IMMATURE GRANULOCYTES 0 0.0 - 0.5 % ABS. NEUTROPHILS 11.9 (H) 1.8 - 8.0 K/UL  
 ABS. LYMPHOCYTES 0.9 0.8 - 3.5 K/UL  
 ABS. MONOCYTES 0.9 0.0 - 1.0 K/UL  
 ABS. EOSINOPHILS 0.1 0.0 - 0.4 K/UL  
 ABS. BASOPHILS 0.0 0.0 - 0.1 K/UL  
 ABS. IMM. GRANS. 0.1 (H) 0.00 - 0.04 K/UL  
 DF AUTOMATED METABOLIC PANEL, COMPREHENSIVE Result Value Ref Range Sodium 146 (H) 136 - 145 mmol/L Potassium 3.6 3.5 - 5.1 mmol/L Chloride 112 (H) 97 - 108 mmol/L  
 CO2 27 21 - 32 mmol/L Anion gap 7 5 - 15 mmol/L Glucose 95 65 - 100 mg/dL BUN 14 6 - 20 MG/DL Creatinine 1.38 (H) 0.70 - 1.30 MG/DL  
 BUN/Creatinine ratio 10 (L) 12 - 20 GFR est AA >60 >60 ml/min/1.73m2 GFR est non-AA 52 (L) >60 ml/min/1.73m2 Calcium 8.7 8.5 - 10.1 MG/DL Bilirubin, total 0.7 0.2 - 1.0 MG/DL  
 ALT (SGPT) 22 12 - 78 U/L  
 AST (SGOT) 22 15 - 37 U/L Alk. phosphatase 43 (L) 45 - 117 U/L Protein, total 7.2 6.4 - 8.2 g/dL Albumin 3.9 3.5 - 5.0 g/dL Globulin 3.3 2.0 - 4.0 g/dL A-G Ratio 1.2 1.1 - 2.2    
TROPONIN I Result Value Ref Range Troponin-I, Qt. <0.05 <0.05 ng/mL CK W/ REFLX CKMB Result Value Ref Range  (H) 39 - 308 U/L  
CK-MB,QUANT. Result Value Ref Range CK - MB 1.3 <3.6 NG/ML  
 CK-MB Index 0.4 0 - 2.5 EKG, 12 LEAD, INITIAL Result Value Ref Range Ventricular Rate 65 BPM  
 Atrial Rate 65 BPM  
 P-R Interval 178 ms QRS Duration 80 ms  
 Q-T Interval 390 ms QTC Calculation (Bezet) 405 ms Calculated P Axis 44 degrees Calculated R Axis 62 degrees Calculated T Axis 39 degrees Diagnosis Normal sinus rhythm Normal ECG When compared with ECG of 17-DEC-2011 00:02, No significant change was found Confirmed by Inocente Shipley ([id number]) on 6/17/2018 3:39:29 PM 
  
 
 
Imaging review: 
12/4/2016 MRI of the brain without contrast 
Normal 
I personally reviewed the images in PACS and this is my impression. 2/24/2017 Sleep study No evidence of significant sleep apnea. Snoring disorder. Documentation review: I did review the notes from Dr. Rosibel Pham from 4/14/2017. The patient does have mixed normal/abnormal range neuropsychological testing. There is support for mild adjustment related depression and anxiety. Organic based mood issues would not explain the type of memory loss he is showing. He has marked stressors at home and maintains a very busy lifestyle but that would not explain this type of profile either. In my opinion, then, is that it is likely that he is transitioning into a dementia type process. This profile would be most consistent with mild cognitive impairment with memory loss. My recommendation would include consideration for medication for memory and consider also medication for attention if this is not medically contraindicated. Reversible causes of memory loss need to be fully ruled out such as possible sleep apnea and other medical issues. Follow-up in 6 months. Assessment/Plan:  
Silvana Lake is a 79 y.o. male who presented to the neurology office for management of mild cognitive impairment. His neuropsychological testing is positive for mild cognitive impairment. He is on Aricept 10 mg daily and has noticed improvement in his memory. Will continue the same Follow-up in 6 months Over 25 minutes was spent with the patient and family of which > 50% of the visit was spent counseling on diagnosis, management, and treatment of the diagnosis. I did discuss about mild cognitive impairment and risk for dementia. ICD-10-CM ICD-9-CM 1. MCI (mild cognitive impairment) G31.84 331.83 Paulina Medley MD 
Neurologist and Clinical Neurophysiologist 
 
CC: Frances Mccracken MD 
Fax: [phone] This note will not be viewable in [address] If you have questions, please do not hesitate to call me. I look forward to following Mr. Johns along with you. Sincerely, Paulina Medley MD

## 2018-10-08 NOTE — MR AVS SNAPSHOT
[address] [place] [address], 
[id number], [address] [facility]. 
[phone] Patient: Nneka Horvath MRN: [id number] IOM:[id number] Visit Information Date & Time Provider Department Dept. Phone Encounter #  
 10/8/2018 10:00 AM Sandro Bartholomew MD [facility] [phone] [id number] Upcoming Health Maintenance Date Due Hepatitis C Screening 1950 DTaP/Tdap/Td series (1 - Tdap) 7/23/1973 Shingrix Vaccine Age 50> (1 of 2) 7/23/2002 FOBT Q 1 YEAR AGE 50-75 7/23/2002 GLAUCOMA SCREENING Q2Y 7/23/2017 Pneumococcal 65+ Low/Medium Risk (1 of 2 - PCV13) 7/23/2017 MEDICARE YEARLY EXAM 3/14/2018 Influenza Age 5 to Adult 8/1/2018 Allergies as of 10/8/2018  Review Complete On: 6/16/2018 [facility]: [facility] No Known Allergies Current Immunizations  Never Reviewed No immunizations on file. Not reviewed this visit Vitals BP Pulse Height(growth percentile) Weight(growth percentile) SpO2 BMI  
 118/72 67 5' 9\" (1.753 m) 167 lb (75.8 kg) 97% 24.66 kg/m2 Smoking Status Never Smoker Vitals History BMI and BSA Data Body Mass Index Body Surface Area  
 24.66 kg/m 2 1.92 m 2 Preferred Pharmacy Pharmacy Name Phone [id number] [address] [place] [address] [phone] Your Updated Medication List  
  
   
This list is accurate as of 10/8/18 10:32 AM.  Always use your most recent med list.  
  
  
  
  
 donepezil 10 mg tablet Commonly known as:  ARICEPT  
take 1 tablet by mouth every evening LIPITOR 20 mg tablet Generic drug:  atorvastatin Take 20 mg by mouth nightly. SYNTHROID 125 mcg tablet Generic drug:  levothyroxine Take 75 mcg by mouth Daily (before breakfast). Patient Instructions Office Policies · Phone calls/patient messages: Please allow up to 24 hours for someone in the office to contact you about your call or message. Be mindful your provider may be out of the office or your message may require further review. We encourage you to use [employer] for your messages as this is a faster, more efficient way to communicate with our office · Medication Refills: 
Prescription medications require up to 48 business hours to process. We encourage you to use [employer] for your refills. For controlled medications: Please allow up to 72 business hours to process. Certain medications may require you to  a written prescription at our office. NO narcotic/controlled medications will be prescribed after 4pm Monday through Friday or on weekends · Form/Paperwork Completion: 
Please note there is a $25 fee for all paperwork completed by our providers. We ask that you allow 7-14 business days. Pre-payment is due prior to picking up/faxing the completed form. You may also download your forms to [employer] to have your doctor print off. Introducing [facility] HE[facility]! Dear Elly Vaughn: Thank you for requesting a [employer] account. Our records indicate that you already have an active [employer] account. You can access your account anytime at [email or web address] Did you know that you can access your hospital and ER discharge instructions at any time in [employer]? You can also review all of your test results from your hospital stay or ER visit. Additional Information If you have questions, please visit the Frequently Asked Questions section of the [employer] website at [email or web address] Remember, [employer] is NOT to be used for urgent needs. For medical emergencies, dial 911. Now available from your iPhone and Android! Please provide this summary of care documentation to your next provider. Your primary care clinician is listed as MACI BRAMBILA.  If you have any questions after today's visit, please call [phone].

## 2018-10-08 NOTE — PROGRESS NOTES
NEUROLOGY follow-up appointment    10/08/18    Chief Complaint   Patient presents with    Follow-up     memory       Subjective  Margaret Vargas is a 79 y.o. male who presented to the neurology office for memory problems. To recap, his memory problems started a few years ago and it has been gradually progressive. It does not interfere with his day-to-day activity but at times he is in the kitchen and he has to think twice where things are kept in the kitchen. He is very good with directions but recently takes him more effort to figure out the directions. He does not have any problems with names. He recently retired from work. He does not have any problems using his phone or a remote control. He or his wife has not noticed any changes in his personality. He is on Aricept 10 mg daily and has noticed improvement in memory. No new symptoms. He sleeps 5 hours and he does snore at night and the wife does state that he sometimes stops breathing at night. He did have a sleep study and it was negative for sleep apnea. Current Outpatient Prescriptions   Medication Sig    donepezil (ARICEPT) 10 mg tablet take 1 tablet by mouth every evening    levothyroxine (SYNTHROID) 125 mcg tablet Take 75 mcg by mouth Daily (before breakfast).  atorvastatin (LIPITOR) 20 mg tablet Take 20 mg by mouth nightly. No current facility-administered medications for this visit.       REVIEW OF SYSTEMS:   A ten system review of constitutional, cardiovascular, respiratory, musculoskeletal, endocrine, skin, SHEENT, genitourinary, psychiatric and neurologic systems was obtained and is unremarkable with the exception of the following: Memory problems, hypothyroidism     EXAMINATION:   Visit Vitals    /72    Pulse 67    Ht 5' 9\" (1.753 m)    Wt 167 lb (75.8 kg)    SpO2 97%    BMI 24.66 kg/m2        General:   General appearance: Pt is in no acute distress   Distal pulses are preserved    Neurological Examination:   Mental Status: AAO x3. Speech is fluent. Follows commands, has normal fund of knowledge, attention, short term recall, comprehension and insight. Cranial Nerves: Visual fields are full. PERRL, Extraocular movements are full. Facial sensation intact V1- V3. Facial movement intact, symmetric. Hearing intact to conversation. Palate elevates symmetrically. Shoulder shrug symmetric. Tongue midline. Motor: Strength is 5/5 in all 4 ext. No atrophy. Tone: Normal    Sensation: Normal to light touch    Coordination/Cerebellar: Intact to finger-nose-finger     Gait: casual gait is normal.     Skin: No significant bruising or lacerations.     Mini Mental State Exam 10/8/2018   What is the Year 1   What is the Season 1   What is the Date 1   What is the Day 1   What is the Month 1   Where are we State 1   Where are we Country 1   Where are we [address] [place] [address] or [facility] 1   [place] are we Floor 1   Name three objects, then ask the patient to say them 3   Serial sevens Subtract 7 from 100 in increments 5   Ask for the three objects repeated above 3   Name a pencil 1   Name a watch 1   Have the patient repeat this phrase \"No ifs, ands, or buts\" 1   Three stage command: Take the paper in your right hand 1   Fold the paper in half 1   Put the paper on the floor 1   Read and obey the following: CLOSE YOUR EYES 1   Have the patient write a sentence 1   Have the patient copy a figure 1   Mini Mental Score 30   Some recent data might be hidden     Laboratory review:   Results for orders placed or performed during the hospital encounter of 06/16/18   CBC WITH AUTOMATED DIFF   Result Value Ref Range    WBC 13.8 (H) 4.1 - 11.1 K/uL    RBC 3.68 (L) 4.10 - 5.70 M/uL    HGB 12.4 12.1 - 17.0 g/dL    HCT 36.2 (L) 36.6 - 50.3 %    MCV 98.4 80.0 - 99.0 FL    MCH 33.7 26.0 - 34.0 PG    MCHC 34.3 30.0 - 36.5 g/dL    RDW 14.1 11.5 - 14.5 %    PLATELET 550 085 - 568 K/uL    MPV 10.2 8.9 - 12.9 FL    NRBC 0.0 0  WBC    ABSOLUTE NRBC 0. 00 0.00 - 0.01 K/uL    NEUTROPHILS 86 (H) 32 - 75 %    LYMPHOCYTES 6 (L) 12 - 49 %    MONOCYTES 7 5 - 13 %    EOSINOPHILS 1 0 - 7 %    BASOPHILS 0 0 - 1 %    IMMATURE GRANULOCYTES 0 0.0 - 0.5 %    ABS. NEUTROPHILS 11.9 (H) 1.8 - 8.0 K/UL    ABS. LYMPHOCYTES 0.9 0.8 - 3.5 K/UL    ABS. MONOCYTES 0.9 0.0 - 1.0 K/UL    ABS. EOSINOPHILS 0.1 0.0 - 0.4 K/UL    ABS. BASOPHILS 0.0 0.0 - 0.1 K/UL    ABS. IMM. GRANS. 0.1 (H) 0.00 - 0.04 K/UL    DF AUTOMATED     METABOLIC PANEL, COMPREHENSIVE   Result Value Ref Range    Sodium 146 (H) 136 - 145 mmol/L    Potassium 3.6 3.5 - 5.1 mmol/L    Chloride 112 (H) 97 - 108 mmol/L    CO2 27 21 - 32 mmol/L    Anion gap 7 5 - 15 mmol/L    Glucose 95 65 - 100 mg/dL    BUN 14 6 - 20 MG/DL    Creatinine 1.38 (H) 0.70 - 1.30 MG/DL    BUN/Creatinine ratio 10 (L) 12 - 20      GFR est AA >60 >60 ml/min/1.73m2    GFR est non-AA 52 (L) >60 ml/min/1.73m2    Calcium 8.7 8.5 - 10.1 MG/DL    Bilirubin, total 0.7 0.2 - 1.0 MG/DL    ALT (SGPT) 22 12 - 78 U/L    AST (SGOT) 22 15 - 37 U/L    Alk. phosphatase 43 (L) 45 - 117 U/L    Protein, total 7.2 6.4 - 8.2 g/dL    Albumin 3.9 3.5 - 5.0 g/dL    Globulin 3.3 2.0 - 4.0 g/dL    A-G Ratio 1.2 1.1 - 2.2     TROPONIN I   Result Value Ref Range    Troponin-I, Qt. <0.05 <0.05 ng/mL   CK W/ REFLX CKMB   Result Value Ref Range     (H) 39 - 308 U/L   CK-MB,QUANT.    Result Value Ref Range    CK - MB 1.3 <3.6 NG/ML    CK-MB Index 0.4 0 - 2.5     EKG, 12 LEAD, INITIAL   Result Value Ref Range    Ventricular Rate 65 BPM    Atrial Rate 65 BPM    P-R Interval 178 ms    QRS Duration 80 ms    Q-T Interval 390 ms    QTC Calculation (Bezet) 405 ms    Calculated P Axis 44 degrees    Calculated R Axis 62 degrees    Calculated T Axis 39 degrees    Diagnosis       Normal sinus rhythm  Normal ECG  When compared with ECG of 17-DEC-2011 00:02,  No significant change was found  Confirmed by Monique Moore ([id number]) on 6/17/2018 3:39:29 PM         Imaging review:  12/4/2016  MRI of the brain without contrast  Normal  I personally reviewed the images in PACS and this is my impression. 2/24/2017  Sleep study  No evidence of significant sleep apnea. Snoring disorder. Documentation review:  I did review the notes from Dr. Deborah Ramirez from 4/14/2017. The patient does have mixed normal/abnormal range neuropsychological testing. There is support for mild adjustment related depression and anxiety. Organic based mood issues would not explain the type of memory loss he is showing. He has marked stressors at home and maintains a very busy lifestyle but that would not explain this type of profile either. In my opinion, then, is that it is likely that he is transitioning into a dementia type process. This profile would be most consistent with mild cognitive impairment with memory loss. My recommendation would include consideration for medication for memory and consider also medication for attention if this is not medically contraindicated. Reversible causes of memory loss need to be fully ruled out such as possible sleep apnea and other medical issues. Follow-up in 6 months. Assessment/Plan:   Adonis Calle is a 79 y.o. male who presented to the neurology office for management of mild cognitive impairment. His neuropsychological testing is positive for mild cognitive impairment. He is on Aricept 10 mg daily and has noticed improvement in his memory. Will continue the same    Follow-up in 6 months    Over 25 minutes was spent with the patient and family of which > 50% of the visit was spent counseling on diagnosis, management, and treatment of the diagnosis. I did discuss about mild cognitive impairment and risk for dementia. ICD-10-CM ICD-9-CM    1.  MCI (mild cognitive impairment) G31.84 331.83         Homero Condon MD  Neurologist and Clinical Neurophysiologist    CC: Dallin Menon MD  Fax: [phone]    This note will not be viewable in [address]

## 2018-11-14 RX ORDER — CHOLECALCIFEROL (VITAMIN D3) 125 MCG
500000 CAPSULE ORAL
COMMUNITY

## 2018-11-15 ENCOUNTER — ANESTHESIA (OUTPATIENT)
Dept: ENDOSCOPY | Age: 68
End: 2018-11-15
Payer: MEDICARE

## 2018-11-15 ENCOUNTER — ANESTHESIA EVENT (OUTPATIENT)
Dept: ENDOSCOPY | Age: 68
End: 2018-11-15
Payer: MEDICARE

## 2018-11-15 ENCOUNTER — HOSPITAL ENCOUNTER (OUTPATIENT)
Age: 68
Setting detail: OUTPATIENT SURGERY
Discharge: HOME | End: 2018-11-15
Attending: INTERNAL MEDICINE | Admitting: INTERNAL MEDICINE
Payer: MEDICARE

## 2018-11-15 VITALS
DIASTOLIC BLOOD PRESSURE: 80 MMHG | HEIGHT: 69 IN | WEIGHT: 166.06 LBS | BODY MASS INDEX: 24.59 KG/M2 | HEART RATE: 58 BPM | RESPIRATION RATE: 10 BRPM | OXYGEN SATURATION: 100 % | SYSTOLIC BLOOD PRESSURE: 129 MMHG | TEMPERATURE: 97.6 F

## 2018-11-15 PROCEDURE — 74011250636 HC RX REV CODE- 250/636: Performed by: INTERNAL MEDICINE

## 2018-11-15 PROCEDURE — 76060000031 HC ANESTHESIA FIRST 0.5 HR: Performed by: INTERNAL MEDICINE

## 2018-11-15 PROCEDURE — 76040000019: Performed by: INTERNAL MEDICINE

## 2018-11-15 PROCEDURE — 74011250636 HC RX REV CODE- 250/636

## 2018-11-15 PROCEDURE — 74011000250 HC RX REV CODE- 250

## 2018-11-15 RX ORDER — PROPOFOL 10 MG/ML
INJECTION, EMULSION INTRAVENOUS AS NEEDED
Status: DISCONTINUED | OUTPATIENT
Start: 2018-11-15 | End: 2018-11-15 | Stop reason: HOSPADM

## 2018-11-15 RX ORDER — SODIUM CHLORIDE 0.9 % (FLUSH) 0.9 %
5-10 SYRINGE (ML) INJECTION EVERY 8 HOURS
Status: DISCONTINUED | OUTPATIENT
Start: 2018-11-15 | End: 2018-11-15 | Stop reason: HOSPADM

## 2018-11-15 RX ORDER — DEXTROMETHORPHAN/PSEUDOEPHED 2.5-7.5/.8
1.2 DROPS ORAL
Status: DISCONTINUED | OUTPATIENT
Start: 2018-11-15 | End: 2018-11-15 | Stop reason: HOSPADM

## 2018-11-15 RX ORDER — NALOXONE HYDROCHLORIDE 0.4 MG/ML
0.4 INJECTION, SOLUTION INTRAMUSCULAR; INTRAVENOUS; SUBCUTANEOUS
Status: DISCONTINUED | OUTPATIENT
Start: 2018-11-15 | End: 2018-11-15 | Stop reason: HOSPADM

## 2018-11-15 RX ORDER — SODIUM CHLORIDE 9 MG/ML
75 INJECTION, SOLUTION INTRAVENOUS CONTINUOUS
Status: DISCONTINUED | OUTPATIENT
Start: 2018-11-15 | End: 2018-11-15 | Stop reason: HOSPADM

## 2018-11-15 RX ORDER — FLUMAZENIL 0.1 MG/ML
0.2 INJECTION INTRAVENOUS
Status: DISCONTINUED | OUTPATIENT
Start: 2018-11-15 | End: 2018-11-15 | Stop reason: HOSPADM

## 2018-11-15 RX ORDER — MIDAZOLAM HYDROCHLORIDE 1 MG/ML
.25-5 INJECTION, SOLUTION INTRAMUSCULAR; INTRAVENOUS
Status: DISCONTINUED | OUTPATIENT
Start: 2018-11-15 | End: 2018-11-15 | Stop reason: HOSPADM

## 2018-11-15 RX ORDER — EPINEPHRINE 0.1 MG/ML
1 INJECTION INTRACARDIAC; INTRAVENOUS
Status: DISCONTINUED | OUTPATIENT
Start: 2018-11-15 | End: 2018-11-15 | Stop reason: HOSPADM

## 2018-11-15 RX ORDER — FENTANYL CITRATE 50 UG/ML
50 INJECTION, SOLUTION INTRAMUSCULAR; INTRAVENOUS
Status: DISCONTINUED | OUTPATIENT
Start: 2018-11-15 | End: 2018-11-15 | Stop reason: HOSPADM

## 2018-11-15 RX ORDER — ATROPINE SULFATE 0.1 MG/ML
0.5 INJECTION INTRAVENOUS
Status: DISCONTINUED | OUTPATIENT
Start: 2018-11-15 | End: 2018-11-15 | Stop reason: HOSPADM

## 2018-11-15 RX ORDER — EPHEDRINE SULFATE 50 MG/ML
INJECTION, SOLUTION INTRAVENOUS AS NEEDED
Status: DISCONTINUED | OUTPATIENT
Start: 2018-11-15 | End: 2018-11-15 | Stop reason: HOSPADM

## 2018-11-15 RX ORDER — SODIUM CHLORIDE 0.9 % (FLUSH) 0.9 %
5-10 SYRINGE (ML) INJECTION AS NEEDED
Status: DISCONTINUED | OUTPATIENT
Start: 2018-11-15 | End: 2018-11-15 | Stop reason: HOSPADM

## 2018-11-15 RX ORDER — LIDOCAINE HYDROCHLORIDE 20 MG/ML
INJECTION, SOLUTION EPIDURAL; INFILTRATION; INTRACAUDAL; PERINEURAL AS NEEDED
Status: DISCONTINUED | OUTPATIENT
Start: 2018-11-15 | End: 2018-11-15 | Stop reason: HOSPADM

## 2018-11-15 RX ADMIN — PROPOFOL 30 MG: 10 INJECTION, EMULSION INTRAVENOUS at 10:55

## 2018-11-15 RX ADMIN — PROPOFOL 50 MG: 10 INJECTION, EMULSION INTRAVENOUS at 10:53

## 2018-11-15 RX ADMIN — PROPOFOL 50 MG: 10 INJECTION, EMULSION INTRAVENOUS at 10:45

## 2018-11-15 RX ADMIN — EPHEDRINE SULFATE 10 MG: 50 INJECTION, SOLUTION INTRAVENOUS at 11:00

## 2018-11-15 RX ADMIN — PROPOFOL 50 MG: 10 INJECTION, EMULSION INTRAVENOUS at 10:49

## 2018-11-15 RX ADMIN — LIDOCAINE HYDROCHLORIDE 60 MG: 20 INJECTION, SOLUTION EPIDURAL; INFILTRATION; INTRACAUDAL; PERINEURAL at 10:42

## 2018-11-15 RX ADMIN — PROPOFOL 70 MG: 10 INJECTION, EMULSION INTRAVENOUS at 10:42

## 2018-11-15 RX ADMIN — SODIUM CHLORIDE 75 ML/HR: 900 INJECTION, SOLUTION INTRAVENOUS at 10:36

## 2018-11-15 NOTE — ANESTHESIA PREPROCEDURE EVALUATION
Anesthetic History No history of anesthetic complications Review of Systems / Medical History Patient summary reviewed, nursing notes reviewed and pertinent labs reviewed Pulmonary Within defined limits Neuro/Psych Within defined limits Cardiovascular Within defined limits Exercise tolerance: >4 METS 
  
GI/Hepatic/Renal 
Within defined limits Endo/Other Within defined limits Hypothyroidism Other Findings Comments: Hypercholesteremia Physical Exam 
 
Airway Mallampati: II 
TM Distance: 4 - 6 cm Neck ROM: normal range of motion Mouth opening: Normal 
 
 Cardiovascular Regular rate and rhythm,  S1 and S2 normal,  no murmur, click, rub, or gallop Dental 
No notable dental hx Pulmonary Breath sounds clear to auscultation Abdominal 
GI exam deferred Other Findings Anesthetic Plan ASA: 2 Anesthesia type: general and total IV anesthesia Induction: Intravenous Anesthetic plan and risks discussed with: Patient Propofol MAC

## 2018-11-15 NOTE — PROGRESS NOTES
Anesthesia reports 250 mg Propofol, 60 mg Lidocaine, 10 mg ephedrine and 150 ml of Normal Saline were given during procedure. Received report from anesthesia staff on vital signs and status of patient.

## 2018-11-15 NOTE — ROUTINE PROCESS
[facility] 1950 
[id number] Situation: 
Verbal report received from: Shy Knife Procedure: Procedure(s): 
COLONOSCOPY Background: 
 
Preoperative diagnosis: HISTORY COLON POLYPS Postoperative diagnosis: hemorrhoids; diverticulosis :  Dr. Cherelle Covington Assistant(s): Endoscopy Technician-1: Ozzie Kohler Endoscopy RN-1: Kirk Seay RN Specimens: * No specimens in log * H. Pylori  no Assessment: 
Intra-procedure medications Anesthesia gave intra-procedure sedation and medications, see anesthesia flow sheet yes Intravenous fluids: NS@ Edie Body Vital signs stable Abdominal assessment: round and soft Recommendation: 
Discharge patient per MD order. Family or [place] Wife Permission to share finding with family or friend yes

## 2018-11-15 NOTE — H&P
Gastroenterology Outpatient History and Physical 
 
Patient: Britt Fierro Physician: Elly Wade MD 
 
Chief Complaint: H/o colon polyps History of Present Illness: No GI complaints History: 
Past Medical History:  
Diagnosis Date  Hypercholesteremia  Thyroid disease   
 hypothyroid History reviewed. No pertinent surgical history. Social History Socioeconomic History  Marital status:  Spouse name: Not on file  Number of children: Not on file  Years of education: Not on file  Highest education level: Not on file Social Needs  Financial resource strain: Not on file  Food insecurity - worry: Not on file  Food insecurity - inability: Not on file  Transportation needs - medical: Not on file  Transportation needs - non-medical: Not on file Occupational History  Not on file Tobacco Use  Smoking status: Never Smoker  Smokeless tobacco: Never Used Substance and Sexual Activity  Alcohol use: No  
 Drug use: No  
 Sexual activity: Not on file Other Topics Concern  Not on file Social History Narrative  Not on file Family History Problem Relation Age of Onset  Cancer Mother  No Known Problems Father There is no problem list on file for this patient. Allergies: No Known Allergies Medications:  
Prior to Admission medications Medication Sig Start Date End Date Taking? Authorizing Provider  
cholecalciferol, vitamin D3, (VITAMIN D3) 2,000 unit tab Take  by mouth. Yes Provider, Historical  
donepezil (ARICEPT) 10 mg tablet take 1 tablet by mouth every evening 7/10/18  Yes Leia Thao MD  
levothyroxine (SYNTHROID) 125 mcg tablet Take 75 mcg by mouth Daily (before breakfast). Yes [place], MD Fortino  
atorvastatin (LIPITOR) 20 mg tablet Take 20 mg by mouth nightly.    Yes Other, MD Fortino  
 
Physical Exam:  
Vital Signs: Blood pressure (!) 136/93, pulse 72, temperature 97.6 °F (36.4 °C), resp. rate 16, height 5' 9\" (1.753 m), weight 75.3 kg (166 lb 1 oz), SpO2 100 %. General: well developed, well nourished HEENT: unremarkable Heart: regular rhythm no mumur Lungs: clear Abdominal:  benign Neurological: unremarkable Extremities: no edema Findings/Diagnosis: H/o colon polyps Plan of Care/Planned Procedure: Colonoscopy with conscious/deep sedation Signed: 
Allie Lopez MD 11/15/2018

## 2018-11-15 NOTE — ANESTHESIA POSTPROCEDURE EVALUATION
Procedure(s): 
COLONOSCOPY. Anesthesia Post Evaluation Patient location during evaluation: PACU Note status: Adequate. Level of consciousness: responsive to verbal stimuli and sleepy but conscious Pain management: satisfactory to patient Airway patency: patent Anesthetic complications: no 
Cardiovascular status: acceptable Respiratory status: acceptable Hydration status: acceptable Comments: +Post-Anesthesia Evaluation and Assessment Patient: Carmelina Cast MRN: [id number]  SSN: [id number] YOB: 1950  Age: 79 y.o. Sex: male Cardiovascular Function/Vital Signs /80   Pulse (!) 58   Temp 36.4 °C (97.6 °F)   Resp 10   Ht 5' 9\" (1.753 m)   Wt 75.3 kg (166 lb 1 oz)   SpO2 100%   BMI 24.52 kg/m² Patient is status post Procedure(s): 
COLONOSCOPY. Nausea/Vomiting: Controlled. Postoperative hydration reviewed and adequate. Pain: 
Pain Scale 1: Numeric (0 - 10) (11/15/18 1129) Pain Intensity 1: 0 (11/15/18 1129) Managed. Neurological Status: At baseline. Mental Status and Level of Consciousness: Arousable. Pulmonary Status:  
O2 Device: Room air (11/15/18 1129) Adequate oxygenation and airway patent. Complications related to anesthesia: None Post-anesthesia assessment completed. No concerns. Signed By: Bg Motley MD  
 11/15/2018 Post anesthesia nausea and vomiting:  controlled Visit Vitals /80 Pulse (!) 58 Temp 36.4 °C (97.6 °F) Resp 10 Ht 5' 9\" (1.753 m) Wt 75.3 kg (166 lb 1 oz) SpO2 100% BMI 24.52 kg/m²

## 2018-11-15 NOTE — PROGRESS NOTES
Regarding discharge instructions:  Discharge instructions reviewed with the patient and with his wife Kate Munson. She verbalizes understanding of instructions and she did the electronic signature with the RN to verify she has received the instructions. It was difficult to confirm by the signature in [address] [place] [address] did or did not receive the signatures, as computer  froze  And had to do a restart of the computer after patient discharge.

## 2018-11-15 NOTE — DISCHARGE INSTRUCTIONS
Reese Spann  [id number]  1950    COLON DISCHARGE INSTRUCTIONS  Discomfort:  Redness at IV site- apply warm compress to area; if redness or soreness persist- contact your physician  There may be a slight amount of blood passed from the rectum  Gaseous discomfort- walking, belching will help relieve any discomfort  You may not operate a vehicle for 12 hours  You may not engage in an occupation involving machinery or appliances for rest of today  You may not drink alcoholic beverages for at least 12 hours  Avoid making any critical decisions for at least 24 hour  DIET:   Regular diet. - however -  remember your colon is empty and a heavy meal will produce gas. Avoid these foods:  vegetables, fried / greasy foods, carbonated drinks for today  MEDICATION:  Per Medication Reconciliation       ACTIVITY:  You may not resume your normal daily activities until tomorrow AM; it is recommended that you spend the remainder of the day resting -  avoid any strenuous activity. CALL M.D. ANY SIGN OF:   Increasing pain, nausea, vomiting  Abdominal distension (swelling)  New increased bleeding (oral or rectal)  Fever (chills)  Pain in chest area  Bloody discharge from nose or mouth  Shortness of breath    You may  take any Advil, Aspirin, Ibuprofen, Motrin, Aleve, or Goodys for 10 days, ONLY  Tylenol as needed for pain. IMPRESSION:  Impression:   1. Moderate sigmoid diverticulosis  2. Large internal hemorrhoids seen on retroflexion  3. Otherwise normal colonoscopy through to the cecum    Recommendations:   1.  Repeat colonoscopy in 5 years for surveillance    Follow-up Instructions:  Telephone # [phone]    Best Rodriguez MD

## 2018-11-15 NOTE — PROCEDURES
NAME:  Jared Maldonado   :   1950   MRN:   [id number]     Date/Time:  11/15/2018 10:56 AM    Colonoscopy Operative Report    Procedure Type:   Colonoscopy --screening     Indications:     Personal history of colon polyps (screening only)  Pre-operative Diagnosis: see indication above  Post-operative Diagnosis:  See findings below  :  Narciso Snell MD  Referring Provider: --Isha West MD    Exam:  Airway: clear, no airway problems anticipated  Heart: RRR, without gallops or rubs  Lungs: clear bilaterally without wheezes, crackles, or rhonchi  Abdomen: soft, nontender, nondistended, bowel sounds present  Mental Status: awake, alert and oriented to person, place and time    Sedation:  MAC anesthesia Propofol  Procedure Details:  After informed consent was obtained with all risks and benefits of procedure explained and preoperative exam completed, the patient was taken to the endoscopy suite and placed in the left lateral decubitus position. Upon sequential sedation as per above, a digital rectal exam was performed demonstrating internal hemorrhoids. The Olympus videocolonoscope  was inserted in the rectum and carefully advanced to the cecum, which was identified by the ileocecal valve and appendiceal orifice. The quality of preparation was good. The colonoscope was slowly withdrawn with careful evaluation between folds. Retroflexion in the rectum was completed demonstrating internal hemorrhoids. Findings:  1. Moderate sigmoid diverticulosis  2. Large internal hemorrhoids seen on retroflexion  3. Otherwise normal colonoscopy through to the cecum    Specimen Removed:  None  Complications: None. EBL:  None. Impression:   1. Moderate sigmoid diverticulosis  2. Large internal hemorrhoids seen on retroflexion  3. Otherwise normal colonoscopy through to the cecum    Recommendations:   1.  Repeat colonoscopy in 5 years for surveillance    Discharge Disposition:  Home in the company of a  when able to ambulate.       Elly Wade MD

## 2018-12-24 DIAGNOSIS — G31.84 MCI (MILD COGNITIVE IMPAIRMENT): ICD-10-CM

## 2018-12-24 RX ORDER — DONEPEZIL HYDROCHLORIDE 10 MG/1
TABLET, FILM COATED ORAL
Qty: 30 TAB | Refills: 3 | Status: SHIPPED | OUTPATIENT
Start: 2018-12-24 | End: 2019-04-02 | Stop reason: SDUPTHER

## 2018-12-24 NOTE — TELEPHONE ENCOUNTER
Patient called to request a refill on his  \"Donepezil,\" he is completely out of medication.       [phone]

## 2018-12-24 NOTE — TELEPHONE ENCOUNTER
No future appointments. Last Appointment My Department:  10/8/2018    Would you like to refill below?     Requested Prescriptions     Pending Prescriptions Disp Refills    donepezil (ARICEPT) 10 mg tablet 30 Tab 3     Sig: take 1 tablet by mouth every evening

## 2018-12-24 NOTE — TELEPHONE ENCOUNTER
----- Message from Flaquito Antonio sent at 12/24/2018 10:50 AM EST -----  Regarding: Dr Taylor/rx refill  Pt (p) [phone], pt following up on rx refill request from this morning for his [address], the one listed in his chart, he would like to know if the rx for his Donepezil  can be called in today, because he has no more left.

## 2019-04-02 ENCOUNTER — OFFICE VISIT (OUTPATIENT)
Dept: NEUROLOGY | Age: 69
End: 2019-04-02

## 2019-04-02 VITALS
OXYGEN SATURATION: 98 % | HEIGHT: 69 IN | HEART RATE: 74 BPM | DIASTOLIC BLOOD PRESSURE: 65 MMHG | WEIGHT: 169 LBS | BODY MASS INDEX: 25.03 KG/M2 | SYSTOLIC BLOOD PRESSURE: 110 MMHG

## 2019-04-02 DIAGNOSIS — G31.84 MCI (MILD COGNITIVE IMPAIRMENT): Primary | ICD-10-CM

## 2019-04-02 RX ORDER — DONEPEZIL HYDROCHLORIDE 10 MG/1
TABLET, FILM COATED ORAL
Qty: 90 TAB | Refills: 1 | Status: SHIPPED | OUTPATIENT
Start: 2019-04-02 | End: 2019-08-26 | Stop reason: SDUPTHER

## 2019-04-02 NOTE — LETTER
4/2/19 Patient: Nalini Moran YOB: 1950 Date of Visit: 4/2/2019 Jacklyn Sheth MD 
[id number]  [address] [address] VIA In Basket Dear Jacklyn Sheth MD, Thank you for referring Mr. Madelin Mcclendon to [address] [place] [address] for evaluation. My notes for this consultation are attached. If you have questions, please do not hesitate to call me. I look forward to following your patient along with you. Sincerely, Jane Willard MD

## 2019-04-02 NOTE — PROGRESS NOTES
NEUROLOGY follow-up appointment        Chief Complaint   Patient presents with    Follow-up     cognitive       Subjective  Pa Gibbs is a 79 y.o. male who presented to the neurology office for memory problems. To recap, his memory problems started a few years ago and it has been gradually progressive. It does not interfere with his day-to-day activity but at times he is in the kitchen and he has to think twice where things are kept in the kitchen. He is very good with directions but recently takes him more effort to figure out the directions. He does not have any problems with names. He recently retired from work. He does not have any problems using his phone or a remote control. He or his wife has not noticed any changes in his personality. He is on Aricept 10 mg daily and has noticed improvement in memory. No new symptoms. He sleeps 5 hours and he does snore at night and the wife does state that he sometimes stops breathing at night. He did have a sleep study and it was negative for sleep apnea. Current Outpatient Medications   Medication Sig    donepezil (ARICEPT) 10 mg tablet take 1 tablet by mouth every evening    cholecalciferol, vitamin D3, (VITAMIN D3) 2,000 unit tab Take 500,000 Units by mouth. Taking 1 time a week    levothyroxine (SYNTHROID) 125 mcg tablet Take 75 mcg by mouth Daily (before breakfast).  atorvastatin (LIPITOR) 20 mg tablet Take 20 mg by mouth nightly. No current facility-administered medications for this visit.       REVIEW OF SYSTEMS:   A ten system review of constitutional, cardiovascular, respiratory, musculoskeletal, endocrine, skin, SHEENT, genitourinary, psychiatric and neurologic systems was obtained and is unremarkable with the exception of the following: Memory problems, hypothyroidism     EXAMINATION:   Visit Vitals  /65   Pulse 74   Ht 5' 9\" (1.753 m)   Wt 169 lb (76.7 kg)   SpO2 98%   BMI 24.96 kg/m²        General:   General appearance: Pt is in no acute distress   Distal pulses are preserved    Neurological Examination:   Mental Status: AAO x3. Speech is fluent. Follows commands, has normal fund of knowledge, attention, short term recall, comprehension and insight. Cranial Nerves: Visual fields are full. PERRL, Extraocular movements are full. Facial sensation intact V1- V3. Facial movement intact, symmetric. Hearing intact to conversation. Palate elevates symmetrically. Shoulder shrug symmetric. Tongue midline. Motor: Strength is 5/5 in all 4 ext. No atrophy. Tone: Normal    Sensation: Normal to light touch    Coordination/Cerebellar: Intact to finger-nose-finger     Gait: casual gait is normal.     Skin: No significant bruising or lacerations.     Mini Mental State Exam 4/2/2019   What is the Year 1   What is the Season 1   What is the Date 1   What is the Day 1   What is the Month 1   Where are we State 1   Where are we Country 1   Where are we [place] or [place] 1   Where are we Floor 1   Name three objects, then ask the patient to say them 3   Serial sevens Subtract 7 from 100 in increments 5   Ask for the three objects repeated above 3   Name a pencil 1   Name a watch 1   Have the patient repeat this phrase \"No ifs, ands, or buts\" 1   Three stage command: Take the paper in your right hand 1   Fold the paper in half 1   Put the paper on the floor 1   Read and obey the following: CLOSE YOUR EYES 1   Have the patient write a sentence 1   Have the patient copy a figure 1   Mini Mental Score 30   Some recent data might be hidden     Laboratory review:   Results for orders placed or performed during the hospital encounter of 06/16/18   CBC WITH AUTOMATED DIFF   Result Value Ref Range    WBC 13.8 (H) 4.1 - 11.1 K/uL    RBC 3.68 (L) 4.10 - 5.70 M/uL    HGB 12.4 12.1 - 17.0 g/dL    HCT 36.2 (L) 36.6 - 50.3 %    MCV 98.4 80.0 - 99.0 FL    MCH 33.7 26.0 - 34.0 PG    MCHC 34.3 30.0 - 36.5 g/dL    RDW 14.1 11.5 - 14.5 %    PLATELET 823 687 - 400 K/uL    MPV 10.2 8.9 - 12.9 FL    NRBC 0.0 0  WBC    ABSOLUTE NRBC 0.00 0.00 - 0.01 K/uL    NEUTROPHILS 86 (H) 32 - 75 %    LYMPHOCYTES 6 (L) 12 - 49 %    MONOCYTES 7 5 - 13 %    EOSINOPHILS 1 0 - 7 %    BASOPHILS 0 0 - 1 %    IMMATURE GRANULOCYTES 0 0.0 - 0.5 %    ABS. NEUTROPHILS 11.9 (H) 1.8 - 8.0 K/UL    ABS. LYMPHOCYTES 0.9 0.8 - 3.5 K/UL    ABS. MONOCYTES 0.9 0.0 - 1.0 K/UL    ABS. EOSINOPHILS 0.1 0.0 - 0.4 K/UL    ABS. BASOPHILS 0.0 0.0 - 0.1 K/UL    ABS. IMM. GRANS. 0.1 (H) 0.00 - 0.04 K/UL    DF AUTOMATED     METABOLIC PANEL, COMPREHENSIVE   Result Value Ref Range    Sodium 146 (H) 136 - 145 mmol/L    Potassium 3.6 3.5 - 5.1 mmol/L    Chloride 112 (H) 97 - 108 mmol/L    CO2 27 21 - 32 mmol/L    Anion gap 7 5 - 15 mmol/L    Glucose 95 65 - 100 mg/dL    BUN 14 6 - 20 MG/DL    Creatinine 1.38 (H) 0.70 - 1.30 MG/DL    BUN/Creatinine ratio 10 (L) 12 - 20      GFR est AA >60 >60 ml/min/1.73m2    GFR est non-AA 52 (L) >60 ml/min/1.73m2    Calcium 8.7 8.5 - 10.1 MG/DL    Bilirubin, total 0.7 0.2 - 1.0 MG/DL    ALT (SGPT) 22 12 - 78 U/L    AST (SGOT) 22 15 - 37 U/L    Alk. phosphatase 43 (L) 45 - 117 U/L    Protein, total 7.2 6.4 - 8.2 g/dL    Albumin 3.9 3.5 - 5.0 g/dL    Globulin 3.3 2.0 - 4.0 g/dL    A-G Ratio 1.2 1.1 - 2.2     TROPONIN I   Result Value Ref Range    Troponin-I, Qt. <0.05 <0.05 ng/mL   CK W/ REFLX CKMB   Result Value Ref Range     (H) 39 - 308 U/L   CK-MB,QUANT.    Result Value Ref Range    CK - MB 1.3 <3.6 NG/ML    CK-MB Index 0.4 0 - 2.5     EKG, 12 LEAD, INITIAL   Result Value Ref Range    Ventricular Rate 65 BPM    Atrial Rate 65 BPM    P-R Interval 178 ms    QRS Duration 80 ms    Q-T Interval 390 ms    QTC Calculation (Bezet) 405 ms    Calculated P Axis 44 degrees    Calculated R Axis 62 degrees    Calculated T Axis 39 degrees    Diagnosis       Normal sinus rhythm  Normal ECG  When compared with ECG of 17-DEC-2011 00:02,  No significant change was found  Confirmed by Deankrisjosh Lemus ([id number]) on 6/17/2018 3:39:29 PM         Imaging review:  12/4/2016  MRI of the brain without contrast  Normal  I personally reviewed the images in PACS and this is my impression. 2/24/2017  Sleep study  No evidence of significant sleep apnea. Snoring disorder. Documentation review:  I did review the notes from Dr. Yumiko Sawant from 4/14/2017. The patient does have mixed normal/abnormal range neuropsychological testing. There is support for mild adjustment related depression and anxiety. Organic based mood issues would not explain the type of memory loss he is showing. He has marked stressors at home and maintains a very busy lifestyle but that would not explain this type of profile either. In my opinion, then, is that it is likely that he is transitioning into a dementia type process. This profile would be most consistent with mild cognitive impairment with memory loss. My recommendation would include consideration for medication for memory and consider also medication for attention if this is not medically contraindicated. Reversible causes of memory loss need to be fully ruled out such as possible sleep apnea and other medical issues. Follow-up in 6 months. Assessment/Plan:   Tashia Fink is a 79 y.o. male who presented to the neurology office for management of mild cognitive impairment. His neuropsychological testing is positive for mild cognitive impairment. He is on Aricept 10 mg daily and has noticed improvement in his memory. Will continue the same. His Mini-Mental Status Examination stable. Follow-up in 6 months    Over 25 minutes was spent with the patient and family of which > 50% of the visit was spent counseling on diagnosis, management, and treatment of the diagnosis. I did discuss about mild cognitive impairment and risk for dementia. ICD-10-CM ICD-9-CM    1.  MCI (mild cognitive impairment) G31.84 331.83 donepezil (ARICEPT) 10 mg tablet        Aguilar Lucio Duane, MD  Neurologist and Clinical Neurophysiologist    CC: Keanu Hanley MD  Fax: [phone]    This note will not be viewable in [address]

## 2019-10-08 ENCOUNTER — OFFICE VISIT (OUTPATIENT)
Dept: NEUROLOGY | Age: 69
End: 2019-10-08

## 2019-10-08 VITALS
HEIGHT: 69 IN | BODY MASS INDEX: 24.44 KG/M2 | WEIGHT: 165 LBS | HEART RATE: 65 BPM | DIASTOLIC BLOOD PRESSURE: 75 MMHG | OXYGEN SATURATION: 98 % | SYSTOLIC BLOOD PRESSURE: 120 MMHG

## 2019-10-08 DIAGNOSIS — G31.84 MCI (MILD COGNITIVE IMPAIRMENT): Primary | ICD-10-CM

## 2019-10-08 NOTE — PROGRESS NOTES
NEUROLOGY follow-up appointment        Chief Complaint   Patient presents with    Follow-up     memory       Subjective  Rafi Parks is a 81 y.o. male who presented to the neurology office for memory problems. To recap, his memory problems started a few years ago and it has been gradually progressive. It does not interfere with his day-to-day activity but at times he is in the kitchen and he has to think twice where things are kept in the kitchen. He is very good with directions but recently takes him more effort to figure out the directions. He does not have any problems with names. He recently retired from work. He does not have any problems using his phone or a remote control. He or his wife has not noticed any changes in his personality. He is on Aricept 10 mg daily and has noticed improvement in memory. No new symptoms. He sleeps 5 hours and he does snore at night and the wife does state that he sometimes stops breathing at night. He did have a sleep study and it was negative for sleep apnea. Current Outpatient Medications   Medication Sig    donepezil (ARICEPT) 10 mg tablet TAKE 1 TABLET BY MOUTH EVERY EVENING    cholecalciferol, vitamin D3, (VITAMIN D3) 2,000 unit tab Take 500,000 Units by mouth. Taking 1 time a week    levothyroxine (SYNTHROID) 125 mcg tablet Take 75 mcg by mouth Daily (before breakfast).  atorvastatin (LIPITOR) 20 mg tablet Take 20 mg by mouth nightly. No current facility-administered medications for this visit.       REVIEW OF SYSTEMS:   A ten system review of constitutional, cardiovascular, respiratory, musculoskeletal, endocrine, skin, SHEENT, genitourinary, psychiatric and neurologic systems was obtained and is unremarkable with the exception of the following: Memory problems, hypothyroidism     EXAMINATION:   Visit Vitals  /75   Pulse 65   Ht 5' 9\" (1.753 m)   Wt 165 lb (74.8 kg)   SpO2 98%   BMI 24.37 kg/m²        General:   General appearance: Pt is in no acute distress   Distal pulses are preserved    Neurological Examination:   Mental Status: AAO x3. Speech is fluent. Follows commands, has normal fund of knowledge, attention, short term recall, comprehension and insight. Cranial Nerves: Visual fields are full. PERRL, Extraocular movements are full. Facial sensation intact V1- V3. Facial movement intact, symmetric. Hearing intact to conversation. Palate elevates symmetrically. Shoulder shrug symmetric. Tongue midline. Motor: Strength is 5/5 in all 4 ext. No atrophy. Tone: Normal    Sensation: Normal to light touch    Coordination/Cerebellar: Intact to finger-nose-finger     Gait: casual gait is normal.     Skin: No significant bruising or lacerations.     Mini Mental State Exam 10/8/2019   What is the Year 1   What is the Season 1   What is the Date 0   What is the Day 1   What is the Month 1   Where are we State 1   Where are we Country 1   Where are we [place] or [place] 1   Where are we Floor 1   Name three objects, then ask the patient to say them 3   Serial sevens Subtract 7 from 100 in increments 5   Ask for the three objects repeated above 1   Name a pencil 1   Name a watch 1   Have the patient repeat this phrase \"No ifs, ands, or buts\" 1   Three stage command: Take the paper in your right hand 1   Fold the paper in half 1   Put the paper on the floor 1   Read and obey the following: CLOSE YOUR EYES 1   Have the patient write a sentence 1   Have the patient copy a figure 1   Mini Mental Score 27   Some recent data might be hidden     Laboratory review:   Results for orders placed or performed during the hospital encounter of 06/16/18   CBC WITH AUTOMATED DIFF   Result Value Ref Range    WBC 13.8 (H) 4.1 - 11.1 K/uL    RBC 3.68 (L) 4.10 - 5.70 M/uL    HGB 12.4 12.1 - 17.0 g/dL    HCT 36.2 (L) 36.6 - 50.3 %    MCV 98.4 80.0 - 99.0 FL    MCH 33.7 26.0 - 34.0 PG    MCHC 34.3 30.0 - 36.5 g/dL    RDW 14.1 11.5 - 14.5 %    PLATELET 800 150 - 400 K/uL    MPV 10.2 8.9 - 12.9 FL    NRBC 0.0 0  WBC    ABSOLUTE NRBC 0.00 0.00 - 0.01 K/uL    NEUTROPHILS 86 (H) 32 - 75 %    LYMPHOCYTES 6 (L) 12 - 49 %    MONOCYTES 7 5 - 13 %    EOSINOPHILS 1 0 - 7 %    BASOPHILS 0 0 - 1 %    IMMATURE GRANULOCYTES 0 0.0 - 0.5 %    ABS. NEUTROPHILS 11.9 (H) 1.8 - 8.0 K/UL    ABS. LYMPHOCYTES 0.9 0.8 - 3.5 K/UL    ABS. MONOCYTES 0.9 0.0 - 1.0 K/UL    ABS. EOSINOPHILS 0.1 0.0 - 0.4 K/UL    ABS. BASOPHILS 0.0 0.0 - 0.1 K/UL    ABS. IMM. GRANS. 0.1 (H) 0.00 - 0.04 K/UL    DF AUTOMATED     METABOLIC PANEL, COMPREHENSIVE   Result Value Ref Range    Sodium 146 (H) 136 - 145 mmol/L    Potassium 3.6 3.5 - 5.1 mmol/L    Chloride 112 (H) 97 - 108 mmol/L    CO2 27 21 - 32 mmol/L    Anion gap 7 5 - 15 mmol/L    Glucose 95 65 - 100 mg/dL    BUN 14 6 - 20 MG/DL    Creatinine 1.38 (H) 0.70 - 1.30 MG/DL    BUN/Creatinine ratio 10 (L) 12 - 20      GFR est AA >60 >60 ml/min/1.73m2    GFR est non-AA 52 (L) >60 ml/min/1.73m2    Calcium 8.7 8.5 - 10.1 MG/DL    Bilirubin, total 0.7 0.2 - 1.0 MG/DL    ALT (SGPT) 22 12 - 78 U/L    AST (SGOT) 22 15 - 37 U/L    Alk. phosphatase 43 (L) 45 - 117 U/L    Protein, total 7.2 6.4 - 8.2 g/dL    Albumin 3.9 3.5 - 5.0 g/dL    Globulin 3.3 2.0 - 4.0 g/dL    A-G Ratio 1.2 1.1 - 2.2     TROPONIN I   Result Value Ref Range    Troponin-I, Qt. <0.05 <0.05 ng/mL   CK W/ REFLX CKMB   Result Value Ref Range     (H) 39 - 308 U/L   CK-MB,QUANT.    Result Value Ref Range    CK - MB 1.3 <3.6 NG/ML    CK-MB Index 0.4 0 - 2.5     EKG, 12 LEAD, INITIAL   Result Value Ref Range    Ventricular Rate 65 BPM    Atrial Rate 65 BPM    P-R Interval 178 ms    QRS Duration 80 ms    Q-T Interval 390 ms    QTC Calculation (Bezet) 405 ms    Calculated P Axis 44 degrees    Calculated R Axis 62 degrees    Calculated T Axis 39 degrees    Diagnosis       Normal sinus rhythm  Normal ECG  When compared with ECG of 17-DEC-2011 00:02,  No significant change was found  Confirmed by Chris Cleveland ([id number]) on 6/17/2018 3:39:29 PM         Imaging review:  12/4/2016  MRI of the brain without contrast  Normal  I personally reviewed the images in PACS and this is my impression. 2/24/2017  Sleep study  No evidence of significant sleep apnea. Snoring disorder. Documentation review:  I did review the notes from Dr. Mary Jane Campbell from 4/14/2017. The patient does have mixed normal/abnormal range neuropsychological testing. There is support for mild adjustment related depression and anxiety. Organic based mood issues would not explain the type of memory loss he is showing. He has marked stressors at home and maintains a very busy lifestyle but that would not explain this type of profile either. In my opinion, then, is that it is likely that he is transitioning into a dementia type process. This profile would be most consistent with mild cognitive impairment with memory loss. My recommendation would include consideration for medication for memory and consider also medication for attention if this is not medically contraindicated. Reversible causes of memory loss need to be fully ruled out such as possible sleep apnea and other medical issues. Follow-up in 6 months. Assessment/Plan:   Vishnu Baez is a 81 y.o. male who presented to the neurology office for management of mild cognitive impairment. His neuropsychological testing is positive for mild cognitive impairment. He is on Aricept 10 mg daily and has noticed improvement in his memory. His Mini-Mental status examination was 30 out of 30 during the last visit and today it is 27 out of 30. We will see the patient back in 6 months and if his Mini-Mental Status Examination is still low will add Namenda. Follow-up in 6 months    Over 25 minutes was spent with the patient and family of which > 50% of the visit was spent counseling on diagnosis, management, and treatment of the diagnosis.   I did discuss about mild cognitive impairment and risk for dementia. ICD-10-CM ICD-9-CM    1. MCI (mild cognitive impairment) G31.84 331.83         Gail Adler MD  Neurologist and Clinical Neurophysiologist    CC: Paras Aggarwal MD  Fax: [phone]    This note will not be viewable in [address]

## 2019-10-08 NOTE — PATIENT INSTRUCTIONS

## 2020-03-03 ENCOUNTER — OFFICE VISIT (OUTPATIENT)
Dept: NEUROLOGY | Age: 70
End: 2020-03-03

## 2020-03-03 VITALS
WEIGHT: 153 LBS | HEIGHT: 69 IN | OXYGEN SATURATION: 98 % | DIASTOLIC BLOOD PRESSURE: 70 MMHG | BODY MASS INDEX: 22.66 KG/M2 | SYSTOLIC BLOOD PRESSURE: 128 MMHG | HEART RATE: 89 BPM

## 2020-03-03 DIAGNOSIS — G31.84 MCI (MILD COGNITIVE IMPAIRMENT): Primary | ICD-10-CM

## 2020-03-03 RX ORDER — GLUCOSAM/CHONDRO/HERB 149/HYAL 750-100 MG
1 TABLET ORAL DAILY
COMMUNITY

## 2020-03-03 NOTE — PATIENT INSTRUCTIONS

## 2020-03-03 NOTE — LETTER
3/3/20 Patient: Lizz Knowles YOB: 1950 Date of Visit: 3/3/2020 Ashleigh Johnson MD 
[address] [place] [address].[place] [id number] VIA Facsimile: [phone] Dear Ashleigh Johnson MD, Thank you for referring Mr. Selena Reyes to [address] [place] [address] for evaluation. My notes for this consultation are attached. If you have questions, please do not hesitate to call me. I look forward to following your patient along with you. Sincerely, Margo Aponte MD

## 2020-03-03 NOTE — PROGRESS NOTES
NEUROLOGY follow-up appointment        Chief Complaint   Patient presents with    Follow-up    Memory Loss       Subjective  Nupur Ramirez is a 81 y.o. male who presented to the neurology office for memory problems. To recap, his memory problems started a few years ago and it has been gradually progressive. It does not interfere with his day-to-day activity but at times he is in the kitchen and he has to think twice where things are kept in the kitchen. He is very good with directions but recently takes him more effort to figure out the directions. He does not have any problems with names. He recently retired from work. He does not have any problems using his phone or a remote control. He or his wife has not noticed any changes in his personality. He is on Aricept 10 mg daily and has noticed improvement in memory. No new symptoms. He sleeps 5 hours and he does snore at night and the wife does state that he sometimes stops breathing at night. He did have a sleep study and it was negative for sleep apnea. Current Outpatient Medications   Medication Sig    omega 3-DHA-EPA-fish oil 1,000 mg (120 mg-180 mg) capsule Take 1 Cap by mouth daily.  donepezil (ARICEPT) 10 mg tablet TAKE 1 TABLET BY MOUTH EVERY EVENING    cholecalciferol, vitamin D3, (VITAMIN D3) 2,000 unit tab Take 500,000 Units by mouth. Taking 1 time a week    levothyroxine (SYNTHROID) 125 mcg tablet Take 75 mcg by mouth Daily (before breakfast).  atorvastatin (LIPITOR) 20 mg tablet Take 20 mg by mouth nightly. No current facility-administered medications for this visit.       REVIEW OF SYSTEMS:   A ten system review of constitutional, cardiovascular, respiratory, musculoskeletal, endocrine, skin, SHEENT, genitourinary, psychiatric and neurologic systems was obtained and is unremarkable with the exception of the following: Memory problems, hypothyroidism     EXAMINATION:   Visit Vitals  /70   Pulse 89   Ht 5' 9\" (1.753 m)   Wt 153 lb (69.4 kg)   SpO2 98%   BMI 22.59 kg/m²        General:   General appearance: Pt is in no acute distress   Distal pulses are preserved    Neurological Examination:   Mental Status: AAO x3. Speech is fluent. Follows commands, has normal fund of knowledge, attention, short term recall, comprehension and insight. Cranial Nerves: Visual fields are full. PERRL, Extraocular movements are full. Facial sensation intact V1- V3. Facial movement intact, symmetric. Hearing intact to conversation. Palate elevates symmetrically. Shoulder shrug symmetric. Tongue midline. Motor: Strength is 5/5 in all 4 ext. No atrophy. Tone: Normal    Sensation: Normal to light touch    Coordination/Cerebellar: Intact to finger-nose-finger     Gait: casual gait is normal.     Skin: No significant bruising or lacerations.     Mini Mental State Exam 3/3/2020   What is the Year 1   What is the Season 1   What is the Date 1   What is the Day 1   What is the Month 1   Where are we State 1   Where are we Country 1   Where are we [place] or [place] 1   Where are we Floor 1   Name three objects, then ask the patient to say them 3   Serial sevens Subtract 7 from 100 in increments 5   Ask for the three objects repeated above 3   Name a pencil 1   Name a watch 1   Have the patient repeat this phrase \"No ifs, ands, or buts\" 1   Three stage command: Take the paper in your right hand 1   Fold the paper in half 1   Put the paper on the floor 1   Read and obey the followin [employer] 1   Have the patient write a sentence 1   Have the patient copy a figure 1   Mini Mental Score 30   Some recent data might be hidden     Laboratory review:   Results for orders placed or performed during the hospital encounter of 18   CBC WITH AUTOMATED DIFF   Result Value Ref Range    WBC 13.8 (H) 4.1 - 11.1 K/uL    RBC 3.68 (L) 4.10 - 5.70 M/uL    HGB 12.4 12.1 - 17.0 g/dL    HCT 36.2 (L) 36.6 - 50.3 %    MCV 98.4 80.0 - 99.0 FL    MCH 33.7 26.0 - 34.0 PG    MCHC 34.3 30.0 - 36.5 g/dL    RDW 14.1 11.5 - 14.5 %    PLATELET 662 098 - 007 K/uL    MPV 10.2 8.9 - 12.9 FL    NRBC 0.0 0  WBC    ABSOLUTE NRBC 0.00 0.00 - 0.01 K/uL    NEUTROPHILS 86 (H) 32 - 75 %    LYMPHOCYTES 6 (L) 12 - 49 %    MONOCYTES 7 5 - 13 %    EOSINOPHILS 1 0 - 7 %    BASOPHILS 0 0 - 1 %    IMMATURE GRANULOCYTES 0 0.0 - 0.5 %    ABS. NEUTROPHILS 11.9 (H) 1.8 - 8.0 K/UL    ABS. LYMPHOCYTES 0.9 0.8 - 3.5 K/UL    ABS. MONOCYTES 0.9 0.0 - 1.0 K/UL    ABS. EOSINOPHILS 0.1 0.0 - 0.4 K/UL    ABS. BASOPHILS 0.0 0.0 - 0.1 K/UL    ABS. IMM. GRANS. 0.1 (H) 0.00 - 0.04 K/UL    DF AUTOMATED     METABOLIC PANEL, COMPREHENSIVE   Result Value Ref Range    Sodium 146 (H) 136 - 145 mmol/L    Potassium 3.6 3.5 - 5.1 mmol/L    Chloride 112 (H) 97 - 108 mmol/L    CO2 27 21 - 32 mmol/L    Anion gap 7 5 - 15 mmol/L    Glucose 95 65 - 100 mg/dL    BUN 14 6 - 20 MG/DL    Creatinine 1.38 (H) 0.70 - 1.30 MG/DL    BUN/Creatinine ratio 10 (L) 12 - 20      GFR est AA >60 >60 ml/min/1.73m2    GFR est non-AA 52 (L) >60 ml/min/1.73m2    Calcium 8.7 8.5 - 10.1 MG/DL    Bilirubin, total 0.7 0.2 - 1.0 MG/DL    ALT (SGPT) 22 12 - 78 U/L    AST (SGOT) 22 15 - 37 U/L    Alk. phosphatase 43 (L) 45 - 117 U/L    Protein, total 7.2 6.4 - 8.2 g/dL    Albumin 3.9 3.5 - 5.0 g/dL    Globulin 3.3 2.0 - 4.0 g/dL    A-G Ratio 1.2 1.1 - 2.2     TROPONIN I   Result Value Ref Range    Troponin-I, Qt. <0.05 <0.05 ng/mL   CK W/ REFLX CKMB   Result Value Ref Range     (H) 39 - 308 U/L   CK-MB,QUANT.    Result Value Ref Range    CK - MB 1.3 <3.6 NG/ML    CK-MB Index 0.4 0 - 2.5     EKG, 12 LEAD, INITIAL   Result Value Ref Range    Ventricular Rate 65 BPM    Atrial Rate 65 BPM    P-R Interval 178 ms    QRS Duration 80 ms    Q-T Interval 390 ms    QTC Calculation (Bezet) 405 ms    Calculated P Axis 44 degrees    Calculated R Axis 62 degrees    Calculated T Axis 39 degrees    Diagnosis       Normal sinus rhythm  Normal ECG  When compared with ECG of 17-DEC-2011 00:02,  No significant change was found  Confirmed by Berry Lemus ([id number]) on 6/17/2018 3:39:29 PM         Imaging review:  12/4/2016  MRI of the brain without contrast  Normal  I personally reviewed the images in PACS and this is my impression. 2/24/2017  Sleep study  No evidence of significant sleep apnea. Snoring disorder. Documentation review:  I did review the notes from Dr. Yumiko Sawant from 4/14/2017. The patient does have mixed normal/abnormal range neuropsychological testing. There is support for mild adjustment related depression and anxiety. Organic based mood issues would not explain the type of memory loss he is showing. He has marked stressors at home and maintains a very busy lifestyle but that would not explain this type of profile either. In my opinion, then, is that it is likely that he is transitioning into a dementia type process. This profile would be most consistent with mild cognitive impairment with memory loss. My recommendation would include consideration for medication for memory and consider also medication for attention if this is not medically contraindicated. Reversible causes of memory loss need to be fully ruled out such as possible sleep apnea and other medical issues. Follow-up in 6 months. Assessment/Plan:   Tashia Fink is a 81 y.o. male who presented to the neurology office for management of mild cognitive impairment. His neuropsychological testing is positive for mild cognitive impairment. He is on Aricept 10 mg daily and has noticed improvement in his memory. His MMSE during the last visit was 27 out of 30 and now has improved to 30 out of 30. We will continue with Aricept 10 mg every day. Follow-up in 6 months    Over 25 minutes was spent with the patient and family of which > 50% of the visit was spent counseling on diagnosis, management, and treatment of the diagnosis.   I did discuss about mild cognitive impairment and risk for dementia. ICD-10-CM ICD-9-CM    1. MCI (mild cognitive impairment) G31.84 331.83         Meño Flores MD  Neurologist and Clinical Neurophysiologist    CC: Ann Rodriguez MD  Fax: [phone]    This note will not be viewable in [address]

## 2020-09-14 ENCOUNTER — OFFICE VISIT (OUTPATIENT)
Dept: NEUROLOGY | Age: 70
End: 2020-09-14
Payer: MEDICARE

## 2020-09-14 VITALS
WEIGHT: 170 LBS | TEMPERATURE: 98.3 F | HEART RATE: 75 BPM | HEIGHT: 69 IN | DIASTOLIC BLOOD PRESSURE: 70 MMHG | SYSTOLIC BLOOD PRESSURE: 140 MMHG | BODY MASS INDEX: 25.18 KG/M2

## 2020-09-14 DIAGNOSIS — G31.84 MCI (MILD COGNITIVE IMPAIRMENT): Primary | ICD-10-CM

## 2020-09-14 PROCEDURE — G8432 DEP SCR NOT DOC, RNG: HCPCS | Performed by: PSYCHIATRY & NEUROLOGY

## 2020-09-14 PROCEDURE — G8536 NO DOC ELDER MAL SCRN: HCPCS | Performed by: PSYCHIATRY & NEUROLOGY

## 2020-09-14 PROCEDURE — G8428 CUR MEDS NOT DOCUMENT: HCPCS | Performed by: PSYCHIATRY & NEUROLOGY

## 2020-09-14 PROCEDURE — 1101F PT FALLS ASSESS-DOCD LE1/YR: CPT | Performed by: PSYCHIATRY & NEUROLOGY

## 2020-09-14 PROCEDURE — 99214 OFFICE O/P EST MOD 30 MIN: CPT | Performed by: PSYCHIATRY & NEUROLOGY

## 2020-09-14 PROCEDURE — G8419 CALC BMI OUT NRM PARAM NOF/U: HCPCS | Performed by: PSYCHIATRY & NEUROLOGY

## 2020-09-14 PROCEDURE — 3017F COLORECTAL CA SCREEN DOC REV: CPT | Performed by: PSYCHIATRY & NEUROLOGY

## 2020-09-14 NOTE — PATIENT INSTRUCTIONS

## 2020-09-14 NOTE — PROGRESS NOTES
NEUROLOGY follow-up appointment        Chief Complaint   Patient presents with    Follow-up     doing well with aricept    Memory Loss       Subjective  Fallon Yee is a 78 y.o. male who presented to the neurology office for memory problems. To recap, his memory problems started a few years ago and it has been gradually progressive. It does not interfere with his day-to-day activity but at times he is in the kitchen and he has to think twice where things are kept in the kitchen. He is very good with directions but recently takes him more effort to figure out the directions. He does not have any problems with names. He recently retired from work. He does not have any problems using his phone or a remote control. He or his wife has not noticed any changes in his personality. He is on Aricept 10 mg daily and has noticed improvement in memory. No new symptoms. He sleeps 5 hours and he does snore at night and the wife does state that he sometimes stops breathing at night. He did have a sleep study and it was negative for sleep apnea. Current Outpatient Medications   Medication Sig    COQ10, LIPOSOMAL UBIQUINOL, PO Take 20 mg by mouth.  omega 3-DHA-EPA-fish oil 1,000 mg (120 mg-180 mg) capsule Take 1 Cap by mouth daily.  donepezil (ARICEPT) 10 mg tablet TAKE 1 TABLET BY MOUTH EVERY EVENING    cholecalciferol, vitamin D3, (VITAMIN D3) 2,000 unit tab Take 500,000 Units by mouth. Taking 1 time a week    levothyroxine (SYNTHROID) 125 mcg tablet Take 75 mcg by mouth Daily (before breakfast).  atorvastatin (LIPITOR) 20 mg tablet Take 20 mg by mouth nightly. No current facility-administered medications for this visit.       REVIEW OF SYSTEMS:   A ten system review of constitutional, cardiovascular, respiratory, musculoskeletal, endocrine, skin, SHEENT, genitourinary, psychiatric and neurologic systems was obtained and is unremarkable with the exception of the following: Memory problems, hypothyroidism     EXAMINATION:   Visit Vitals  BP (!) 140/70   Pulse 75   Temp 98.3 °F (36.8 °C)   Ht 5' 9\" (1.753 m)   Wt 170 lb (77.1 kg)   BMI 25.10 kg/m²        General:   General appearance: Pt is in no acute distress   Distal pulses are preserved    Neurological Examination:   Mental Status: AAO x3. Speech is fluent. Follows commands, has normal fund of knowledge, attention, short term recall, comprehension and insight. Cranial Nerves: Visual fields are full. PERRL, Extraocular movements are full. Facial sensation intact V1- V3. Facial movement intact, symmetric. Hearing intact to conversation. Palate elevates symmetrically. Shoulder shrug symmetric. Tongue midline. Motor: Strength is 5/5 in all 4 ext. No atrophy. Tone: Normal    Sensation: Normal to light touch    Coordination/Cerebellar: Intact to finger-nose-finger     Gait: casual gait is normal.     Skin: No significant bruising or lacerations.     Mini Mental State Exam 2020   What is the Year 1   What is the Season 1   What is the Date 1   What is the Day 1   What is the Month 1   Where are we State 1   Where are we Country 1   Where are we [place] or [place] 1   Where are we Floor 0   Name three objects, then ask the patient to say them 3   Serial sevens Subtract 7 from 100 in increments 5   Ask for the three objects repeated above 3   Name a pencil 1   Name a watch 1   Have the patient repeat this phrase \"No ifs, ands, or buts\" 1   Three stage command: Take the paper in your right hand 1   Fold the paper in half 1   Put the paper on the floor 1   Read and obey the followin [employer] 1   Have the patient write a sentence 1   Have the patient copy a figure 1   Mini Mental Score 29   Some recent data might be hidden     Laboratory review:   Results for orders placed or performed during the hospital encounter of 18   CBC WITH AUTOMATED DIFF   Result Value Ref Range    WBC 13.8 (H) 4.1 - 11.1 K/uL    RBC 3.68 (L) 4.10 - 5.70 M/uL    HGB 12.4 12.1 - 17.0 g/dL    HCT 36.2 (L) 36.6 - 50.3 %    MCV 98.4 80.0 - 99.0 FL    MCH 33.7 26.0 - 34.0 PG    MCHC 34.3 30.0 - 36.5 g/dL    RDW 14.1 11.5 - 14.5 %    PLATELET 889 428 - 855 K/uL    MPV 10.2 8.9 - 12.9 FL    NRBC 0.0 0  WBC    ABSOLUTE NRBC 0.00 0.00 - 0.01 K/uL    NEUTROPHILS 86 (H) 32 - 75 %    LYMPHOCYTES 6 (L) 12 - 49 %    MONOCYTES 7 5 - 13 %    EOSINOPHILS 1 0 - 7 %    BASOPHILS 0 0 - 1 %    IMMATURE GRANULOCYTES 0 0.0 - 0.5 %    ABS. NEUTROPHILS 11.9 (H) 1.8 - 8.0 K/UL    ABS. LYMPHOCYTES 0.9 0.8 - 3.5 K/UL    ABS. MONOCYTES 0.9 0.0 - 1.0 K/UL    ABS. EOSINOPHILS 0.1 0.0 - 0.4 K/UL    ABS. BASOPHILS 0.0 0.0 - 0.1 K/UL    ABS. IMM. GRANS. 0.1 (H) 0.00 - 0.04 K/UL    DF AUTOMATED     METABOLIC PANEL, COMPREHENSIVE   Result Value Ref Range    Sodium 146 (H) 136 - 145 mmol/L    Potassium 3.6 3.5 - 5.1 mmol/L    Chloride 112 (H) 97 - 108 mmol/L    CO2 27 21 - 32 mmol/L    Anion gap 7 5 - 15 mmol/L    Glucose 95 65 - 100 mg/dL    BUN 14 6 - 20 MG/DL    Creatinine 1.38 (H) 0.70 - 1.30 MG/DL    BUN/Creatinine ratio 10 (L) 12 - 20      GFR est AA >60 >60 ml/min/1.73m2    GFR est non-AA 52 (L) >60 ml/min/1.73m2    Calcium 8.7 8.5 - 10.1 MG/DL    Bilirubin, total 0.7 0.2 - 1.0 MG/DL    ALT (SGPT) 22 12 - 78 U/L    AST (SGOT) 22 15 - 37 U/L    Alk. phosphatase 43 (L) 45 - 117 U/L    Protein, total 7.2 6.4 - 8.2 g/dL    Albumin 3.9 3.5 - 5.0 g/dL    Globulin 3.3 2.0 - 4.0 g/dL    A-G Ratio 1.2 1.1 - 2.2     TROPONIN I   Result Value Ref Range    Troponin-I, Qt. <0.05 <0.05 ng/mL   CK W/ REFLX CKMB   Result Value Ref Range     (H) 39 - 308 U/L   CK-MB,QUANT.    Result Value Ref Range    CK - MB 1.3 <3.6 NG/ML    CK-MB Index 0.4 0 - 2.5     EKG, 12 LEAD, INITIAL   Result Value Ref Range    Ventricular Rate 65 BPM    Atrial Rate 65 BPM    P-R Interval 178 ms    QRS Duration 80 ms    Q-T Interval 390 ms    QTC Calculation (Bezet) 405 ms    Calculated P Axis 44 degrees    Calculated R Axis 62 degrees    Calculated T Axis 39 degrees    Diagnosis       Normal sinus rhythm  Normal ECG  When compared with ECG of 17-DEC-2011 00:02,  No significant change was found  Confirmed by Tio Silveira ([id number]) on 6/17/2018 3:39:29 PM         Imaging review:  12/4/2016  MRI of the brain without contrast  Normal  I personally reviewed the images in PACS and this is my impression. 2/24/2017  Sleep study  No evidence of significant sleep apnea. Snoring disorder. Documentation review:  I did review the notes from Dr. Jewel Alcala from 4/14/2017. The patient does have mixed normal/abnormal range neuropsychological testing. There is support for mild adjustment related depression and anxiety. Organic based mood issues would not explain the type of memory loss he is showing. He has marked stressors at home and maintains a very busy lifestyle but that would not explain this type of profile either. In my opinion, then, is that it is likely that he is transitioning into a dementia type process. This profile would be most consistent with mild cognitive impairment with memory loss. My recommendation would include consideration for medication for memory and consider also medication for attention if this is not medically contraindicated. Reversible causes of memory loss need to be fully ruled out such as possible sleep apnea and other medical issues. Follow-up in 6 months. Assessment/Plan:   Claudia Mckeon is a 78 y.o. male who presented to the neurology office for management of mild cognitive impairment. His neuropsychological testing is positive for mild cognitive impairment. He is on Aricept 10 mg daily and has noticed improvement in his memory. His MMSE is 29 out of 30. There has been some lapses in concentration according to the face. But otherwise   doing pretty good.       Follow-up in 12 months    Over 25 minutes was spent with the patient and family of which > 50% of the visit was spent counseling on diagnosis, management, and treatment of the diagnosis. I did discuss about mild cognitive impairment and risk for dementia. ICD-10-CM ICD-9-CM    1. MCI (mild cognitive impairment)  G31.84 331.83         Galina Scott MD  Neurologist and Clinical Neurophysiologist    CC: Alfred Thomas MD  Fax: [phone]    This note will not be viewable in [address]

## 2020-09-27 DIAGNOSIS — G31.84 MCI (MILD COGNITIVE IMPAIRMENT): ICD-10-CM

## 2020-09-28 RX ORDER — DONEPEZIL HYDROCHLORIDE 10 MG/1
TABLET, FILM COATED ORAL
Qty: 90 TAB | Refills: 3 | Status: SHIPPED | OUTPATIENT
Start: 2020-09-28 | End: 2021-10-07 | Stop reason: SDUPTHER

## 2021-08-12 NOTE — TELEPHONE ENCOUNTER
Results of Sleep Testing, need for evaluation of snoring and follow-up discussed with patient. Patient encouraged to call if there were any further questions regarding sleep symptoms. Encounter Diagnosis   Name Primary?     Snoring Yes       Orders Placed This Encounter    REFERRAL TO ENT-OTOLARYNGOLOGY     Referral Priority:   Routine     Referral Type:   Consultation     Referral Reason:   Specialty Services Required     Referred to Provider:   Marlee Hernandez MD     Number of Visits Requested:   1 No

## 2021-09-16 ENCOUNTER — OFFICE VISIT (OUTPATIENT)
Dept: NEUROLOGY | Age: 71
End: 2021-09-16
Payer: MEDICARE

## 2021-09-16 VITALS
RESPIRATION RATE: 16 BRPM | DIASTOLIC BLOOD PRESSURE: 66 MMHG | SYSTOLIC BLOOD PRESSURE: 114 MMHG | OXYGEN SATURATION: 96 % | HEART RATE: 72 BPM

## 2021-09-16 DIAGNOSIS — G31.84 MCI (MILD COGNITIVE IMPAIRMENT): Primary | ICD-10-CM

## 2021-09-16 DIAGNOSIS — E03.9 ACQUIRED HYPOTHYROIDISM: ICD-10-CM

## 2021-09-16 DIAGNOSIS — F41.9 ANXIETY: ICD-10-CM

## 2021-09-16 PROCEDURE — G8536 NO DOC ELDER MAL SCRN: HCPCS | Performed by: PSYCHIATRY & NEUROLOGY

## 2021-09-16 PROCEDURE — 1101F PT FALLS ASSESS-DOCD LE1/YR: CPT | Performed by: PSYCHIATRY & NEUROLOGY

## 2021-09-16 PROCEDURE — G8427 DOCREV CUR MEDS BY ELIG CLIN: HCPCS | Performed by: PSYCHIATRY & NEUROLOGY

## 2021-09-16 PROCEDURE — G8421 BMI NOT CALCULATED: HCPCS | Performed by: PSYCHIATRY & NEUROLOGY

## 2021-09-16 PROCEDURE — 99215 OFFICE O/P EST HI 40 MIN: CPT | Performed by: PSYCHIATRY & NEUROLOGY

## 2021-09-16 PROCEDURE — 3017F COLORECTAL CA SCREEN DOC REV: CPT | Performed by: PSYCHIATRY & NEUROLOGY

## 2021-09-16 PROCEDURE — G8432 DEP SCR NOT DOC, RNG: HCPCS | Performed by: PSYCHIATRY & NEUROLOGY

## 2021-09-16 RX ORDER — BUSPIRONE HYDROCHLORIDE 7.5 MG/1
7.5 TABLET ORAL 2 TIMES DAILY
Qty: 60 TABLET | Refills: 3 | Status: SHIPPED | OUTPATIENT
Start: 2021-09-16 | End: 2021-11-30

## 2021-09-16 NOTE — PROGRESS NOTES
Follow-up Visit    Name Lynda Cooper Age 73 y.o. MRN [id number]  1950       Chief Complaint: memory loss    Patient has been seen 2016 for memory loss. He has been following Dr. Lobo Villarreal. He has been maintained on aricept. He seems to be slowly losing his memory. He feels that his memory has been on the decline. His wife feels that he sometimes is preoccupied with things that happened in the past. His wife is going to get a lung transplant and this weighs in on his stress. Imaging and neuropsych testing reviewed and discussed. Assesment and Plan  1. MCI (mild cognitive impairment)  Continue aricept    2. Anxiety  Will start on buspar    3. Acquired hypothyroidism  Continue synthroid      Allergies  Patient has no known allergies. Medications  Current Outpatient Medications   Medication Sig    donepeziL (ARICEPT) 10 mg tablet TAKE 1 TABLET BY MOUTH EVERY EVENING    COQ10, LIPOSOMAL UBIQUINOL, PO Take 20 mg by mouth.  omega 3-DHA-EPA-fish oil 1,000 mg (120 mg-180 mg) capsule Take 1 Cap by mouth daily.  cholecalciferol, vitamin D3, (VITAMIN D3) 2,000 unit tab Take 500,000 Units by mouth. Taking 1 time a week    levothyroxine (SYNTHROID) 125 mcg tablet Take 75 mcg by mouth Daily (before breakfast).  atorvastatin (LIPITOR) 20 mg tablet Take 20 mg by mouth nightly. No current facility-administered medications for this visit. Medical History  Past Medical History:   Diagnosis Date    Hypercholesteremia     Memory disorder     Thyroid disease     hypothyroid       Review of Systems   Eyes: Negative for blurred vision and double vision. Respiratory: Negative for cough and shortness of breath. Cardiovascular: Negative for chest pain, palpitations and orthopnea. Gastrointestinal: Negative for nausea and vomiting. Neurological: Negative for dizziness and headaches. Psychiatric/Behavioral: Positive for memory loss. Negative for depression.  The patient is nervous/anxious. Exam:  Visit Vitals  /66 (BP 1 Location: Left upper arm, BP Patient Position: Sitting, BP Cuff Size: Adult)   Pulse 72   Resp 16   SpO2 96%        General: Well developed, well nourished. Patient in no apparent distress   Head: Normocephalic, atraumatic, anicteric sclera   Neck Normal ROM, No thyromegally   Lungs:  Clear to auscultation    Cardiac: Regular rate and rhythm with no murmurs. Abd: Bowel sounds were audible. Ext: No pedal edema   Skin: Supple no rash     NeurologicExam:  Mental Status: Alert and oriented to person place and time   Speech: Fluent no aphasia or dysarthria. Cranial Nerves:  II - XII Intact   Motor:  Full and symmetric strength of upper and lower extremities. Normal bulk and tone. Reflexes:   Deep tendon reflexes 2+/4 and symmetric. Sensory:   Symmetric and intact with no perceived deficits . Gait:  Gait is balanced  with normal arm swing. Tremor:   No tremor noted. Cerebellar:  Coordination intact. Neurovascular: No carotid bruits.  No JVD          Lab Review  Lab Results   Component Value Date/Time    WBC 13.8 (H) 06/16/2018 08:21 PM    HCT 36.2 (L) 06/16/2018 08:21 PM    HGB 12.4 06/16/2018 08:21 PM    PLATELET 233 52/13/1535 08:21 PM       Lab Results   Component Value Date/Time    Sodium 146 (H) 06/16/2018 08:21 PM    Potassium 3.6 06/16/2018 08:21 PM    Chloride 112 (H) 06/16/2018 08:21 PM    CO2 27 06/16/2018 08:21 PM    Glucose 95 06/16/2018 08:21 PM    BUN 14 06/16/2018 08:21 PM    Creatinine 1.38 (H) 06/16/2018 08:21 PM    Calcium 8.7 06/16/2018 08:21 PM         Lab Results   Component Value Date/Time    Hemoglobin A1c 5.9 (H) 11/29/2016 04:29 PM        Lab Results   Component Value Date/Time    Vitamin B12 711 11/29/2016 04:29 PM

## 2021-10-07 DIAGNOSIS — G31.84 MCI (MILD COGNITIVE IMPAIRMENT): ICD-10-CM

## 2021-10-07 NOTE — TELEPHONE ENCOUNTER
Received a fax from [place] patient requesting a refill of Donepezil. Last office visit: 9/16/21    Last refill: 9/28/2020    Please review and fill if warranted.

## 2021-10-08 RX ORDER — DONEPEZIL HYDROCHLORIDE 10 MG/1
10 TABLET, FILM COATED ORAL EVERY EVENING
Qty: 90 TABLET | Refills: 3 | Status: SHIPPED | OUTPATIENT
Start: 2021-10-08 | End: 2022-01-31 | Stop reason: SDUPTHER

## 2021-11-23 ENCOUNTER — TELEPHONE (OUTPATIENT)
Dept: NEUROLOGY | Age: 71
End: 2021-11-23

## 2021-11-23 NOTE — TELEPHONE ENCOUNTER
Spoke with pt's wife, she would like a call back regarding the pt's Buspirone, and the issue that it was causing.

## 2021-11-24 ENCOUNTER — TELEPHONE (OUTPATIENT)
Dept: NEUROLOGY | Age: 71
End: 2021-11-24

## 2021-11-24 NOTE — TELEPHONE ENCOUNTER
Attempted to reach Clovis Miles. She is on patient HIPAA form. Left voicemail for her to give the office a call back.

## 2021-11-24 NOTE — TELEPHONE ENCOUNTER
----- Message from [facility] sent at 11/24/2021 11:02 AM EST -----  Regarding: /Telephone  General Message/Vendor Calls    Caller's first and last name: Burke Carrillo      Reason for call:      Callback required yes/no and why:y      Best contact number(s):[phone]      Details to clarify the request: Missed a call from Wilfrid Schilder in reference with .       [facility]

## 2021-11-26 ENCOUNTER — TELEPHONE (OUTPATIENT)
Dept: NEUROLOGY | Age: 71
End: 2021-11-26

## 2021-11-30 RX ORDER — DIVALPROEX SODIUM 250 MG/1
250 TABLET, DELAYED RELEASE ORAL 2 TIMES DAILY
Qty: 60 TABLET | Refills: 3 | Status: SHIPPED | OUTPATIENT
Start: 2021-11-30 | End: 2022-01-31

## 2021-12-17 NOTE — TELEPHONE ENCOUNTER
I called the patient's wife and notified her of below. She wants to simply stop the Buspar to see how he does. She has the Depakote on hand in case he needs to start it. She did question what it was for because she read that it was for seizures. I notified her that certain medications have multiple uses including this that can help irritation.     She will call back with an update in the next few weeks

## 2022-01-28 NOTE — PROGRESS NOTES
Neurology Note    Patient ID:  Laila Gustafson  [id number]  6 y.o.  1950      Date of Consultation:  January 31, 2022        Assessment and Plan:    The patient is a 71-year-old gentleman who returns to the clinic with the previous diagnosis of a early onset Alzheimer's dementia. His Adryan cognitive assessment score today was a 24 out of 30. This was my first encounter with the patient    Early onset Alzheimer's:  From talking with him and his wife, I do not see much in the way of a progression from what they told me. He will continue with Aricept 10 mg daily. I discussed with the patient and family members in regards to the patient's cognitive limitations and need to ensure patient safety. Attempts to minimize opportunities of harm is important. Activities to be monitored would include cooking, ironing clothes, financial bill payments. Having structure to a day is important for the patient. Cognitive and physical activities should be considered daily. Anxiety, stress, tension at home:  I did give them names and numbers of counseling therapist to discuss to think of nonpharmacological treatments to help           Subjective:my memory       History of Present Illness:   Laila Gustafson is a 73 y.o. male who returns to the neurology clinic at [facility] for an evaluation. He was last seen in the clinic in September 2021 by a former neurologist.  He has seen multiple neurologist for his neurological conditions and this is the first time I have met the patient. He was being followed for mild cognitive impairment and anxiety and was continued on Aricept. BuSpar was started at last visit but appears there was concerns over side effects of the medication and this was ultimately stopped. I was able to find a Mini-Mental Status Examination score from September 2020 which revealed a 29 out of 30. Since his last visit, his wife has noticed more short-term memory issues.   The patient himself does not feel that there has been any new concerns. She also does feel that he is a bit more irritable. She also feels that he talks a lot more. The patient states he is quite active and does exercise regularly. He goes for 30-minute walks. The patient also does volunteer at [employer] and does there financials in both he and his wife report that there has been no concerns with the financials over the past year. At his last visit, he was prescribed BuSpar for possible thoughts of anxiety but both he and his wife felt that it was not needed and stopped. They are interested in considering counseling. He does try to watch what he eats. He gets 8 hours of sleep a night    Past Medical History:   Diagnosis Date    Hypercholesteremia     Memory disorder     Thyroid disease     hypothyroid        Past Surgical History:   Procedure Laterality Date    COLONOSCOPY N/A 11/15/2018    COLONOSCOPY performed by Nanine Sandifer, MD at [facility] ENDOSCOPY        Family History   Problem Relation Age of Onset   Ebony Spurling Cancer Mother     No Known Problems Father         Social History     Tobacco Use    Smoking status: Never Smoker    Smokeless tobacco: Never Used   Substance Use Topics    Alcohol use: No        No Known Allergies     Prior to Admission medications    Medication Sig Start Date End Date Taking? Authorizing Provider   donepeziL (ARICEPT) 10 mg tablet Take 1 Tablet by mouth every evening. 1/31/22  Yes Marshal Mon,    divalproex DR (DEPAKOTE) 250 mg tablet Take 1 Tablet by mouth two (2) times a day. 11/30/21  Yes Stephan Guerrero MD   COQ10, LIPOSOMAL UBIQUINOL, PO Take 20 mg by mouth. Yes Provider, Historical   omega 3-DHA-EPA-fish oil 1,000 mg (120 mg-180 mg) capsule Take 1 Cap by mouth daily. Yes Provider, Historical   cholecalciferol, vitamin D3, (VITAMIN D3) 2,000 unit tab Take 500,000 Units by mouth.  Taking 1 time a week   Yes Provider, Historical   levothyroxine (SYNTHROID) 125 mcg tablet Take 75 mcg by mouth Daily (before breakfast). Yes Glenroy, MD [place]   atorvastatin (LIPITOR) 20 mg tablet Take 20 mg by mouth nightly. Yes Other, MD [place]       Review of Systems:    General, constitutional: negative  Eyes, vision: negative  Ears, nose, throat: negative  Cardiovascular, heart: negative  Respiratory: negative  Gastrointestinal: negative  Genitourinary: negative  Musculoskeletal: negative  Skin and integumentary: negative  Psychiatric: negative  Endocrine: negative  Neurological: negative, except for HPI  Hematologic/lymphatic: negative  Allergy/immunology: negative      Objective:     Visit Vitals  /80 (BP 1 Location: Left arm, BP Patient Position: Sitting, BP Cuff Size: Adult)   Pulse 77   Resp 16   Ht 5' 9\" (1.753 m)   Wt 170 lb (77.1 kg)   SpO2 98%   BMI 25.10 kg/m²       Physical Exam:  General:  appears well nourished in no acute distress  Neck: no carotid bruits  Lungs: clear to auscultation  Heart:  no murmurs, regular rate  Lower extremity: peripheral pulses palpable and no edema  Skin: intact    Neurological exam:    Awake, alert, oriented to person, place and time  The patient does better with remote memory than recent memory. He has decreased attention and concentration. He has reduced registration and recall. Language was intact. There was no aphasia  Speech: no dysarthria  MoCA 24/30  Cranial nerves:   II-XII were tested    Perrrla  Visual fields were full  Eomi, no evidence of nystagmus  Facial sensation:  normal and symmetric  Facial motor: normal and symmetric  Hearing intact  SCM strength intact  Tongue: midline without fasciculations    Motor: Tone normal    No evidence of fasciculations    Strength testing:   deltoid triceps biceps Wrist ext. Wrist flex. intrinsics Hip flex. Hip ext. Knee ext.   Knee flex Dorsi flex Plantar flex   Right 5 5 5 5 5 5 5 5 5 5 5 5   Left 5 5 5 5 5 5 5 5 5 5 5 5         Sensory:  Upper extremity: intact to pp  Lower extremity: intact to pp    Reflexes:    Right Left  Biceps  2 2  Triceps 2 2  Brachiorad. 2 2  Patella  2 2  Achilles 2 2    Cerebellar testing:  no tremor apparent, finger/nose and delma were intact    Romberg: absent    Gait: steady. Labs:     Lab Results   Component Value Date/Time    Hemoglobin A1c 5.9 (H) 11/29/2016 04:29 PM    Sodium 146 (H) 06/16/2018 08:21 PM    Potassium 3.6 06/16/2018 08:21 PM    Chloride 112 (H) 06/16/2018 08:21 PM    Glucose 95 06/16/2018 08:21 PM    BUN 14 06/16/2018 08:21 PM    Creatinine 1.38 (H) 06/16/2018 08:21 PM    Calcium 8.7 06/16/2018 08:21 PM    WBC 13.8 (H) 06/16/2018 08:21 PM    HCT 36.2 (L) 06/16/2018 08:21 PM    HGB 12.4 06/16/2018 08:21 PM    PLATELET 780 67/73/0575 08:21 PM       Imaging:    Results from Hospital Encounter encounter on 12/04/16    MRI BRAIN WO CONT    Narrative  CLINICAL HISTORY: Memory loss    INDICATION: Memory loss    COMPARISON: 12/17/2011      TECHNIQUE: MR examination of the brain includes axial and sagittal T1 , axial  T2, axial FLAIR, axial gradient echo, axial DWI, coronal T1 . Contrast: None  FINDINGS:    There is no Chiari or syrinx. Pituitary and infundibulum are grossly  unremarkable. Cerebellopontine angles are grossly unremarkable. The major  intracranial vascular flow-voids are unremarkable as well. Orbits are symmetric. No significant sulcal and ventricular enlargement. The brain architecture is  normal. There is no evidence of midline shift or mass-effect. The ventricles are  normal in size, position and configuration. There are no extra-axial fluid  collections. Major intracranial vascular flow-voids unremarkable. Impression  IMPRESSION:  Normal MRI of the brain. There is no intracranial mass, hemorrhage, acute infarction or evidence of  cerebral atrophy. Results from [place] encounter on 12/16/11    CT HEAD WITHOUT CONTRAST    Narrative  **Final Report**      ICD Codes / Adm. Diagnosis: [id number]   / Syncope  Examination:  CT HEAD  CON  - [id number] - Dec 17 2011  1:44AM  Accession No:  [id number]  Reason:  Pain      REPORT:  INDICATION: Pain    Exam: Noncontrast CT of the brain is performed with 5 mm collimation. FINDINGS: There is no acute intracranial hemorrhage, mass, mass effect or  herniation. Ventricular system is normal. The gray-white matter  differentiation is well-preserved. The mastoid air cells are well  pneumatized. The visualized paranasal sinuses are normal.      IMPRESSION: No acute intracranial hemorrhage, mass or infarct. Signing/Reading Doctor: ADRIANNE Sahu ([id number])  Approved: ADRIANNE NUNEZ ([id number])  12/17/2011             There is no problem list on file for this patient. The patient should return to clinic in 6-9 months    Renewed medication: yes    I spent  35  minutes on the day of the encounter preparing the office visit by reviewing medical records, obtaining a history, performing examination, counseling and educating the patient and family members on diagnosis, ordering medications, documenting in the clinical medical record, and coordinating the care for the patient. The patient had the ability to ask questions and all questions were answered.                  Signed By:  Maryam Fitzpatrick DO FAAN    January 31, 2022

## 2022-01-31 ENCOUNTER — OFFICE VISIT (OUTPATIENT)
Dept: NEUROLOGY | Age: 72
End: 2022-01-31
Payer: MEDICARE

## 2022-01-31 VITALS
RESPIRATION RATE: 16 BRPM | BODY MASS INDEX: 25.18 KG/M2 | HEIGHT: 69 IN | DIASTOLIC BLOOD PRESSURE: 80 MMHG | HEART RATE: 77 BPM | OXYGEN SATURATION: 98 % | SYSTOLIC BLOOD PRESSURE: 130 MMHG | WEIGHT: 170 LBS

## 2022-01-31 DIAGNOSIS — G31.84 MCI (MILD COGNITIVE IMPAIRMENT): Primary | ICD-10-CM

## 2022-01-31 DIAGNOSIS — F03.90 DEMENTIA WITHOUT BEHAVIORAL DISTURBANCE, UNSPECIFIED DEMENTIA TYPE: ICD-10-CM

## 2022-01-31 PROCEDURE — 99214 OFFICE O/P EST MOD 30 MIN: CPT | Performed by: PSYCHIATRY & NEUROLOGY

## 2022-01-31 PROCEDURE — G8536 NO DOC ELDER MAL SCRN: HCPCS | Performed by: PSYCHIATRY & NEUROLOGY

## 2022-01-31 PROCEDURE — G8427 DOCREV CUR MEDS BY ELIG CLIN: HCPCS | Performed by: PSYCHIATRY & NEUROLOGY

## 2022-01-31 PROCEDURE — 3017F COLORECTAL CA SCREEN DOC REV: CPT | Performed by: PSYCHIATRY & NEUROLOGY

## 2022-01-31 PROCEDURE — G8510 SCR DEP NEG, NO PLAN REQD: HCPCS | Performed by: PSYCHIATRY & NEUROLOGY

## 2022-01-31 PROCEDURE — G8419 CALC BMI OUT NRM PARAM NOF/U: HCPCS | Performed by: PSYCHIATRY & NEUROLOGY

## 2022-01-31 PROCEDURE — 1101F PT FALLS ASSESS-DOCD LE1/YR: CPT | Performed by: PSYCHIATRY & NEUROLOGY

## 2022-01-31 RX ORDER — DONEPEZIL HYDROCHLORIDE 10 MG/1
10 TABLET, FILM COATED ORAL EVERY EVENING
Qty: 90 TABLET | Refills: 3 | Status: SHIPPED | OUTPATIENT
Start: 2022-01-31 | End: 2022-08-08

## 2022-01-31 NOTE — LETTER
1/31/2022    Patient: Benita Codding   YOB: 1950   Date of Visit: 1/31/2022     Nathaly Krishnan MD  [facility] [address] [address]  Via Fax: [phone]    Dear Nathaly Krishnan MD,      Thank you for referring Mr. Kurt Coffey to [address] [place] [address] for evaluation. My notes for this consultation are attached. If you have questions, please do not hesitate to call me. I look forward to following your patient along with you.       Sincerely,    aMrshal Mon, DO

## 2022-08-05 NOTE — PROGRESS NOTES
Neurology Note    Patient ID:  Pam Schreiber  [id number]  81 y.o.  1950      Date of Consultation:  August 8, 2022        Assessment and Plan:    The patient is a 81year-old gentleman who returns to the clinic with the previous diagnosis of a early onset Alzheimer's dementia. His Adryan cognitive assessment score today was a 20/ 30    Alzheimer's:  From talking with him and his wife, there is continued progression of his dementia. He had been maintained on Aricept 10 mg daily. Due to progression of disease, memantine should be added. For ease, safety I will prescribe the combination Namzaric. Side effects and toxicity were reviewed with the patient and his wife. I discussed with the patient and his wife in regards to the patient's cognitive limitations and need to ensure patient safety. Attempts to minimize opportunities of harm is important. Activities to be monitored would include cooking, ironing clothes, financial bill payments. Having structure to a day is important for the patient. Cognitive and physical activities should be considered daily. Subjective:my memory       History of Present Illness:   Pam Schreiber is a 81 y.o. male who returns to the neurology clinic at [facility] for an evaluation. I first met the patient on January 31, 2022. Please see my history of present illness, examination, and treatment plan from that day. He was being seen for his dementia. He was seen by multiple prior neurologist before my initial encounter with him. He does present with his wife today who provides additional information. There has been continued worsening of his short-term memory. His wife takes care of the finances at home. He still does some of the financials at [employer] but his wife watches behind him and ensures everything is done correctly  Patient does exercise regularly. He does not do much in the way of cognitively stimulating activity.     He is forgetting names and numbers much more than previously. He still does occasionally play the piano at [employer]        Past Medical History:   Diagnosis Date    Hypercholesteremia     Memory disorder     Thyroid disease     hypothyroid        Past Surgical History:   Procedure Laterality Date    COLONOSCOPY N/A 11/15/2018    COLONOSCOPY performed by Bruce Tan MD at [facility] ENDOSCOPY        Family History   Problem Relation Age of Onset    Cancer Mother     No Known Problems Father         Social History     Tobacco Use    Smoking status: Never    Smokeless tobacco: Never   Substance Use Topics    Alcohol use: No        No Known Allergies     Prior to Admission medications    Medication Sig Start Date End Date Taking? Authorizing Provider   ergocalciferol (ERGOCALCIFEROL) 1,250 mcg (50,000 unit) capsule TAKE 1 CAPSULE BY MOUTH 1 TIME A WEEK 5/14/22  Yes Provider, Historical   rosuvastatin (CRESTOR) 10 mg tablet Take 10 mg by mouth in the morning. 6/27/22  Yes Provider, Historical   donepeziL (ARICEPT) 10 mg tablet Take 1 Tablet by mouth every evening. 1/31/22  Yes Marshal Mon,    COQ10, LIPOSOMAL UBIQUINOL, PO Take 20 mg by mouth. Yes Provider, Historical   omega 3-DHA-EPA-fish oil 1,000 mg (120 mg-180 mg) capsule Take 1 Cap by mouth daily. Yes Provider, Historical   cholecalciferol, vitamin D3, 50 mcg (2,000 unit) tab Take 500,000 Units by mouth. Taking 1 time a week   Yes Provider, Historical   levothyroxine (SYNTHROID) 125 mcg tablet Take 75 mcg by mouth Daily (before breakfast). Yes Glenroy, MD [place]   atorvastatin (LIPITOR) 20 mg tablet Take 20 mg by mouth nightly.   Patient not taking: Reported on 8/8/2022    Other, MD Fortino       Review of Systems:    General, constitutional: negative  Eyes, vision: negative  Ears, nose, throat: negative  Cardiovascular, heart: negative  Respiratory: negative  Gastrointestinal: negative  Genitourinary: negative  Musculoskeletal: negative  Skin and integumentary: negative  Psychiatric: negative  Endocrine: negative  Neurological: negative, except for HPI  Hematologic/lymphatic: negative  Allergy/immunology: negative      Objective:     Visit Vitals  /78 (BP 1 Location: Left arm, BP Patient Position: Sitting, BP Cuff Size: Adult)   Pulse 83   Resp 16   Ht 5' 9\" (1.753 m)   Wt 170 lb 6.4 oz (77.3 kg)   SpO2 96%   BMI 25.16 kg/m²         Physical Exam:  General:  appears well nourished in no acute distress  Neck: no carotid bruits  Lungs: clear to auscultation  Heart:  no murmurs, regular rate  Lower extremity: peripheral pulses palpable and no edema  Skin: intact    Neurological exam:    Awake, alert, oriented to person, place and time  The patient does better with remote memory than recent memory. He has decreased attention and concentration. He has reduced registration and recall. He could remember 0 out of 5 objects at 5 minutes. He can only name 3 words a begin with a T. He had some difficulties with similarities and differences. He could perform simple calculations. Language was intact. There was no aphasia  Speech: no dysarthria  MoCA 20/30  Cranial nerves:   II-XII were tested    Perrrla  Visual fields were full  Eomi, no evidence of nystagmus  Facial sensation:  normal and symmetric  Facial motor: normal and symmetric  Hearing intact  SCM strength intact  Tongue: midline without fasciculations    Motor: Tone normal    No evidence of fasciculations    Strength testing:   deltoid triceps biceps Wrist ext. Wrist flex. intrinsics Hip flex. Hip ext. Knee ext. Knee flex Dorsi flex Plantar flex   Right 5 5 5 5 5 5 5 5 5 5 5 5   Left 5 5 5 5 5 5 5 5 5 5 5 5         Sensory:  Upper extremity: intact to pp  Lower extremity: intact to pp    Reflexes:    Right Left  Biceps  2 2  Triceps 2 2  Brachiorad. 2 2  Patella  2 2  Achilles 2 2    Cerebellar testing:  no tremor apparent, finger/nose and delma were intact    Romberg: absent    Gait: steady.      Labs:     Lab Results   Component Value Date/Time    Hemoglobin A1c 5.9 (H) 11/29/2016 04:29 PM    Sodium 146 (H) 06/16/2018 08:21 PM    Potassium 3.6 06/16/2018 08:21 PM    Chloride 112 (H) 06/16/2018 08:21 PM    Glucose 95 06/16/2018 08:21 PM    BUN 14 06/16/2018 08:21 PM    Creatinine 1.38 (H) 06/16/2018 08:21 PM    Calcium 8.7 06/16/2018 08:21 PM    WBC 13.8 (H) 06/16/2018 08:21 PM    HCT 36.2 (L) 06/16/2018 08:21 PM    HGB 12.4 06/16/2018 08:21 PM    PLATELET 650 68/12/7370 08:21 PM       Imaging:    Results from Hospital Encounter encounter on 12/04/16    MRI BRAIN WO CONT    Narrative  CLINICAL HISTORY: Memory loss    INDICATION: Memory loss    COMPARISON: 12/17/2011      TECHNIQUE: MR examination of the brain includes axial and sagittal T1 , axial  T2, axial FLAIR, axial gradient echo, axial DWI, coronal T1 . Contrast: None  FINDINGS:    There is no Chiari or syrinx. Pituitary and infundibulum are grossly  unremarkable. Cerebellopontine angles are grossly unremarkable. The major  intracranial vascular flow-voids are unremarkable as well. Orbits are symmetric. No significant sulcal and ventricular enlargement. The brain architecture is  normal. There is no evidence of midline shift or mass-effect. The ventricles are  normal in size, position and configuration. There are no extra-axial fluid  collections. Major intracranial vascular flow-voids unremarkable. Impression  IMPRESSION:  Normal MRI of the brain. There is no intracranial mass, hemorrhage, acute infarction or evidence of  cerebral atrophy. Results from [place] encounter on 12/16/11    CT HEAD WITHOUT CONTRAST    Narrative  **Final Report**      ICD Codes / Adm. Diagnosis: [id number]   / Syncope  Examination:  CT HEAD WO CON  - [id number] - Dec 17 2011  1:44AM  Accession No:  [id number]  Reason:  Pain      REPORT:  INDICATION: Pain    Exam: Noncontrast CT of the brain is performed with 5 mm collimation.     FINDINGS: There is no acute intracranial hemorrhage, mass, mass effect or  herniation. Ventricular system is normal. The gray-white matter  differentiation is well-preserved. The mastoid air cells are well  pneumatized. The visualized paranasal sinuses are normal.      IMPRESSION: No acute intracranial hemorrhage, mass or infarct. Signing/Reading Doctor: ADRIANNE Palomino ([id number])  Approved: ADRIANNE NUNEZ ([id number])  12/17/2011             There is no problem list on file for this patient.      The patient should return to clinic in 6-9 months    Renewed medication: yes            Signed By:  Lori York DO FAAN    August 8, 2022

## 2022-08-08 ENCOUNTER — OFFICE VISIT (OUTPATIENT)
Dept: NEUROLOGY | Age: 72
End: 2022-08-08
Payer: MEDICARE

## 2022-08-08 VITALS
RESPIRATION RATE: 16 BRPM | BODY MASS INDEX: 25.24 KG/M2 | WEIGHT: 170.4 LBS | HEIGHT: 69 IN | OXYGEN SATURATION: 96 % | DIASTOLIC BLOOD PRESSURE: 78 MMHG | HEART RATE: 83 BPM | SYSTOLIC BLOOD PRESSURE: 120 MMHG

## 2022-08-08 DIAGNOSIS — F03.90 DEMENTIA WITHOUT BEHAVIORAL DISTURBANCE, UNSPECIFIED DEMENTIA TYPE: Primary | ICD-10-CM

## 2022-08-08 PROCEDURE — 1123F ACP DISCUSS/DSCN MKR DOCD: CPT | Performed by: PSYCHIATRY & NEUROLOGY

## 2022-08-08 PROCEDURE — G8510 SCR DEP NEG, NO PLAN REQD: HCPCS | Performed by: PSYCHIATRY & NEUROLOGY

## 2022-08-08 PROCEDURE — 3017F COLORECTAL CA SCREEN DOC REV: CPT | Performed by: PSYCHIATRY & NEUROLOGY

## 2022-08-08 PROCEDURE — 99214 OFFICE O/P EST MOD 30 MIN: CPT | Performed by: PSYCHIATRY & NEUROLOGY

## 2022-08-08 PROCEDURE — 1101F PT FALLS ASSESS-DOCD LE1/YR: CPT | Performed by: PSYCHIATRY & NEUROLOGY

## 2022-08-08 PROCEDURE — G8427 DOCREV CUR MEDS BY ELIG CLIN: HCPCS | Performed by: PSYCHIATRY & NEUROLOGY

## 2022-08-08 PROCEDURE — G8417 CALC BMI ABV UP PARAM F/U: HCPCS | Performed by: PSYCHIATRY & NEUROLOGY

## 2022-08-08 PROCEDURE — G8536 NO DOC ELDER MAL SCRN: HCPCS | Performed by: PSYCHIATRY & NEUROLOGY

## 2022-08-08 RX ORDER — MEMANTINE HYDROCHLORIDE AND DONEPEZIL HYDROCHLORIDE 7; 10 MG/1; MG/1
1 CAPSULE ORAL DAILY
Qty: 30 EACH | Refills: 5 | Status: SHIPPED | OUTPATIENT
Start: 2022-08-08

## 2022-08-08 RX ORDER — ROSUVASTATIN CALCIUM 10 MG/1
10 TABLET, COATED ORAL DAILY
COMMUNITY
Start: 2022-06-27

## 2022-08-08 RX ORDER — ERGOCALCIFEROL 1.25 MG/1
CAPSULE ORAL
COMMUNITY
Start: 2022-05-14

## 2022-08-09 ENCOUNTER — TELEPHONE (OUTPATIENT)
Dept: NEUROLOGY | Age: 72
End: 2022-08-09

## 2022-08-10 DIAGNOSIS — G31.84 MCI (MILD COGNITIVE IMPAIRMENT): ICD-10-CM

## 2022-08-10 RX ORDER — MEMANTINE HYDROCHLORIDE 10 MG/1
10 TABLET ORAL DAILY
Qty: 90 TABLET | Refills: 3 | Status: SHIPPED | OUTPATIENT
Start: 2022-08-10

## 2022-08-10 RX ORDER — DONEPEZIL HYDROCHLORIDE 10 MG/1
10 TABLET, FILM COATED ORAL EVERY EVENING
Qty: 90 TABLET | Refills: 3 | Status: SHIPPED | OUTPATIENT
Start: 2022-08-10

## 2022-08-10 NOTE — TELEPHONE ENCOUNTER
Called and spoke with Romina Loera. HIPAA verified. Verified patient name/. Notified of prescriptions being sent in. She verbalized understanding.

## 2023-03-17 ENCOUNTER — OFFICE VISIT (OUTPATIENT)
Dept: NEUROLOGY | Age: 73
End: 2023-03-17

## 2023-03-17 VITALS
HEART RATE: 84 BPM | RESPIRATION RATE: 16 BRPM | SYSTOLIC BLOOD PRESSURE: 144 MMHG | BODY MASS INDEX: 27.22 KG/M2 | HEIGHT: 69 IN | WEIGHT: 183.8 LBS | DIASTOLIC BLOOD PRESSURE: 86 MMHG | TEMPERATURE: 98.2 F | OXYGEN SATURATION: 99 %

## 2023-03-17 DIAGNOSIS — F41.9 ANXIETY: ICD-10-CM

## 2023-03-17 DIAGNOSIS — G30.9 DEMENTIA OF THE ALZHEIMER'S TYPE WITHOUT BEHAVIORAL DISTURBANCE (HCC): Primary | ICD-10-CM

## 2023-03-17 DIAGNOSIS — F02.80 DEMENTIA OF THE ALZHEIMER'S TYPE WITHOUT BEHAVIORAL DISTURBANCE (HCC): Primary | ICD-10-CM

## 2023-03-17 RX ORDER — MEMANTINE HYDROCHLORIDE 10 MG/1
10 TABLET ORAL 2 TIMES DAILY
Qty: 60 TABLET | Refills: 5 | Status: SHIPPED | OUTPATIENT
Start: 2023-03-17

## 2023-03-17 NOTE — PROGRESS NOTES
Chief Complaint   Patient presents with    Follow-up     early onset Alzheimer's dementia     1. Have you been to the ER, urgent care clinic since your last visit? No Hospitalized since your last visit? No     2. Have you seen or consulted any other health care providers outside of the [address] [place] [address] since your last visit? Yes Dentist & eye exam Include any pap smears or colon screening. NO     Patient family is concerned with agitation due to spouse been in hospital since 10/22 with double visit transplant.

## 2023-03-17 NOTE — PROGRESS NOTES
[facility]  [address] [facility] [address] [place] [address]  [place], [address]allos  Tel: [phone]  Fax: [phone]      Date:  23     Name:  Shanique Mejia  :  1950  MRN:  [id number]     PCP:  Lacho Villarreal NP    Chief Complaint   Patient presents with    Follow-up     early onset Alzheimer's dementia       HISTORY OF PRESENT ILLNESS:  Patient presents today for regular follow up, last seen 2022 with Dr Iris Hargrove. Here today with his daugther and niece. His wife has been in the ICU for several months, so it has been rather stressful for the patient. Overall the family has noticed a decline in his cognition, the patient has a hard time following conversation. General confusion, worsened by anxiety, some agitated. There are times, his parents have dementia as well that it gets worse when he is trying to compensate with his parents. His drivers license is suspended, in Nov/Dec, ended up driving several hours away. Still does some driving despite having a suspended license. Goes to the grocery store. Family has concerns about this. Living with his parents, niece comes by regularly, she takes them to appointments, helps with housecleaning , laundry,she does the cooking. During the day he enjoys watching tv, gets out with his niece, no regular exercise. Aricept 10mg daily. He is taking this as prescribed, no side effects. Namenda  10mg: He is taking this once a day, no side effects noted. Recap from last visit    Alzheimer's:  From talking with him and his wife, there is continued progression of his dementia. He had been maintained on Aricept 10 mg daily. Due to progression of disease, memantine should be added. For ease, safety I will prescribe the combination Namzaric. Side effects and toxicity were reviewed with the patient and his wife. I discussed with the patient and his wife in regards to the patient's cognitive limitations and need to ensure patient safety. Attempts to minimize opportunities of harm is important. Activities to be monitored would include cooking, ironing clothes, financial bill payments. Having structure to a day is important for the patient. Cognitive and physical activities should be considered daily. REVIEW OF SYSTEMS:     Review of Systems   Gastrointestinal:         Eating ok, no trouble swallowing. Drinks coffee in the morning, then bottle of water with meds, lemonade, could probably drink more water. Musculoskeletal: Negative. Negative for falls. Neurological:  Negative for dizziness and headaches. Psychiatric/Behavioral:  Positive for memory loss. Negative for hallucinations. The patient is nervous/anxious. The patient does not have insomnia. All other systems reviewed and are negative. Current Outpatient Medications   Medication Sig    memantine (Namenda) 10 mg tablet Take 1 Tablet by mouth two (2) times a day. donepeziL (ARICEPT) 10 mg tablet Take 1 Tablet by mouth every evening.    ergocalciferol (ERGOCALCIFEROL) 1,250 mcg (50,000 unit) capsule TAKE 1 CAPSULE BY MOUTH 1 TIME A WEEK    rosuvastatin (CRESTOR) 10 mg tablet Take 10 mg by mouth in the morning. COQ10, LIPOSOMAL UBIQUINOL, PO Take 20 mg by mouth. omega 3-DHA-EPA-fish oil 1,000 mg (120 mg-180 mg) capsule Take 1 Capsule by mouth as needed. levothyroxine (SYNTHROID) 125 mcg tablet Take 75 mcg by mouth Daily (before breakfast). No current facility-administered medications for this visit.    6  No Known Allergies  Past Medical History:   Diagnosis Date    Hypercholesteremia     Memory disorder     Thyroid disease     hypothyroid     Past Surgical History:   Procedure Laterality Date    COLONOSCOPY N/A 11/15/2018    COLONOSCOPY performed by Elijah Aranda MD at [facility] ENDOSCOPY     Social History     Socioeconomic History    Marital status:      Spouse name: Not on file    Number of children: Not on file    Years of education: Not on file    Highest education level: Not on file   Occupational History    Not on file   Tobacco Use    Smoking status: Never    Smokeless tobacco: Never   Vaping Use    Vaping Use: Never used   Substance and Sexual Activity    Alcohol use: No    Drug use: No    Sexual activity: Not Currently   Other Topics Concern    Not on file   Social History Narrative    Not on file     Social Determinants of Health     Financial Resource Strain: Not on file   Food Insecurity: Not on file   Transportation Needs: Not on file   Physical Activity: Not on file   Stress: Not on file   Social Connections: Not on file   Intimate Partner Violence: Not on file   Housing Stability: Not on file     Family History   Problem Relation Age of Onset    Cancer Mother     No Known Problems Father          PHYSICAL EXAMINATION:    Visit Vitals  BP (!) 144/86 (BP 1 Location: Left upper arm, BP Patient Position: Sitting, BP Cuff Size: Large adult)   Pulse 84   Temp 98.2 °F (36.8 °C) (Temporal)   Resp 16   Ht 5' 9\" (1.753 m)   Wt 183 lb 12.8 oz (83.4 kg)   SpO2 99%   BMI 27.14 kg/m²       General:  Well defined, nourished, and well groomed individual in no acute distress. Neck: Supple, nontender, normal range of motion. Musculoskeletal:  Extremities revealed no edema and had full range of motion of joints. Psych:  Good mood and bright affect with his niece and daughter. NEUROLOGICAL EXAMINATION:     Mental Status:   Alert and oriented to person, place, and time with recent and remote memory intact. Attention span and concentration are normal. Clear speech. Fund of knowledge preserved. 3/3 word recall. Clock Test: didn't draw any numbers on the clock or the hour or minute hand. Pt was not able to spell world backwards. Cranial Nerves:   PERRLA. Visual fields were full  EOM: no evidence of nystagmus  Facial sensation:  normal and symmetric  Facial motor: normal and symmetric, no facial droop noted.   Hearing intact  SCM strength intact  Tongue: midline without fasciculations    Motor Examination: Normal tone. 5/5 muscle strength in bilateral upper and lower extremities. No cogwheel rigidity. No muscle wasting, no twitching or fasciculation noted. Sensory exam:  Normal throughout to light touch in BUE. Coordination:   Finger to nose and rapid arm movement testing was normal.  No resting or intention tremor. Negative Romberg, negative pronator drift. Gait and Station:  Steady did well with tandem walking. Normal arm swing. Reflexes:  DTRs 2+ in bilateral biceps, brachioradialis, patella and ankle. No clonus noted. ASSESSMENT AND PLAN      ICD-10-CM ICD-9-CM    1. Dementia of the Alzheimer's type without behavioral disturbance ([facility])  G30.9 331.0 REFERRAL TO OCCUPATIONAL THERAPY    F02.80 294.10 memantine (Namenda) 10 mg tablet      REFERRAL TO NEUROPSYCHOLOGY      2. Anxiety  F41.9 300.00         1. Dementia of the Alzheimer's type without behavioral disturbance [facility]): I placed a referral for neuropsych testing, will modify plan of care based on results. Patient is continue Aricept 10 mg nightly, we did adjust his dosage of Namenda from 10 mg daily to 10 mg twice a day. Safety and side effects were discussed. Patient currently has a suspended license, advised patient he is not to drive, I have placed a referral for occupational therapy at [facility] to conduct a driving assessment, based upon the results we can modify his driving recommendations if appropriate. Family is to contact us if any major changes worsening safety or behavior issues or worsening confusion. The lifestyle encouraged. -     REFERRAL TO OCCUPATIONAL THERAPY  -     memantine (Namenda) 10 mg tablet; Take 1 Tablet by mouth two (2) times a day., Normal, Disp-60 Tablet, R-5  -     REFERRAL TO NEUROPSYCHOLOGY  2.  Anxiety: I am hopeful that the increase of the Namenda will help however if worsening anxiety or agitation could consider supplementing medication to help with any mood disturbance. Discussed different ways to help keep him busy and active lessening stressors and anxieties. Patient and/or family verbalized understand of all instructions and all questions/concerns were addressed. Safety/side effects of medications discussed. Patient remains a complex patient secondary to polypharmacy, significant comorbid conditions, and use of high-risk medications which complicate the decision making process related to patient's neurologic diagnosis. We will see the patient back in  6   months, sooner if needed.       Alison Cameron, NORIP-BC

## 2023-04-19 ENCOUNTER — TELEPHONE (OUTPATIENT)
Dept: NEUROLOGY | Age: 73
End: 2023-04-19

## 2023-04-19 NOTE — TELEPHONE ENCOUNTER
Pt's daughter called stating that pt failed his driving simulation. Wants to know if this test can be retaken after a period of time or is it a finite decision?  Please call [place] [phone]

## 2023-04-21 NOTE — TELEPHONE ENCOUNTER
Patient daughter Lele Santiago on [address] Verified patient with two identifiers. Informed at this time recommend he turn in his license to [facility] if not willing contact us back and we will advise.

## 2023-09-27 RX ORDER — DONEPEZIL HYDROCHLORIDE 10 MG/1
10 TABLET, FILM COATED ORAL EVERY EVENING
Qty: 90 TABLET | Refills: 0 | Status: SHIPPED | OUTPATIENT
Start: 2023-09-27

## 2023-12-01 ENCOUNTER — CLINICAL DOCUMENTATION (OUTPATIENT)
Age: 73
End: 2023-12-01

## 2023-12-01 NOTE — PROGRESS NOTES
Received fax from [address] requesting refill on memantine. Faxed back advising patient needs to schedule appt for further refills. He was supposed to follow up on 9/22/23 and cancelled appt. No further appt scheduled with Elif.  Received confirmation that fax went through successfully

## 2024-02-21 NOTE — TELEPHONE ENCOUNTER
Alba Barnett states copay is too high for the namzaric script. States she was supposed to call back if it was to get a new script for 2 pills instead of one.  Please call with questions [phone] You are due for your mammogram now.     You are due for all your labs and urine testing in May, 2024.     You are due for your diabetic eye exam in June.

## 2024-08-30 ENCOUNTER — APPOINTMENT (OUTPATIENT)
Facility: HOSPITAL | Age: 74
DRG: 177 | End: 2024-08-30
Payer: MEDICARE

## 2024-08-30 ENCOUNTER — HOSPITAL ENCOUNTER (INPATIENT)
Facility: HOSPITAL | Age: 74
LOS: 2 days | Discharge: HOME | DRG: 177 | End: 2024-09-02
Attending: STUDENT IN AN ORGANIZED HEALTH CARE EDUCATION/TRAINING PROGRAM | Admitting: GENERAL ACUTE CARE HOSPITAL
Payer: MEDICARE

## 2024-08-30 DIAGNOSIS — G93.40 ENCEPHALOPATHY: Primary | ICD-10-CM

## 2024-08-30 DIAGNOSIS — I63.9 CEREBROVASCULAR ACCIDENT (CVA), UNSPECIFIED MECHANISM (HCC): ICD-10-CM

## 2024-08-30 LAB
ALBUMIN SERPL-MCNC: 4 G/DL (ref 3.5–5)
ALBUMIN/GLOB SERPL: 1 (ref 1.1–2.2)
ALP SERPL-CCNC: 44 U/L (ref 45–117)
ALT SERPL-CCNC: 23 U/L (ref 12–78)
ANION GAP SERPL CALC-SCNC: 8 MMOL/L (ref 5–15)
APPEARANCE UR: CLEAR
AST SERPL-CCNC: 28 U/L (ref 15–37)
BACTERIA URNS QL MICRO: NEGATIVE /HPF
BASOPHILS # BLD: 0 K/UL (ref 0–0.1)
BASOPHILS NFR BLD: 0 % (ref 0–1)
BILIRUB SERPL-MCNC: 1.4 MG/DL (ref 0.2–1)
BILIRUB UR QL: NEGATIVE
BUN SERPL-MCNC: 17 MG/DL (ref 6–20)
BUN/CREAT SERPL: 9 (ref 12–20)
CALCIUM SERPL-MCNC: 9.7 MG/DL (ref 8.5–10.1)
CHLORIDE SERPL-SCNC: 102 MMOL/L (ref 97–108)
CO2 SERPL-SCNC: 23 MMOL/L (ref 21–32)
COLOR UR: NORMAL
COMMENT:: NORMAL
CREAT SERPL-MCNC: 1.82 MG/DL (ref 0.7–1.3)
DIFFERENTIAL METHOD BLD: ABNORMAL
EOSINOPHIL # BLD: 0 K/UL (ref 0–0.4)
EOSINOPHIL NFR BLD: 0 % (ref 0–7)
EPITH CASTS URNS QL MICRO: NORMAL /LPF
ERYTHROCYTE [DISTWIDTH] IN BLOOD BY AUTOMATED COUNT: 13.7 % (ref 11.5–14.5)
FLUAV RNA SPEC QL NAA+PROBE: NOT DETECTED
FLUBV RNA SPEC QL NAA+PROBE: NOT DETECTED
GLOBULIN SER CALC-MCNC: 4.1 G/DL (ref 2–4)
GLUCOSE BLD STRIP.AUTO-MCNC: 145 MG/DL (ref 65–117)
GLUCOSE BLD STRIP.AUTO-MCNC: 151 MG/DL (ref 65–117)
GLUCOSE SERPL-MCNC: 183 MG/DL (ref 65–100)
GLUCOSE UR STRIP.AUTO-MCNC: NEGATIVE MG/DL
HCT VFR BLD AUTO: 40.6 % (ref 36.6–50.3)
HGB BLD-MCNC: 13.8 G/DL (ref 12.1–17)
HGB UR QL STRIP: NEGATIVE
HYALINE CASTS URNS QL MICRO: NORMAL /LPF (ref 0–2)
IMM GRANULOCYTES # BLD AUTO: 0.1 K/UL (ref 0–0.04)
IMM GRANULOCYTES NFR BLD AUTO: 1 % (ref 0–0.5)
INR PPP: 1.1 (ref 0.9–1.1)
KETONES UR QL STRIP.AUTO: NEGATIVE MG/DL
LEUKOCYTE ESTERASE UR QL STRIP.AUTO: NEGATIVE
LYMPHOCYTES # BLD: 0.4 K/UL (ref 0.8–3.5)
LYMPHOCYTES NFR BLD: 3 % (ref 12–49)
MCH RBC QN AUTO: 33.5 PG (ref 26–34)
MCHC RBC AUTO-ENTMCNC: 34 G/DL (ref 30–36.5)
MCV RBC AUTO: 98.5 FL (ref 80–99)
MONOCYTES # BLD: 0.8 K/UL (ref 0–1)
MONOCYTES NFR BLD: 6 % (ref 5–13)
NEUTS SEG # BLD: 11.8 K/UL (ref 1.8–8)
NEUTS SEG NFR BLD: 90 % (ref 32–75)
NITRITE UR QL STRIP.AUTO: NEGATIVE
NRBC # BLD: 0 K/UL (ref 0–0.01)
NRBC BLD-RTO: 0 PER 100 WBC
PH UR STRIP: 6 (ref 5–8)
PLATELET # BLD AUTO: 206 K/UL (ref 150–400)
PMV BLD AUTO: 11.1 FL (ref 8.9–12.9)
POTASSIUM SERPL-SCNC: 4.4 MMOL/L (ref 3.5–5.1)
PROT SERPL-MCNC: 8.1 G/DL (ref 6.4–8.2)
PROT UR STRIP-MCNC: NEGATIVE MG/DL
PROTHROMBIN TIME: 11.2 SEC (ref 9–11.1)
RBC # BLD AUTO: 4.12 M/UL (ref 4.1–5.7)
RBC #/AREA URNS HPF: NORMAL /HPF (ref 0–5)
RBC MORPH BLD: ABNORMAL
SARS-COV-2 RNA RESP QL NAA+PROBE: DETECTED
SERVICE CMNT-IMP: ABNORMAL
SERVICE CMNT-IMP: ABNORMAL
SODIUM SERPL-SCNC: 133 MMOL/L (ref 136–145)
SOURCE: ABNORMAL
SP GR UR REFRACTOMETRY: 1 (ref 1–1.03)
SPECIMEN HOLD: NORMAL
TROPONIN I SERPL HS-MCNC: 4 NG/L (ref 0–76)
URINE CULTURE IF INDICATED: NORMAL
UROBILINOGEN UR QL STRIP.AUTO: 1 EU/DL (ref 0.2–1)
WBC # BLD AUTO: 13.1 K/UL (ref 4.1–11.1)
WBC URNS QL MICRO: NORMAL /HPF (ref 0–4)

## 2024-08-30 PROCEDURE — 84484 ASSAY OF TROPONIN QUANT: CPT

## 2024-08-30 PROCEDURE — 70498 CT ANGIOGRAPHY NECK: CPT

## 2024-08-30 PROCEDURE — 6360000004 HC RX CONTRAST MEDICATION: Performed by: STUDENT IN AN ORGANIZED HEALTH CARE EDUCATION/TRAINING PROGRAM

## 2024-08-30 PROCEDURE — 82962 GLUCOSE BLOOD TEST: CPT

## 2024-08-30 PROCEDURE — 80053 COMPREHEN METABOLIC PANEL: CPT

## 2024-08-30 PROCEDURE — 81001 URINALYSIS AUTO W/SCOPE: CPT

## 2024-08-30 PROCEDURE — 70450 CT HEAD/BRAIN W/O DYE: CPT

## 2024-08-30 PROCEDURE — 71045 X-RAY EXAM CHEST 1 VIEW: CPT

## 2024-08-30 PROCEDURE — 85025 COMPLETE CBC W/AUTO DIFF WBC: CPT

## 2024-08-30 PROCEDURE — 87636 SARSCOV2 & INF A&B AMP PRB: CPT

## 2024-08-30 PROCEDURE — 85610 PROTHROMBIN TIME: CPT

## 2024-08-30 PROCEDURE — 99285 EMERGENCY DEPT VISIT HI MDM: CPT

## 2024-08-30 PROCEDURE — 93005 ELECTROCARDIOGRAM TRACING: CPT | Performed by: STUDENT IN AN ORGANIZED HEALTH CARE EDUCATION/TRAINING PROGRAM

## 2024-08-30 PROCEDURE — 4A03X5D MEASUREMENT OF ARTERIAL FLOW, INTRACRANIAL, EXTERNAL APPROACH: ICD-10-PCS | Performed by: STUDENT IN AN ORGANIZED HEALTH CARE EDUCATION/TRAINING PROGRAM

## 2024-08-30 PROCEDURE — 36415 COLL VENOUS BLD VENIPUNCTURE: CPT

## 2024-08-30 PROCEDURE — 0042T CT BRAIN PERFUSION: CPT

## 2024-08-30 RX ORDER — IOPAMIDOL 755 MG/ML
100 INJECTION, SOLUTION INTRAVASCULAR
Status: COMPLETED | OUTPATIENT
Start: 2024-08-30 | End: 2024-08-30

## 2024-08-30 RX ADMIN — IOPAMIDOL 100 ML: 755 INJECTION, SOLUTION INTRAVENOUS at 19:52

## 2024-08-30 ASSESSMENT — LIFESTYLE VARIABLES
HOW MANY STANDARD DRINKS CONTAINING ALCOHOL DO YOU HAVE ON A TYPICAL DAY: PATIENT DOES NOT DRINK
HOW OFTEN DO YOU HAVE A DRINK CONTAINING ALCOHOL: NEVER

## 2024-08-30 NOTE — ED NOTES
Code stroke level 2 called overhead per MD Harrell  
[No studies available for review at this time] : No studies available for review at this time

## 2024-08-31 ENCOUNTER — APPOINTMENT (OUTPATIENT)
Facility: HOSPITAL | Age: 74
DRG: 177 | End: 2024-08-31
Payer: MEDICARE

## 2024-08-31 PROBLEM — U07.1 COVID-19: Status: ACTIVE | Noted: 2024-08-31

## 2024-08-31 LAB
ALBUMIN SERPL-MCNC: 4.2 G/DL (ref 3.5–5)
ALBUMIN/GLOB SERPL: 1.1 (ref 1.1–2.2)
ALP SERPL-CCNC: 47 U/L (ref 45–117)
ALT SERPL-CCNC: 24 U/L (ref 12–78)
ANION GAP SERPL CALC-SCNC: 6 MMOL/L (ref 5–15)
AST SERPL-CCNC: 30 U/L (ref 15–37)
BILIRUB SERPL-MCNC: 1.2 MG/DL (ref 0.2–1)
BUN SERPL-MCNC: 16 MG/DL (ref 6–20)
BUN/CREAT SERPL: 12 (ref 12–20)
CALCIUM SERPL-MCNC: 10.1 MG/DL (ref 8.5–10.1)
CHLORIDE SERPL-SCNC: 106 MMOL/L (ref 97–108)
CO2 SERPL-SCNC: 26 MMOL/L (ref 21–32)
CREAT SERPL-MCNC: 1.35 MG/DL (ref 0.7–1.3)
EKG ATRIAL RATE: 95 BPM
EKG DIAGNOSIS: NORMAL
EKG P AXIS: 48 DEGREES
EKG P-R INTERVAL: 154 MS
EKG Q-T INTERVAL: 356 MS
EKG QRS DURATION: 82 MS
EKG QTC CALCULATION (BAZETT): 447 MS
EKG R AXIS: 47 DEGREES
EKG T AXIS: 19 DEGREES
EKG VENTRICULAR RATE: 95 BPM
ERYTHROCYTE [DISTWIDTH] IN BLOOD BY AUTOMATED COUNT: 13.6 % (ref 11.5–14.5)
GLOBULIN SER CALC-MCNC: 4 G/DL (ref 2–4)
GLUCOSE SERPL-MCNC: 94 MG/DL (ref 65–100)
HCT VFR BLD AUTO: 43.6 % (ref 36.6–50.3)
HGB BLD-MCNC: 14.7 G/DL (ref 12.1–17)
MCH RBC QN AUTO: 32.9 PG (ref 26–34)
MCHC RBC AUTO-ENTMCNC: 33.7 G/DL (ref 30–36.5)
MCV RBC AUTO: 97.5 FL (ref 80–99)
NRBC # BLD: 0 K/UL (ref 0–0.01)
NRBC BLD-RTO: 0 PER 100 WBC
PLATELET # BLD AUTO: 200 K/UL (ref 150–400)
PMV BLD AUTO: 10.4 FL (ref 8.9–12.9)
POTASSIUM SERPL-SCNC: 4.2 MMOL/L (ref 3.5–5.1)
PROT SERPL-MCNC: 8.2 G/DL (ref 6.4–8.2)
RBC # BLD AUTO: 4.47 M/UL (ref 4.1–5.7)
SODIUM SERPL-SCNC: 138 MMOL/L (ref 136–145)
TSH SERPL DL<=0.05 MIU/L-ACNC: 0.33 UIU/ML (ref 0.36–3.74)
WBC # BLD AUTO: 8.9 K/UL (ref 4.1–11.1)

## 2024-08-31 PROCEDURE — A9579 GAD-BASE MR CONTRAST NOS,1ML: HCPCS | Performed by: PSYCHIATRY & NEUROLOGY

## 2024-08-31 PROCEDURE — 1100000000 HC RM PRIVATE

## 2024-08-31 PROCEDURE — 85027 COMPLETE CBC AUTOMATED: CPT

## 2024-08-31 PROCEDURE — 99222 1ST HOSP IP/OBS MODERATE 55: CPT | Performed by: PSYCHIATRY & NEUROLOGY

## 2024-08-31 PROCEDURE — 6360000004 HC RX CONTRAST MEDICATION: Performed by: PSYCHIATRY & NEUROLOGY

## 2024-08-31 PROCEDURE — 6370000000 HC RX 637 (ALT 250 FOR IP): Performed by: INTERNAL MEDICINE

## 2024-08-31 PROCEDURE — 2580000003 HC RX 258: Performed by: GENERAL ACUTE CARE HOSPITAL

## 2024-08-31 PROCEDURE — 70553 MRI BRAIN STEM W/O & W/DYE: CPT

## 2024-08-31 PROCEDURE — 36415 COLL VENOUS BLD VENIPUNCTURE: CPT

## 2024-08-31 PROCEDURE — 84443 ASSAY THYROID STIM HORMONE: CPT

## 2024-08-31 PROCEDURE — 84481 FREE ASSAY (FT-3): CPT

## 2024-08-31 PROCEDURE — 6370000000 HC RX 637 (ALT 250 FOR IP): Performed by: GENERAL ACUTE CARE HOSPITAL

## 2024-08-31 PROCEDURE — 6360000002 HC RX W HCPCS: Performed by: GENERAL ACUTE CARE HOSPITAL

## 2024-08-31 PROCEDURE — 80053 COMPREHEN METABOLIC PANEL: CPT

## 2024-08-31 RX ORDER — ACETAMINOPHEN 325 MG/1
650 TABLET ORAL EVERY 4 HOURS PRN
Status: COMPLETED | OUTPATIENT
Start: 2024-08-31 | End: 2024-09-02

## 2024-08-31 RX ORDER — ERGOCALCIFEROL 1.25 MG/1
50000 CAPSULE, LIQUID FILLED ORAL DAILY
Status: DISCONTINUED | OUTPATIENT
Start: 2024-09-04 | End: 2024-09-02 | Stop reason: HOSPADM

## 2024-08-31 RX ORDER — ONDANSETRON 4 MG/1
4 TABLET, ORALLY DISINTEGRATING ORAL EVERY 8 HOURS PRN
Status: DISCONTINUED | OUTPATIENT
Start: 2024-08-31 | End: 2024-09-02 | Stop reason: HOSPADM

## 2024-08-31 RX ORDER — SODIUM CHLORIDE 0.9 % (FLUSH) 0.9 %
5-40 SYRINGE (ML) INJECTION EVERY 12 HOURS SCHEDULED
Status: DISCONTINUED | OUTPATIENT
Start: 2024-08-31 | End: 2024-09-02 | Stop reason: HOSPADM

## 2024-08-31 RX ORDER — ONDANSETRON 2 MG/ML
4 INJECTION INTRAMUSCULAR; INTRAVENOUS EVERY 6 HOURS PRN
Status: DISCONTINUED | OUTPATIENT
Start: 2024-08-31 | End: 2024-08-31

## 2024-08-31 RX ORDER — IPRATROPIUM BROMIDE AND ALBUTEROL SULFATE 2.5; .5 MG/3ML; MG/3ML
1 SOLUTION RESPIRATORY (INHALATION) EVERY 4 HOURS PRN
Status: DISCONTINUED | OUTPATIENT
Start: 2024-08-31 | End: 2024-09-02 | Stop reason: HOSPADM

## 2024-08-31 RX ORDER — ROSUVASTATIN CALCIUM 10 MG/1
10 TABLET, COATED ORAL DAILY
Status: DISCONTINUED | OUTPATIENT
Start: 2024-08-31 | End: 2024-08-31

## 2024-08-31 RX ORDER — ROSUVASTATIN CALCIUM 40 MG/1
40 TABLET, COATED ORAL DAILY
Status: DISCONTINUED | OUTPATIENT
Start: 2024-08-31 | End: 2024-09-01

## 2024-08-31 RX ORDER — ONDANSETRON 2 MG/ML
4 INJECTION INTRAMUSCULAR; INTRAVENOUS EVERY 6 HOURS PRN
Status: DISCONTINUED | OUTPATIENT
Start: 2024-08-31 | End: 2024-09-02 | Stop reason: HOSPADM

## 2024-08-31 RX ORDER — SODIUM CHLORIDE 0.9 % (FLUSH) 0.9 %
5-40 SYRINGE (ML) INJECTION PRN
Status: DISCONTINUED | OUTPATIENT
Start: 2024-08-31 | End: 2024-09-02 | Stop reason: HOSPADM

## 2024-08-31 RX ORDER — MEMANTINE HYDROCHLORIDE 10 MG/1
10 TABLET ORAL DAILY
Status: DISCONTINUED | OUTPATIENT
Start: 2024-08-31 | End: 2024-09-02 | Stop reason: HOSPADM

## 2024-08-31 RX ORDER — HYDROXYZINE HYDROCHLORIDE 25 MG/1
25 TABLET, FILM COATED ORAL 3 TIMES DAILY
Status: DISCONTINUED | OUTPATIENT
Start: 2024-08-31 | End: 2024-09-02 | Stop reason: HOSPADM

## 2024-08-31 RX ORDER — ASPIRIN 300 MG/1
300 SUPPOSITORY RECTAL DAILY
Status: DISCONTINUED | OUTPATIENT
Start: 2024-08-31 | End: 2024-09-01

## 2024-08-31 RX ORDER — POLYETHYLENE GLYCOL 3350 17 G/17G
17 POWDER, FOR SOLUTION ORAL DAILY PRN
Status: DISCONTINUED | OUTPATIENT
Start: 2024-08-31 | End: 2024-09-02 | Stop reason: HOSPADM

## 2024-08-31 RX ORDER — DONEPEZIL HYDROCHLORIDE 5 MG/1
10 TABLET, FILM COATED ORAL EVERY EVENING
Status: DISCONTINUED | OUTPATIENT
Start: 2024-08-31 | End: 2024-09-02 | Stop reason: HOSPADM

## 2024-08-31 RX ORDER — SODIUM CHLORIDE 9 MG/ML
INJECTION, SOLUTION INTRAVENOUS PRN
Status: DISCONTINUED | OUTPATIENT
Start: 2024-08-31 | End: 2024-09-02 | Stop reason: HOSPADM

## 2024-08-31 RX ORDER — HYDROXYZINE HYDROCHLORIDE 10 MG/1
10 TABLET, FILM COATED ORAL 3 TIMES DAILY
Status: DISCONTINUED | OUTPATIENT
Start: 2024-08-31 | End: 2024-08-31

## 2024-08-31 RX ORDER — GUAIFENESIN 600 MG/1
600 TABLET, EXTENDED RELEASE ORAL 2 TIMES DAILY
Status: DISCONTINUED | OUTPATIENT
Start: 2024-08-31 | End: 2024-09-02 | Stop reason: HOSPADM

## 2024-08-31 RX ORDER — ACETAMINOPHEN 325 MG/1
650 TABLET ORAL EVERY 4 HOURS PRN
Status: DISCONTINUED | OUTPATIENT
Start: 2024-08-31 | End: 2024-08-31

## 2024-08-31 RX ORDER — ASPIRIN 81 MG/1
81 TABLET, CHEWABLE ORAL DAILY
Status: DISCONTINUED | OUTPATIENT
Start: 2024-08-31 | End: 2024-09-01

## 2024-08-31 RX ORDER — ENOXAPARIN SODIUM 100 MG/ML
40 INJECTION SUBCUTANEOUS DAILY
Status: DISCONTINUED | OUTPATIENT
Start: 2024-08-31 | End: 2024-09-02 | Stop reason: HOSPADM

## 2024-08-31 RX ADMIN — SODIUM CHLORIDE, PRESERVATIVE FREE 10 ML: 5 INJECTION INTRAVENOUS at 10:25

## 2024-08-31 RX ADMIN — ENOXAPARIN SODIUM 40 MG: 100 INJECTION SUBCUTANEOUS at 10:25

## 2024-08-31 RX ADMIN — MEMANTINE 10 MG: 10 TABLET ORAL at 10:25

## 2024-08-31 RX ADMIN — GUAIFENESIN 600 MG: 600 TABLET, EXTENDED RELEASE ORAL at 21:29

## 2024-08-31 RX ADMIN — HYDROXYZINE HYDROCHLORIDE 25 MG: 25 TABLET ORAL at 18:47

## 2024-08-31 RX ADMIN — GADOTERIDOL 15 ML: 279.3 INJECTION, SOLUTION INTRAVENOUS at 20:05

## 2024-08-31 RX ADMIN — ROSUVASTATIN CALCIUM 40 MG: 40 TABLET, FILM COATED ORAL at 10:25

## 2024-08-31 RX ADMIN — DONEPEZIL HYDROCHLORIDE 10 MG: 5 TABLET, FILM COATED ORAL at 17:42

## 2024-08-31 RX ADMIN — HYDROXYZINE HYDROCHLORIDE 25 MG: 25 TABLET ORAL at 21:29

## 2024-08-31 RX ADMIN — GUAIFENESIN 600 MG: 600 TABLET, EXTENDED RELEASE ORAL at 10:25

## 2024-08-31 RX ADMIN — ASPIRIN 81 MG: 81 TABLET, CHEWABLE ORAL at 10:25

## 2024-08-31 RX ADMIN — SODIUM CHLORIDE, PRESERVATIVE FREE 10 ML: 5 INJECTION INTRAVENOUS at 21:29

## 2024-08-31 ASSESSMENT — PAIN SCALES - GENERAL: PAINLEVEL_OUTOF10: 0

## 2024-08-31 NOTE — PROGRESS NOTES
-Please complete MRI History and Safety Screening Form.    - Patient cannot be scanned until this form is completed and reviewed in MRI to ensure patient is SAFE and eligible for MRI.  - CALL MRI when this has been successfully completed at [phone].

## 2024-08-31 NOTE — PROGRESS NOTES
Speech pathology note  Reviewed chart and discussed case with RN. SLP consulted as part of stroke order set, however per RN, patient with no focal deficits. Patient with AMS however note baseline dementia per ER note. RN reported patient with no difficulty swallowing, took his medications with thin liquid with no difficulty, and patient informally observed drinking water via large bore straw without incident. SLP will follow for formal swallowing evaluation if indicated pending results of further testing. Thank you.    KLAUS Horowitz Ed., CCC-SLP

## 2024-08-31 NOTE — CONSULTS
CONSULT - Neurology      Name:  Yoel Izaguirre       MRN: [id number]  Location: 139/01    Date: 8/31/2024  Time:  12:17 PM        Chief Complaint:   Chief Complaint   Patient presents with    Aphasia     Patient wheeled into triage with daughter who reports slurred speech and confusion during a phone call 1 hour PTA. Daughter also report inability to bring food to his mouth. Patient present with delayed speech, equal strength in all extremities. Hx dementia. MD Harrell in triage to Evaluate. . LKW 0800 this morning    Fatigue    Altered Mental Status     From home with c/o slurred speech,increase confusion since 0800hrs today.LKW 0800HRS.Hx of dementia and hypertension.       HPI:  It is a great pleasure to see Yoel Izaguirre, a 74 y.o. male today in the hospital . Briefly these are the events happened as per the chart and taken from the chart.  The patient was doing fine and the symptoms started with slurred speech and confusion . The symptoms fluctuated in the beginning. There were no aggravating and relieving factors. The patient was brought to the emergency room for further evaluation. He has COVID  CT head negative for any acute intracranial abnormality.  CTA head and neck negative for Large vessel occlusion            PAST MEDICAL HISTORY:  Past Medical History:   Diagnosis Date    Hypercholesteremia     Memory disorder     Thyroid disease     hypothyroid     PAST SURGICAL HISTORY:    Past Surgical History:   Procedure Laterality Date    COLONOSCOPY N/A 11/15/2018    COLONOSCOPY performed by Bertram Guevara MD at [facility] ENDOSCOPY     FAMILY HISTORY:    Family History   Problem Relation Age of Onset    Cancer Mother     No Known Problems Father      SOCIAL HISTORY:   Social History     Tobacco Use    Smoking status: Never    Smokeless tobacco: Never   Substance Use Topics    Alcohol use: No      ALLERGIES:   No Known Allergies   HOME MEDICATIONS:  Prior to Admission medications    Medication Sig Start

## 2024-08-31 NOTE — PROGRESS NOTES
-Please complete MRI History and Safety Screening Form.    - Patient cannot be scanned until this form is completed and reviewed in MRI to ensure patient is SAFE and eligible for MRI.  - CALL MRI when this has been successfully completed at [phone].

## 2024-08-31 NOTE — H&P
automatic exposure control for dose modulation. FINDINGS: The ventricles and sulci are more prominent but fairly symmetric in size, shape and configuration and midline. There is no significant white matter disease. There is no intracranial hemorrhage, extra-axial collection, mass, mass effect or midline shift.  The basilar cisterns are open. No acute infarct is identified. The bone windows demonstrate no abnormalities. The visualized portions of the paranasal sinuses and mastoid air cells are clear.     No acute intracranial process identified by noncontrast CT Electronically signed by Ruthie Cruz       _______________________________________________________________________      TOTAL TIME:  55  Minutes    Critical Care Provided     Minutes non procedure based    Signed: Nancy Silva MD    Procedures: see electronic medical records for all procedures/Xrays and details which were not copied into this note but were reviewed prior to creation of Plan.

## 2024-08-31 NOTE — PROGRESS NOTES
End of Shift Note    Bedside shift change report given to Susan SIMPSON (oncoming nurse) by Swapnil Burleson RN .        Shift worked:  Days   Shift summary and any significant changes:     No significant changes. New admit. MRI pending, screening form sent. Up to chair and bathroom throughout day.       Concerns for physician to address:  None   Zone phone for oncoming shift:   [phone]     Patient Information  Yoel Izaguirre  74 y.o.  8/30/2024  7:38 PM by Nancy Silva MD. Yoel Izaguirre was admitted from [facility]    Problem List  Patient Active Problem List    Diagnosis Date Noted    COVID-19 08/31/2024     Past Medical History:   Diagnosis Date    Hypercholesteremia     Memory disorder     Thyroid disease     hypothyroid       Core Measures:  CVA: yes  CHF: no  PNA: no    Activity:     Number times ambulated in hallways past shift: 0  Number of times OOB to chair past shift: 3    Cardiac:   Cardiac Monitoring: yes, SR    Access:   Current line(s): PIV    Respiratory:   O2 Device: None (Room air)    GI:  Last BM (including prior to admit): 08/29/24  Current diet:  ADULT DIET; Regular  DIET ONE TIME MESSAGE;  Tolerating current diet: Yes    Pain Management:   Patient states pain is manageable on current regimen: yes    Skin:  Roshan Scale Score: 18  Interventions: N/A  Pressure injury: no    Patient Safety:  Fall Score: Chatterjee Total Score: 45  Interventions: bed alarm    DVT prophylaxis:  DVT prophylaxis: meds    Active Consults:  IP CONSULT TO TELE-NEUROLOGY  IP CONSULT TO HOSPITALIST  IP CONSULT TO PHARMACY  IP CONSULT TO NEUROLOGY    Length of Stay:  Expected LOS: 3  Actual LOS: 0    Swapnil Burleson, CALVIN

## 2024-09-01 LAB
CHOLEST SERPL-MCNC: 120 MG/DL
ERYTHROCYTE [DISTWIDTH] IN BLOOD BY AUTOMATED COUNT: 13.4 % (ref 11.5–14.5)
EST. AVERAGE GLUCOSE BLD GHB EST-MCNC: 117 MG/DL
HBA1C MFR BLD: 5.7 % (ref 4–5.6)
HCT VFR BLD AUTO: 43.3 % (ref 36.6–50.3)
HDLC SERPL-MCNC: 52 MG/DL
HDLC SERPL: 2.3 (ref 0–5)
HGB BLD-MCNC: 14.6 G/DL (ref 12.1–17)
LDLC SERPL CALC-MCNC: 54.8 MG/DL (ref 0–100)
MCH RBC QN AUTO: 32.6 PG (ref 26–34)
MCHC RBC AUTO-ENTMCNC: 33.7 G/DL (ref 30–36.5)
MCV RBC AUTO: 96.7 FL (ref 80–99)
NRBC # BLD: 0 K/UL (ref 0–0.01)
NRBC BLD-RTO: 0 PER 100 WBC
PLATELET # BLD AUTO: 193 K/UL (ref 150–400)
PMV BLD AUTO: 10.5 FL (ref 8.9–12.9)
RBC # BLD AUTO: 4.48 M/UL (ref 4.1–5.7)
TRIGL SERPL-MCNC: 66 MG/DL
VLDLC SERPL CALC-MCNC: 13.2 MG/DL
WBC # BLD AUTO: 7.7 K/UL (ref 4.1–11.1)

## 2024-09-01 PROCEDURE — 2580000003 HC RX 258: Performed by: GENERAL ACUTE CARE HOSPITAL

## 2024-09-01 PROCEDURE — 83036 HEMOGLOBIN GLYCOSYLATED A1C: CPT

## 2024-09-01 PROCEDURE — 1100000000 HC RM PRIVATE

## 2024-09-01 PROCEDURE — 85027 COMPLETE CBC AUTOMATED: CPT

## 2024-09-01 PROCEDURE — 6370000000 HC RX 637 (ALT 250 FOR IP): Performed by: INTERNAL MEDICINE

## 2024-09-01 PROCEDURE — 6360000002 HC RX W HCPCS: Performed by: GENERAL ACUTE CARE HOSPITAL

## 2024-09-01 PROCEDURE — 36415 COLL VENOUS BLD VENIPUNCTURE: CPT

## 2024-09-01 PROCEDURE — 6370000000 HC RX 637 (ALT 250 FOR IP): Performed by: STUDENT IN AN ORGANIZED HEALTH CARE EDUCATION/TRAINING PROGRAM

## 2024-09-01 PROCEDURE — 80061 LIPID PANEL: CPT

## 2024-09-01 PROCEDURE — 6370000000 HC RX 637 (ALT 250 FOR IP): Performed by: GENERAL ACUTE CARE HOSPITAL

## 2024-09-01 RX ORDER — ROSUVASTATIN CALCIUM 10 MG/1
10 TABLET, COATED ORAL DAILY
Status: DISCONTINUED | OUTPATIENT
Start: 2024-09-02 | End: 2024-09-01

## 2024-09-01 RX ORDER — LEVOTHYROXINE SODIUM 100 UG/1
100 TABLET ORAL DAILY
Status: ON HOLD | COMMUNITY
End: 2024-09-02

## 2024-09-01 RX ORDER — LEVOTHYROXINE SODIUM 50 UG/1
50 TABLET ORAL
Status: DISCONTINUED | OUTPATIENT
Start: 2024-09-02 | End: 2024-09-02 | Stop reason: HOSPADM

## 2024-09-01 RX ADMIN — GUAIFENESIN 600 MG: 600 TABLET, EXTENDED RELEASE ORAL at 20:18

## 2024-09-01 RX ADMIN — SODIUM CHLORIDE, PRESERVATIVE FREE 10 ML: 5 INJECTION INTRAVENOUS at 20:20

## 2024-09-01 RX ADMIN — ROSUVASTATIN CALCIUM 40 MG: 40 TABLET, FILM COATED ORAL at 08:20

## 2024-09-01 RX ADMIN — ACETAMINOPHEN 650 MG: 325 TABLET ORAL at 08:20

## 2024-09-01 RX ADMIN — LEVOTHYROXINE SODIUM 75 MCG: 0.05 TABLET ORAL at 05:33

## 2024-09-01 RX ADMIN — ASPIRIN 81 MG: 81 TABLET, CHEWABLE ORAL at 08:20

## 2024-09-01 RX ADMIN — SODIUM CHLORIDE, PRESERVATIVE FREE 10 ML: 5 INJECTION INTRAVENOUS at 08:21

## 2024-09-01 RX ADMIN — GUAIFENESIN 600 MG: 600 TABLET, EXTENDED RELEASE ORAL at 08:20

## 2024-09-01 RX ADMIN — HYDROXYZINE HYDROCHLORIDE 25 MG: 25 TABLET ORAL at 20:18

## 2024-09-01 RX ADMIN — NIRMATRELVIR AND RITONAVIR 2 TABLET: KIT at 21:56

## 2024-09-01 RX ADMIN — HYDROXYZINE HYDROCHLORIDE 25 MG: 25 TABLET ORAL at 08:21

## 2024-09-01 RX ADMIN — DONEPEZIL HYDROCHLORIDE 10 MG: 5 TABLET, FILM COATED ORAL at 17:23

## 2024-09-01 RX ADMIN — ENOXAPARIN SODIUM 40 MG: 100 INJECTION SUBCUTANEOUS at 08:20

## 2024-09-01 RX ADMIN — MEMANTINE 10 MG: 10 TABLET ORAL at 08:20

## 2024-09-01 RX ADMIN — HYDROXYZINE HYDROCHLORIDE 25 MG: 25 TABLET ORAL at 15:19

## 2024-09-01 ASSESSMENT — PAIN SCALES - GENERAL: PAINLEVEL_OUTOF10: 0

## 2024-09-01 NOTE — PROGRESS NOTES
End of Shift Note    Bedside shift change report given to Franca SIMPSON (oncoming nurse) by Swapnil Burleson RN .        Shift worked:  Days   Shift summary and any significant changes:     No significant changes.Up to chair and bathroom throughout day.       Concerns for physician to address:  None   Zone phone for oncoming shift:   [phone]     Patient Information  Yoel Izaguirre  74 y.o.  8/30/2024  7:38 PM by Nancy Silva MD. Yoel Izaguirre was admitted from e    Problem List  Patient Active Problem List    Diagnosis Date Noted    COVID-19 08/31/2024     Past Medical History:   Diagnosis Date    Hypercholesteremia     Memory disorder     Thyroid disease     hypothyroid       Core Measures:  CVA: yes  CHF: no  PNA: no    Activity:     Number times ambulated in hallways past shift: 0  Number of times OOB to chair past shift: 3    Cardiac:   Cardiac Monitoring: yes, SR    Access:   Current line(s): PIV    Respiratory:   O2 Device: None (Room air)    GI:  Last BM (including prior to admit): 08/29/24  Current diet:  ADULT DIET; Regular  DIET ONE TIME MESSAGE;  Tolerating current diet: Yes    Pain Management:   Patient states pain is manageable on current regimen: yes    Skin:  Roshan Scale Score: 19  Interventions: N/A  Pressure injury: no    Patient Safety:  Fall Score: Chatterjee Total Score: 60  Interventions: bed alarm    DVT prophylaxis:  DVT prophylaxis: meds    Active Consults:  IP CONSULT TO TELE-NEUROLOGY  IP CONSULT TO HOSPITALIST  IP CONSULT TO PHARMACY  IP CONSULT TO NEUROLOGY  IP CONSULT TO PHARMACY    Length of Stay:  Expected LOS: 3  Actual LOS: 1    Swapnil Burleson RN

## 2024-09-01 NOTE — CASE COMMUNICATION
COMMUNICATION NOTE - Neurology Service    Name:  Yoel Izaguirre     MRN: [id number]         Communication Note:   MRI brain - No acute intracranial abnormality.   I explained in detail about the current condition.   Rest of the management per primary team.  Neurology will sign off.   Please do not hesitate to call with questions or concerns.  Please let us know if we can provide any additional information.

## 2024-09-01 NOTE — PROGRESS NOTES
Hospitalist Progress Note    NAME:   Yoel Izaugirre   : 1950   MRN: [id number]     Date/Time: 2024    Patient PCP: Felipa Sanchez APRN - NP      Assessment / Plan:      AMS  Aphasia  Acute COVID infection  Rule out CVA  Cont statin  Neurology consulted  MRI brain neg  No need for Echocardiogram  PT/OT/SLP  Family requested to keep till tmw to have more help at home     Acute COVID infection  Afebrile  Not requiring oxygen  Chest x-ray clear  Symptomatic treatment  Incentive spirometer  Mucinex  Albuterol as needed     Dementia  Monitor for delirium  Resume donepezil and Namenda     Hypothyroidism  Resume Synthroid at a lower dose as TSH is suppressed       Medical Decision Making:   I personally reviewed labs: yes, as below   I personally reviewed imaging reports: yes, as below   Toxic drug monitoring:   Discussed case with: Pt, RN   Code Status: Full Code       Subjective:  Assessment / Plan:      No fever  No SOB  No diarrhea  Good appetite   Discussed with RN events overnight.       Objective:      VITALS:   Last 24hrs VS reviewed since prior progress note. Most recent are:  Patient Vitals for the past 24 hrs:   BP Temp Temp src Pulse Resp SpO2   24 0728 139/81 99.9 °F (37.7 °C) Oral 78 20 97 %   24 0337 112/73 99.7 °F (37.6 °C) Oral 79 17 96 %   24 2338 121/74 99.9 °F (37.7 °C) Oral 74 18 98 %   24 2134 133/77 99.3 °F (37.4 °C) Oral 75 19 98 %   24 1556 (!) 151/81 98.8 °F (37.1 °C) Oral 87 16 95 %   24 1145 (!) 152/76 98.4 °F (36.9 °C) Oral 78 16 97 %         Intake/Output Summary (Last 24 hours) at 2024 1100  Last data filed at 2024 0535  Gross per 24 hour   Intake 575 ml   Output 454 ml   Net 121 ml        I had a face to face encounter and independently examined this patient, as outlined below:  PHYSICAL EXAM:  General: WD, WN. No acute distress    EENT:  EOMI. Anicteric sclerae. MMM  Resp:  CTA bilaterally. No wheezing. No rales.  No  Oral, Daily, Nancy Silva MD, 40 mg at 09/01/24 0820    ipratropium 0.5 mg-albuterol 2.5 mg (DUONEB) nebulizer solution 1 Dose, 1 Dose, Inhalation, Q4H PRN, Nancy Silva MD    guaiFENesin (MUCINEX) extended release tablet 600 mg, 600 mg, Oral, BID, Nancy Silva MD, 600 mg at 09/01/24 0820    hydrOXYzine HCl (ATARAX) tablet 25 mg, 25 mg, Oral, TID, Issa Porter MD, 25 mg at 09/01/24 0821       LABS:    I reviewed today's most current labs and imaging studies.    Pertinent labs include:  Recent Labs     08/30/24  1930 08/31/24  1210 09/01/24  0335   WBC 13.1* 8.9 7.7   HGB 13.8 14.7 14.6   HCT 40.6 43.6 43.3    200 193     Recent Labs     08/30/24  1930 08/31/24  1030   * 138   K 4.4 4.2    106   CO2 23 26   GLUCOSE 183* 94   BUN 17 16   CREATININE 1.82* 1.35*   CALCIUM 9.7 10.1   BILITOT 1.4* 1.2*   AST 28 30   ALT 23 24   INR 1.1  --      Xray Result (most recent):  MRI BRAIN W WO CONTRAST  Narrative: EXAM:  MRI BRAIN W WO CONTRAST    INDICATION:    cva    COMPARISON: 8/30/2024 and 12/4/2016..    CONTRAST: 50 ml ProHance.    TECHNIQUE:    Multiplanar multisequence acquisition without and with IV contrast of the brain.    FINDINGS:  The ventricles are normal in size and position. There is no acute infarct,  hemorrhage, extra-axial fluid collection, or mass effect. There is no cerebellar  tonsillar herniation. Expected arterial flow-voids are present. No evidence of  abnormal enhancement.    The paranasal sinuses, mastoid air cells, and middle ears are clear. The orbital  contents are within normal limits. No significant osseous or scalp lesions are  identified.  Impression: No acute infarct or other acute findings    Electronically signed by Mendez Cortez     Microbiology:  Results       Procedure Component Value Units Date/Time    COVID-19 & Influenza Combo [[id number]]  (Abnormal) Collected: 08/30/24 2220    Order Status: Completed Specimen: Nasopharyngeal

## 2024-09-01 NOTE — PROGRESS NOTES
End of Shift Note    Bedside shift change report given to zane SIMPSON (oncoming nurse) by Susan Quan, RN .        Shift worked:  7p-7a   Shift summary and any significant changes:     No significant changes.  MRI done       Concerns for physician to address:  None   Zone phone for oncoming shift:   [phone]     Patient Information  Yoel Izaguirre  74 y.o.  8/30/2024  7:38 PM by Nancy Silva MD. Yoel Izaguirre was admitted from e    Problem List  Patient Active Problem List    Diagnosis Date Noted    COVID-19 08/31/2024     Past Medical History:   Diagnosis Date    Hypercholesteremia     Memory disorder     Thyroid disease     hypothyroid       Core Measures:  CVA: yes  CHF: no  PNA: no    Activity:     Number times ambulated in hallways past shift: 0  Number of times OOB to chair past shift: 3    Cardiac:   Cardiac Monitoring: yes, SR    Access:   Current line(s): PIV    Respiratory:   O2 Device: None (Room air)    GI:  Last BM (including prior to admit): 08/29/24  Current diet:  ADULT DIET; Regular  DIET ONE TIME MESSAGE;  Tolerating current diet: Yes    Pain Management:   Patient states pain is manageable on current regimen: yes    Skin:  Roshan Scale Score: 19  Interventions: N/A  Pressure injury: no    Patient Safety:  Fall Score: Chatterjee Total Score: 60  Interventions: bed alarm    DVT prophylaxis:  DVT prophylaxis: meds    Active Consults:  IP CONSULT TO TELE-NEUROLOGY  IP CONSULT TO HOSPITALIST  IP CONSULT TO PHARMACY  IP CONSULT TO NEUROLOGY    Length of Stay:  Expected LOS: 3  Actual LOS: 1    Susan Quan, RN

## 2024-09-02 VITALS
TEMPERATURE: 97.7 F | SYSTOLIC BLOOD PRESSURE: 132 MMHG | HEIGHT: 71 IN | BODY MASS INDEX: 24.36 KG/M2 | RESPIRATION RATE: 18 BRPM | WEIGHT: 174 LBS | DIASTOLIC BLOOD PRESSURE: 73 MMHG | HEART RATE: 70 BPM | OXYGEN SATURATION: 99 %

## 2024-09-02 LAB
25(OH)D3 SERPL-MCNC: 91.2 NG/ML (ref 30–100)
ANION GAP SERPL CALC-SCNC: 6 MMOL/L (ref 5–15)
BUN SERPL-MCNC: 13 MG/DL (ref 6–20)
BUN/CREAT SERPL: 10 (ref 12–20)
CALCIUM SERPL-MCNC: 9.7 MG/DL (ref 8.5–10.1)
CHLORIDE SERPL-SCNC: 105 MMOL/L (ref 97–108)
CO2 SERPL-SCNC: 25 MMOL/L (ref 21–32)
CREAT SERPL-MCNC: 1.29 MG/DL (ref 0.7–1.3)
FOLATE SERPL-MCNC: 16.3 NG/ML (ref 5–21)
GLUCOSE SERPL-MCNC: 97 MG/DL (ref 65–100)
MAGNESIUM SERPL-MCNC: 2.2 MG/DL (ref 1.6–2.4)
PHOSPHATE SERPL-MCNC: 3.1 MG/DL (ref 2.6–4.7)
POTASSIUM SERPL-SCNC: 4.4 MMOL/L (ref 3.5–5.1)
SODIUM SERPL-SCNC: 136 MMOL/L (ref 136–145)
T3FREE SERPL-MCNC: 2.8 PG/ML (ref 2.2–4)
T4 FREE SERPL-MCNC: 1.1 NG/DL (ref 0.8–1.5)
VIT B12 SERPL-MCNC: 1778 PG/ML (ref 193–986)

## 2024-09-02 PROCEDURE — 6370000000 HC RX 637 (ALT 250 FOR IP): Performed by: STUDENT IN AN ORGANIZED HEALTH CARE EDUCATION/TRAINING PROGRAM

## 2024-09-02 PROCEDURE — 97116 GAIT TRAINING THERAPY: CPT

## 2024-09-02 PROCEDURE — 2580000003 HC RX 258: Performed by: GENERAL ACUTE CARE HOSPITAL

## 2024-09-02 PROCEDURE — 84100 ASSAY OF PHOSPHORUS: CPT

## 2024-09-02 PROCEDURE — 6370000000 HC RX 637 (ALT 250 FOR IP): Performed by: INTERNAL MEDICINE

## 2024-09-02 PROCEDURE — 36415 COLL VENOUS BLD VENIPUNCTURE: CPT

## 2024-09-02 PROCEDURE — 97161 PT EVAL LOW COMPLEX 20 MIN: CPT

## 2024-09-02 PROCEDURE — 6370000000 HC RX 637 (ALT 250 FOR IP): Performed by: GENERAL ACUTE CARE HOSPITAL

## 2024-09-02 PROCEDURE — 80048 BASIC METABOLIC PNL TOTAL CA: CPT

## 2024-09-02 PROCEDURE — 82746 ASSAY OF FOLIC ACID SERUM: CPT

## 2024-09-02 PROCEDURE — 83735 ASSAY OF MAGNESIUM: CPT

## 2024-09-02 PROCEDURE — 82607 VITAMIN B-12: CPT

## 2024-09-02 PROCEDURE — 6360000002 HC RX W HCPCS: Performed by: GENERAL ACUTE CARE HOSPITAL

## 2024-09-02 PROCEDURE — 82306 VITAMIN D 25 HYDROXY: CPT

## 2024-09-02 PROCEDURE — 84439 ASSAY OF FREE THYROXINE: CPT

## 2024-09-02 RX ORDER — GUAIFENESIN/DEXTROMETHORPHAN 100-10MG/5
5 SYRUP ORAL 3 TIMES DAILY PRN
Qty: 120 ML | Refills: 0 | Status: SHIPPED | OUTPATIENT
Start: 2024-09-02 | End: 2024-09-12

## 2024-09-02 RX ORDER — LEVOTHYROXINE SODIUM 88 UG/1
100 TABLET ORAL DAILY
Qty: 30 TABLET | Refills: 3 | Status: SHIPPED | OUTPATIENT
Start: 2024-09-02 | End: 2024-10-02

## 2024-09-02 RX ADMIN — ACETAMINOPHEN 650 MG: 325 TABLET ORAL at 07:38

## 2024-09-02 RX ADMIN — NIRMATRELVIR AND RITONAVIR 2 TABLET: KIT at 07:47

## 2024-09-02 RX ADMIN — MEMANTINE 10 MG: 10 TABLET ORAL at 07:38

## 2024-09-02 RX ADMIN — HYDROXYZINE HYDROCHLORIDE 25 MG: 25 TABLET ORAL at 07:38

## 2024-09-02 RX ADMIN — LEVOTHYROXINE SODIUM 50 MCG: 0.05 TABLET ORAL at 05:40

## 2024-09-02 RX ADMIN — GUAIFENESIN 600 MG: 600 TABLET, EXTENDED RELEASE ORAL at 07:38

## 2024-09-02 RX ADMIN — ENOXAPARIN SODIUM 40 MG: 100 INJECTION SUBCUTANEOUS at 07:38

## 2024-09-02 RX ADMIN — SODIUM CHLORIDE, PRESERVATIVE FREE 10 ML: 5 INJECTION INTRAVENOUS at 07:38

## 2024-09-02 ASSESSMENT — PAIN SCALES - GENERAL: PAINLEVEL_OUTOF10: 0

## 2024-09-02 NOTE — PROGRESS NOTES
PHYSICAL THERAPY EVALUATION/DISCHARGE    Patient: Yoel Izaguirre (74 y.o. male)  Date: 9/2/2024  Primary Diagnosis: Encephalopathy [G93.40]  COVID-19 [U07.1]       Precautions: Fall Risk (COVID +)                    ASSESSMENT AND RECOMMENDATIONS:  Based on the objective data below, the patient is functioning close to functional baseline of independent with basic functional mobility and assist/set-up with ADLs, requiring assist for IADLs in setting of baseline cognitive deficits. Patient is oriented to name and requires cues for his birthday and is disoriented to place, situation, and time. Patient has supportive niece at bedside whom is his caregiver and patient has up to 24/7 support from family. Patient is mobilizing largely at independent to supervision level for within room mobility and patient/caregiver report patient's gait appears at baseline (mild sway, no loss of balance). Patient denies fatigue during gait but was educated on energy conservation strategies given COVID diagnosis. Caregiver reports plan to provide increased support as needed initially upon dc. He has no Acute PT needs. Patient/caregiver deny questions or concerns in regards to functional mobility/ADLs for dc home. Will sign off.     Functional Outcome Measure:  The patient scored 23/24 on the [facility] outcome measure which is indicative of decreased likelihood of need for ongoing therapy upon dc.      Further skilled acute physical therapy is not indicated at this time.     PLAN :  Recommendation for discharge: (in order for the patient to meet his/her long term goals):   No skilled physical therapy, home with family support    Other factors to consider for discharge: impaired cognition    IF patient discharges home will need the following DME: none     SUBJECTIVE:   Patient stated “I won't leave the room.” [re: therapist request to stay in home due to covid diagnosis]    OBJECTIVE DATA SUMMARY:     Past Medical History:   Diagnosis Date     PT  Minutes: 25

## 2024-09-02 NOTE — PLAN OF CARE
Problem: Discharge Planning  Goal: Discharge to home or other facility with appropriate resources  Outcome: Progressing     Problem: Skin/Tissue Integrity  Goal: Absence of new skin breakdown  Description: 1.  Monitor for areas of redness and/or skin breakdown  2.  Assess vascular access sites hourly  3.  Every 4-6 hours minimum:  Change oxygen saturation probe site  4.  Every 4-6 hours:  If on nasal continuous positive airway pressure, respiratory therapy assess nares and determine need for appliance change or resting period.  Outcome: Progressing     Problem: Safety - Adult  Goal: Free from fall injury  Outcome: Progressing     Problem: ABCDS Injury Assessment  Goal: Absence of physical injury  Outcome: Progressing     Problem: Neurosensory - Adult  Goal: Achieves stable or improved neurological status  Outcome: Progressing  Goal: Absence of seizures  Outcome: Progressing  Goal: Remains free of injury related to seizures activity  Outcome: Progressing  Goal: Achieves maximal functionality and self care  Outcome: Progressing

## 2024-09-02 NOTE — PLAN OF CARE
Problem: Discharge Planning  Goal: Discharge to home or other facility with appropriate resources  9/2/2024 0035 by Susan Quan RN  Outcome: Progressing  Flowsheets (Taken 8/31/2024 1930)  Discharge to home or other facility with appropriate resources:   Identify barriers to discharge with patient and caregiver   Arrange for needed discharge resources and transportation as appropriate   Identify discharge learning needs (meds, wound care, etc)   Arrange for interpreters to assist at discharge as needed   Refer to discharge planning if patient needs post-hospital services based on physician order or complex needs related to functional status, cognitive ability or social support system  9/1/2024 2219 by Franca Jordan LPN  Outcome: Progressing     Problem: Skin/Tissue Integrity  Goal: Absence of new skin breakdown  Description: 1.  Monitor for areas of redness and/or skin breakdown  2.  Assess vascular access sites hourly  3.  Every 4-6 hours minimum:  Change oxygen saturation probe site  4.  Every 4-6 hours:  If on nasal continuous positive airway pressure, respiratory therapy assess nares and determine need for appliance change or resting period.  9/2/2024 0035 by Susan Quan RN  Outcome: Progressing  9/1/2024 2219 by Franca Jordan LPN  Outcome: Progressing     Problem: Safety - Adult  Goal: Free from fall injury  9/2/2024 0035 by Susan Quan RN  Outcome: Progressing  9/1/2024 2219 by Franca Jordan LPN  Outcome: Progressing     Problem: ABCDS Injury Assessment  Goal: Absence of physical injury  9/2/2024 0035 by Susan Quan RN  Outcome: Progressing  9/1/2024 2219 by Franca Jordan LPN  Outcome: Progressing     Problem: Neurosensory - Adult  Goal: Achieves stable or improved neurological status  9/2/2024 0035 by Susan Quan RN  Outcome: Progressing  9/1/2024 2219 by Franca Jordan LPN  Outcome: Progressing  Goal: Absence of seizures  9/2/2024 0035 by Susan Quan RN  Outcome:

## 2024-09-02 NOTE — PROGRESS NOTES
Speech Therapy Contact Note    Order received and chart reviewed. MRI Brain: \"No acute infarct or other acute findings.\" Patient is currently on a regular texture diet with thin liquids. Will defer SLP evaluation at this time; please re-consult as medically appropriate.     Beryl Martinez, CCC-SLP

## 2024-09-02 NOTE — DISCHARGE INSTRUCTIONS
HOSPITALIST DISCHARGE INSTRUCTIONS    It is important that you take the medication exactly as they are prescribed.   Keep your medication in the bottles provided by the pharmacist and keep a list of the medication names, dosages, and times to be taken in your wallet.   Do not take other medications without consulting your doctor.     What to do at Home  Recommended diet:   ADULT DIET; Regular  DIET ONE TIME MESSAGE;    Recommended activity:   activity as tolerated    If you have questions regarding the hospital related prescriptions or hospital related issues please call [facility]' office at . You can always direct your questions to your primary care doctor if you are unable to reach your hospital physician; your PCP works as an extension of your hospital doctor just like your hospital doctor is an extension of your PCP for your time at the hospital ([facility])    If you experience any of the following symptoms then please call your primary care physician or return to the emergency room if you cannot get hold of your doctor:    Fever, chills, nausea, vomiting, or persistent diarrhea  Worsening weakness or new problems with your speech or balance  Dark stools or visible blood in your stools  New Leg swelling or shortness of breath as these could be signs of a clot    Additional Instructions:    Please follow up with your PCP in one week.   Bring these papers with you to your follow up appointments. The papers will help your doctors be sure to continue the care plan from the hospital.        Information obtained by :  I understand that if any problems occur once I am at home I am to contact my physician.    I understand and acknowledge receipt of the instructions indicated above.                                                                                                                                           Physician's or R.N.'s Signature                                               always ask your healthcare professional. [employer] disclaims any warranty or liability for your use of this information.

## 2024-09-02 NOTE — PROGRESS NOTES
Occupational Therapy  OT referral received, chart reviewed, and attempted to see patient for evaluation vs. OT screen. Patient is presently in the process of discharging per nursing. Per discussion with evaluating PT the patient is near his baseline for mobility and requires some assistance with ADLs PRN at baseline. Per PT the patient's caregiver was present for the PT evaluation and denies any concerns or HH needs regarding patient's mobility or ADL performance.  Will complete OT order.    Alvarez Zuñiga, OTR/L

## 2024-09-02 NOTE — PROGRESS NOTES
Discharge paperwork reviewed with patient and niece. Patient and niece verbalized understanding. Patient and niece confirmed pharmacy and confirmed will make follow up appointments. Patient going home with niece. All LDAs removed.

## 2024-09-02 NOTE — PROGRESS NOTES
End of Shift Note    Bedside shift change report given to Swapnil RN (oncoming nurse) by Franca Jordan LPN .        Shift worked:  Nights   Shift summary and any significant changes:     No significant changes during the night, Labs done       Concerns for physician to address:  See above notes   Zone phone for oncoming shift:   [phone]     Patient Information  Yoel Izaguirre  74 y.o.  8/30/2024  7:38 PM by Nancy Silva MD. Yoel Izaguirre was admitted from e    Problem List  Patient Active Problem List    Diagnosis Date Noted    COVID-19 08/31/2024     Past Medical History:   Diagnosis Date    Hypercholesteremia     Memory disorder     Thyroid disease     hypothyroid       Core Measures:  CVA: yes  CHF: no  PNA: no    Activity:  Level of Assistance: Contact guard assist, steading assist  Number times ambulated in hallways past shift: 0  Number of times OOB to chair past shift: 3    Cardiac:   Cardiac Monitoring: yes, SR    Access:   Current line(s): PIV    Respiratory:   O2 Device: None (Room air)    GI:  Last BM (including prior to admit): 08/30/24  Current diet:  ADULT DIET; Regular  DIET ONE TIME MESSAGE;  Tolerating current diet: Yes    Pain Management:   Patient states pain is manageable on current regimen: yes    Skin:  Roshan Scale Score: 19  Interventions: N/A  Pressure injury: no    Patient Safety:  Fall Score: Chatterjee Total Score: 45  Interventions: bed alarm    DVT prophylaxis:  DVT prophylaxis: meds    Active Consults:  IP CONSULT TO TELE-NEUROLOGY  IP CONSULT TO HOSPITALIST  IP CONSULT TO PHARMACY  IP CONSULT TO NEUROLOGY  IP CONSULT TO PHARMACY    Length of Stay:  Expected LOS: 3  Actual LOS: 2    Franca Jordan LPN

## 2024-09-02 NOTE — DISCHARGE SUMMARY
Discharge Summary    Name: Yoel Izaguirre  [id number]  YOB: 1950 (Age: 74 y.o.)   Date of Admission: 8/30/2024  Date of Discharge: 9/2/2024  Attending Physician: Nancy Silva MD      Discharge Diagnosis:   AMS, Aphasia, d/t metabolic encephalopathy from  Acute COVID infection  Dementia  Hypothyroidism    Consultations:  IP CONSULT TO TELE-NEUROLOGY  IP CONSULT TO HOSPITALIST  IP CONSULT TO PHARMACY  IP CONSULT TO NEUROLOGY  IP CONSULT TO PHARMACY      Brief Admission History/Reason for Admission Per Nancy Silva MD:   CHIEF COMPLAINT:         Aphasia       Patient wheeled into triage with daughter who reports slurred speech and confusion during a phone call 1 hour PTA. Daughter also report inability to bring food to his mouth. Patient present with delayed speech, equal strength in all extremities. Hx dementia. MD Harrell in triage to Evaluate. . LKW 0800 this morning    Fatigue    Altered Mental Status       From home with c/o slurred speech,increase confusion since 0800hrs today.LKW 0800HRS.Hx of dementia and hypertension.         HISTORY OF PRESENT ILLNESS:     Yoel Izaguirre is a 74 y.o.  male with PMHx significant for  has a past medical history of Hypercholesteremia, Memory disorder, and Thyroid disease.      Patient presented to the hospital with daughter.  Patient with dementia at baseline.  Daughter reports that patient developed acute change in mental status with slurred speech, and confusion about 1 hour prior to arrival to the emergency room.  Daughter also reported the patient having difficulty feeding.     ED w/up showed sodium 133, chloride 102, BUN 17, creatinine 1.8, glucose 183, troponin 4, WBC 13.1, positive COVID.  UA negative.     XR CHEST PORTABLE     Result Date: 8/30/2024  No acute process on portable chest. Electronically signed by Mendez Cortez     CTA HEAD NECK W CONTRAST     Result Date: 8/30/2024  No perfusion  take  Another medication with the same name was removed. Continue taking this medication, and follow the directions you see here.            CONTINUE taking these medications      donepezil 10 MG tablet  Commonly known as: ARICEPT  Take 1 tablet by mouth every evening     ergocalciferol 1.25 MG (34459 UT) capsule  Commonly known as: ERGOCALCIFEROL     memantine 10 MG tablet  Commonly known as: NAMENDA     rosuvastatin 10 MG tablet  Commonly known as: CRESTOR            STOP taking these medications      Cholecalciferol 50 MCG (2000 UT) Tabs     Namzaric 7-10 MG Cp24  Generic drug: Memantine HCl-Donepezil HCl               Where to Get Your Medications        These medications were sent to [id number] - [place], [place] - [address] - P [phone] - F [phone] [address] [place] [address]      Phone: [phone]   guaiFENesin-dextromethorphan 100-10 MG/5ML syrup  levothyroxine 88 MCG tablet             DISPOSITION:    Home: x      IPR:                    SNF/LTC:                     AMA:                     Other:                         Code status: Full Code    Recommended diet: ADULT DIET; Regular  DIET ONE TIME MESSAGE;   Recommended activity: activity as tolerated  Wound care: See surgical/procedure care instructions    Follow up with:    Please follow up with your Felipa Sanchez APRN - NP in one week  No follow-up provider specified.        Total time in minutes spent coordinating this discharge (includes going over instructions, follow-up, prescriptions, and preparing report for sign off to her PCP) :  40 minutes    Nancy Silva MD

## 2024-09-17 ENCOUNTER — ANESTHESIA EVENT (OUTPATIENT)
Facility: HOSPITAL | Age: 74
End: 2024-09-17
Payer: MEDICARE

## 2024-09-18 RX ORDER — LISINOPRIL 5 MG/1
5 TABLET ORAL DAILY
COMMUNITY

## 2024-09-19 ENCOUNTER — ANESTHESIA (OUTPATIENT)
Facility: HOSPITAL | Age: 74
End: 2024-09-19
Payer: MEDICARE

## 2024-09-19 ENCOUNTER — HOSPITAL ENCOUNTER (OUTPATIENT)
Facility: HOSPITAL | Age: 74
Setting detail: OUTPATIENT SURGERY
Discharge: HOME | End: 2024-09-19
Attending: INTERNAL MEDICINE | Admitting: INTERNAL MEDICINE
Payer: MEDICARE

## 2024-09-19 VITALS
OXYGEN SATURATION: 95 % | TEMPERATURE: 97.3 F | WEIGHT: 175.2 LBS | DIASTOLIC BLOOD PRESSURE: 71 MMHG | BODY MASS INDEX: 26.55 KG/M2 | HEIGHT: 68 IN | HEART RATE: 72 BPM | SYSTOLIC BLOOD PRESSURE: 113 MMHG | RESPIRATION RATE: 22 BRPM

## 2024-09-19 PROCEDURE — 3600007512: Performed by: INTERNAL MEDICINE

## 2024-09-19 PROCEDURE — 7100000011 HC PHASE II RECOVERY - ADDTL 15 MIN: Performed by: INTERNAL MEDICINE

## 2024-09-19 PROCEDURE — 88305 TISSUE EXAM BY PATHOLOGIST: CPT

## 2024-09-19 PROCEDURE — 2709999900 HC NON-CHARGEABLE SUPPLY: Performed by: INTERNAL MEDICINE

## 2024-09-19 PROCEDURE — 2580000003 HC RX 258: Performed by: INTERNAL MEDICINE

## 2024-09-19 PROCEDURE — 3700000001 HC ADD 15 MINUTES (ANESTHESIA): Performed by: INTERNAL MEDICINE

## 2024-09-19 PROCEDURE — 7100000010 HC PHASE II RECOVERY - FIRST 15 MIN: Performed by: INTERNAL MEDICINE

## 2024-09-19 PROCEDURE — 2500000003 HC RX 250 WO HCPCS

## 2024-09-19 PROCEDURE — 6360000002 HC RX W HCPCS

## 2024-09-19 PROCEDURE — 3700000000 HC ANESTHESIA ATTENDED CARE: Performed by: INTERNAL MEDICINE

## 2024-09-19 PROCEDURE — 3600007502: Performed by: INTERNAL MEDICINE

## 2024-09-19 RX ORDER — EPHEDRINE SULFATE/0.9% NACL/PF 50 MG/5 ML
SYRINGE (ML) INTRAVENOUS
Status: DISCONTINUED | OUTPATIENT
Start: 2024-09-19 | End: 2024-09-19 | Stop reason: SDUPTHER

## 2024-09-19 RX ORDER — SODIUM CHLORIDE 0.9 % (FLUSH) 0.9 %
5-40 SYRINGE (ML) INJECTION PRN
Status: DISCONTINUED | OUTPATIENT
Start: 2024-09-19 | End: 2024-09-19 | Stop reason: HOSPADM

## 2024-09-19 RX ORDER — SODIUM CHLORIDE 0.9 % (FLUSH) 0.9 %
5-40 SYRINGE (ML) INJECTION EVERY 12 HOURS SCHEDULED
Status: DISCONTINUED | OUTPATIENT
Start: 2024-09-19 | End: 2024-09-19 | Stop reason: HOSPADM

## 2024-09-19 RX ORDER — LIDOCAINE HYDROCHLORIDE 20 MG/ML
INJECTION, SOLUTION EPIDURAL; INFILTRATION; INTRACAUDAL; PERINEURAL
Status: DISCONTINUED | OUTPATIENT
Start: 2024-09-19 | End: 2024-09-19 | Stop reason: SDUPTHER

## 2024-09-19 RX ORDER — SODIUM CHLORIDE 9 MG/ML
25 INJECTION, SOLUTION INTRAVENOUS PRN
Status: DISCONTINUED | OUTPATIENT
Start: 2024-09-19 | End: 2024-09-19 | Stop reason: HOSPADM

## 2024-09-19 RX ADMIN — Medication 10 MG: at 07:53

## 2024-09-19 RX ADMIN — PROPOFOL 30 MG: 10 INJECTION, EMULSION INTRAVENOUS at 07:53

## 2024-09-19 RX ADMIN — PROPOFOL 20 MG: 10 INJECTION, EMULSION INTRAVENOUS at 07:48

## 2024-09-19 RX ADMIN — PROPOFOL 30 MG: 10 INJECTION, EMULSION INTRAVENOUS at 07:55

## 2024-09-19 RX ADMIN — LIDOCAINE HYDROCHLORIDE 50 MG: 20 INJECTION, SOLUTION EPIDURAL; INFILTRATION; INTRACAUDAL; PERINEURAL at 07:45

## 2024-09-19 RX ADMIN — PROPOFOL 30 MG: 10 INJECTION, EMULSION INTRAVENOUS at 07:51

## 2024-09-19 RX ADMIN — SODIUM CHLORIDE: 9 INJECTION, SOLUTION INTRAVENOUS at 07:42

## 2024-09-19 RX ADMIN — PROPOFOL 50 MG: 10 INJECTION, EMULSION INTRAVENOUS at 07:45

## 2024-09-19 RX ADMIN — PROPOFOL 30 MG: 10 INJECTION, EMULSION INTRAVENOUS at 07:46

## 2024-09-19 RX ADMIN — PROPOFOL 30 MG: 10 INJECTION, EMULSION INTRAVENOUS at 07:54

## 2024-09-19 ASSESSMENT — PAIN - FUNCTIONAL ASSESSMENT: PAIN_FUNCTIONAL_ASSESSMENT: 0-10

## 2025-05-28 ENCOUNTER — APPOINTMENT (OUTPATIENT)
Facility: HOSPITAL | Age: 75
End: 2025-05-28
Payer: MEDICARE

## 2025-05-28 ENCOUNTER — HOSPITAL ENCOUNTER (EMERGENCY)
Facility: HOSPITAL | Age: 75
Discharge: HOME | End: 2025-05-28
Attending: STUDENT IN AN ORGANIZED HEALTH CARE EDUCATION/TRAINING PROGRAM
Payer: MEDICARE

## 2025-05-28 VITALS
TEMPERATURE: 97.7 F | OXYGEN SATURATION: 97 % | WEIGHT: 168.21 LBS | SYSTOLIC BLOOD PRESSURE: 118 MMHG | HEART RATE: 89 BPM | BODY MASS INDEX: 25.49 KG/M2 | RESPIRATION RATE: 21 BRPM | HEIGHT: 68 IN | DIASTOLIC BLOOD PRESSURE: 67 MMHG

## 2025-05-28 DIAGNOSIS — R41.82 ALTERED MENTAL STATUS, UNSPECIFIED ALTERED MENTAL STATUS TYPE: Primary | ICD-10-CM

## 2025-05-28 DIAGNOSIS — J06.9 VIRAL URI WITH COUGH: ICD-10-CM

## 2025-05-28 LAB
ALBUMIN SERPL-MCNC: 3.8 G/DL (ref 3.5–5)
ALBUMIN/GLOB SERPL: 0.9 (ref 1.1–2.2)
ALP SERPL-CCNC: 56 U/L (ref 45–117)
ALT SERPL-CCNC: 27 U/L (ref 12–78)
ANION GAP SERPL CALC-SCNC: 7 MMOL/L (ref 2–12)
APPEARANCE UR: CLEAR
AST SERPL-CCNC: 23 U/L (ref 15–37)
BACTERIA URNS QL MICRO: NEGATIVE /HPF
BASOPHILS # BLD: 0.03 K/UL (ref 0–0.1)
BASOPHILS NFR BLD: 0.2 % (ref 0–1)
BILIRUB SERPL-MCNC: 1 MG/DL (ref 0.2–1)
BILIRUB UR QL: NEGATIVE
BUN SERPL-MCNC: 16 MG/DL (ref 6–20)
BUN/CREAT SERPL: 11 (ref 12–20)
CALCIUM SERPL-MCNC: 9.2 MG/DL (ref 8.5–10.1)
CHLORIDE SERPL-SCNC: 100 MMOL/L (ref 97–108)
CO2 SERPL-SCNC: 25 MMOL/L (ref 21–32)
COLOR UR: ABNORMAL
CREAT SERPL-MCNC: 1.42 MG/DL (ref 0.7–1.3)
DIFFERENTIAL METHOD BLD: ABNORMAL
EOSINOPHIL # BLD: 0.01 K/UL (ref 0–0.4)
EOSINOPHIL NFR BLD: 0.1 % (ref 0–7)
EPITH CASTS URNS QL MICRO: ABNORMAL /LPF
ERYTHROCYTE [DISTWIDTH] IN BLOOD BY AUTOMATED COUNT: 13.2 % (ref 11.5–14.5)
FLUAV RNA SPEC QL NAA+PROBE: NOT DETECTED
FLUBV RNA SPEC QL NAA+PROBE: NOT DETECTED
GLOBULIN SER CALC-MCNC: 4.1 G/DL (ref 2–4)
GLUCOSE SERPL-MCNC: 112 MG/DL (ref 65–100)
GLUCOSE UR STRIP.AUTO-MCNC: NEGATIVE MG/DL
HCT VFR BLD AUTO: 38.6 % (ref 36.6–50.3)
HGB BLD-MCNC: 13 G/DL (ref 12.1–17)
HGB UR QL STRIP: NEGATIVE
HYALINE CASTS URNS QL MICRO: ABNORMAL /LPF (ref 0–2)
IMM GRANULOCYTES # BLD AUTO: 0.07 K/UL (ref 0–0.04)
IMM GRANULOCYTES NFR BLD AUTO: 0.5 % (ref 0–0.5)
KETONES UR QL STRIP.AUTO: 15 MG/DL
LEUKOCYTE ESTERASE UR QL STRIP.AUTO: NEGATIVE
LYMPHOCYTES # BLD: 0.51 K/UL (ref 0.8–3.5)
LYMPHOCYTES NFR BLD: 3.8 % (ref 12–49)
MAGNESIUM SERPL-MCNC: 2 MG/DL (ref 1.6–2.4)
MCH RBC QN AUTO: 33.6 PG (ref 26–34)
MCHC RBC AUTO-ENTMCNC: 33.7 G/DL (ref 30–36.5)
MCV RBC AUTO: 99.7 FL (ref 80–99)
MONOCYTES # BLD: 0.86 K/UL (ref 0–1)
MONOCYTES NFR BLD: 6.5 % (ref 5–13)
NEUTS SEG # BLD: 11.82 K/UL (ref 1.8–8)
NEUTS SEG NFR BLD: 88.9 % (ref 32–75)
NITRITE UR QL STRIP.AUTO: NEGATIVE
NRBC # BLD: 0 K/UL (ref 0–0.01)
NRBC BLD-RTO: 0 PER 100 WBC
PH UR STRIP: 6 (ref 5–8)
PLATELET # BLD AUTO: 216 K/UL (ref 150–400)
PMV BLD AUTO: 10.5 FL (ref 8.9–12.9)
POTASSIUM SERPL-SCNC: 3.7 MMOL/L (ref 3.5–5.1)
PROT SERPL-MCNC: 7.9 G/DL (ref 6.4–8.2)
PROT UR STRIP-MCNC: NEGATIVE MG/DL
RBC # BLD AUTO: 3.87 M/UL (ref 4.1–5.7)
RBC #/AREA URNS HPF: ABNORMAL /HPF (ref 0–5)
RBC MORPH BLD: ABNORMAL
SARS-COV-2 RNA RESP QL NAA+PROBE: NOT DETECTED
SODIUM SERPL-SCNC: 132 MMOL/L (ref 136–145)
SOURCE: NORMAL
SP GR UR REFRACTOMETRY: 1.02
URINE CULTURE IF INDICATED: ABNORMAL
UROBILINOGEN UR QL STRIP.AUTO: 1 EU/DL (ref 0.2–1)
WBC # BLD AUTO: 13.3 K/UL (ref 4.1–11.1)
WBC URNS QL MICRO: ABNORMAL /HPF (ref 0–4)

## 2025-05-28 PROCEDURE — 81001 URINALYSIS AUTO W/SCOPE: CPT

## 2025-05-28 PROCEDURE — 71045 X-RAY EXAM CHEST 1 VIEW: CPT

## 2025-05-28 PROCEDURE — 83735 ASSAY OF MAGNESIUM: CPT

## 2025-05-28 PROCEDURE — 85025 COMPLETE CBC W/AUTO DIFF WBC: CPT

## 2025-05-28 PROCEDURE — 93005 ELECTROCARDIOGRAM TRACING: CPT

## 2025-05-28 PROCEDURE — 80053 COMPREHEN METABOLIC PANEL: CPT

## 2025-05-28 PROCEDURE — 99285 EMERGENCY DEPT VISIT HI MDM: CPT

## 2025-05-28 PROCEDURE — 87636 SARSCOV2 & INF A&B AMP PRB: CPT

## 2025-05-28 PROCEDURE — 36415 COLL VENOUS BLD VENIPUNCTURE: CPT

## 2025-05-28 RX ORDER — 0.9 % SODIUM CHLORIDE 0.9 %
1000 INTRAVENOUS SOLUTION INTRAVENOUS ONCE
Status: DISCONTINUED | OUTPATIENT
Start: 2025-05-28 | End: 2025-05-29 | Stop reason: HOSPADM

## 2025-05-28 ASSESSMENT — LIFESTYLE VARIABLES
HOW OFTEN DO YOU HAVE A DRINK CONTAINING ALCOHOL: NEVER
HOW MANY STANDARD DRINKS CONTAINING ALCOHOL DO YOU HAVE ON A TYPICAL DAY: PATIENT DOES NOT DRINK

## 2025-05-28 ASSESSMENT — PAIN - FUNCTIONAL ASSESSMENT: PAIN_FUNCTIONAL_ASSESSMENT: 0-10

## 2025-05-28 NOTE — ED PROVIDER NOTES
[facility] EMERGENCY DEPARTMENT  EMERGENCY DEPARTMENT ENCOUNTER       Pt Name: Yoel Izaguirre  MRN: [id number]  Birthdate 1950  Date of evaluation: 5/28/2025  Provider: Romario Benites MD   PCP: Felipa Sanchez APRN - NP  Note Started: 6:24 PM EDT 5/28/25     CHIEF COMPLAINT       Chief Complaint   Patient presents with    Altered Mental Status     BIBEMS from home after family found him to be only responsive to pain; found to be hypotensive; gave 500 ml en route; BP normal now 121/68; . Hx of dementia, CVA, Encephalopathy.        HISTORY OF PRESENT ILLNESS: 1 or more elements      History From: patient, History limited by: none     Yoel Izaguirre is a 74 y.o. male presenting with altered mental status.  Notes he was taking a nap on the couch when his caretaker at home noted he was very difficult to arouse.  EMS arrived and notably he was hypotensive.  He just became more responsive and he was given 500 cc of fluids and is back to his normal baseline self.  Patient has no complaints at this time.  Presents with his brother who notes he is at his baseline he has a history of dementia.  He has been eating and drinking normally however he has had a little bit of a scratchy throat and cough over the past 24 to 48 hours.  No fevers.  Denies dysuria.       Please See MDM for Additional Details of the HPI/PMH  Nursing Notes were all reviewed and agreed with or any disagreements were addressed in the HPI.     REVIEW OF SYSTEMS        Positives and Pertinent negatives as per HPI.    PAST HISTORY     Past Medical History:  Past Medical History:   Diagnosis Date    Hypercholesteremia     Hypertension     Memory disorder     Thyroid disease     hypothyroid       Past Surgical History:  Past Surgical History:   Procedure Laterality Date    COLONOSCOPY N/A 11/15/2018    COLONOSCOPY performed by Bertram Guevara MD at [facility] ENDOSCOPY    COLONOSCOPY N/A 9/19/2024    COLONOSCOPY performed by Ismael  the ED Provider with the findings documented in the MDM section.     Interpretation per the Radiologist below, if available at the time of this note:     XR CHEST PORTABLE   Final Result   No acute cardiopulmonary abnormalities.         Electronically signed by CRISTIAN Warner           PROCEDURES   Unless otherwise noted below, none  Procedures     CRITICAL CARE TIME   none    EMERGENCY DEPARTMENT COURSE and DIFFERENTIAL DIAGNOSIS/MDM   Vitals:    Vitals:    05/28/25 1740 05/28/25 1800 05/28/25 1830 05/28/25 1900   BP: 125/69 131/64 119/66 118/67   Pulse: 85 83 88 89   Resp: 14 15 21 21   Temp: 97.7 °F (36.5 °C)      TempSrc: Oral      SpO2: 98% 97% 96% 97%   Weight: 76.3 kg (168 lb 3.4 oz)      Height: 1.727 m (5' 8\")           Patient was given the following medications:  Medications   sodium chloride 0.9 % bolus 1,000 mL (has no administration in time range)       Medical Decision Making  74yoM with PMH of dementia presenting with altered mental status.  Notes he was taking a nap on the couch when his caretaker at home noted he was very difficult to arouse.  EMS arrived and notably he was hypotensive.  He just became more responsive and he was given 500 cc of fluids and is back to his normal baseline self.  Patient has no complaints at this time.  Presents with his brother who notes he is at his baseline he has a history of dementia.  He has been eating and drinking normally however he has had a little bit of a scratchy throat and cough over the past 24 to 48 hours.  No fevers.  Denies dysuria.    Appears well in no acute distress.  Very pleasant with a normal nonfocal neurological exam at his baseline orientation.    DDx includes hypotension, seizures, arrhythmias, dehydration, electrolyte derangement.  Will obtain basic labs chest x-ray to rule out pneumonia EKG cardiac monitoring to evaluate for any arrhythmias.    The possibly could have just been sleeping more deeper than normal.  Question the hypotension by EMS

## 2025-05-29 NOTE — ED NOTES
Discharge medications reviewed with the patient and guardian and appropriate educational materials and side effects teaching were provided.

## 2025-05-30 LAB
EKG ATRIAL RATE: 76 BPM
EKG DIAGNOSIS: NORMAL
EKG P AXIS: 56 DEGREES
EKG P-R INTERVAL: 164 MS
EKG Q-T INTERVAL: 378 MS
EKG QRS DURATION: 80 MS
EKG QTC CALCULATION (BAZETT): 425 MS
EKG R AXIS: 18 DEGREES
EKG T AXIS: 28 DEGREES
EKG VENTRICULAR RATE: 76 BPM

## 2025-06-26 ENCOUNTER — TRANSCRIBE ORDERS (OUTPATIENT)
Facility: HOSPITAL | Age: 75
End: 2025-06-26

## 2025-06-26 DIAGNOSIS — R97.20 ELEVATED PSA: Primary | ICD-10-CM

## 2025-07-14 ENCOUNTER — HOSPITAL ENCOUNTER (OUTPATIENT)
Facility: HOSPITAL | Age: 75
Discharge: HOME | End: 2025-07-17
Attending: UROLOGY
Payer: MEDICARE

## 2025-07-14 DIAGNOSIS — R97.20 ELEVATED PSA: ICD-10-CM

## 2025-07-14 PROCEDURE — 72197 MRI PELVIS W/O & W/DYE: CPT

## 2025-07-14 PROCEDURE — 6360000004 HC RX CONTRAST MEDICATION: Performed by: UROLOGY

## 2025-07-14 PROCEDURE — A9579 GAD-BASE MR CONTRAST NOS,1ML: HCPCS | Performed by: UROLOGY

## 2025-07-14 RX ORDER — 0.9 % SODIUM CHLORIDE 0.9 %
100 INTRAVENOUS SOLUTION INTRAVENOUS ONCE
Status: DISCONTINUED | OUTPATIENT
Start: 2025-07-14 | End: 2025-07-18 | Stop reason: HOSPADM

## 2025-07-14 RX ORDER — GADOTERIDOL 279.3 MG/ML
15 INJECTION INTRAVENOUS
Status: COMPLETED | OUTPATIENT
Start: 2025-07-14 | End: 2025-07-14

## 2025-07-14 RX ADMIN — Medication 100 ML: at 17:02

## 2025-07-14 RX ADMIN — GADOTERIDOL 15 ML: 279.3 INJECTION, SOLUTION INTRAVENOUS at 17:02

## (undated) DEVICE — CONTAINER SPEC 20 ML LID NEUT BUFF FORMALIN 10 % POLYPR STS

## (undated) DEVICE — SET ADMIN 16ML TBNG L100IN 2 Y INJ SITE IV PIGGY BK DISP

## (undated) DEVICE — ENDOSCOPIC KIT COMPLIANCE ENDOKIT

## (undated) DEVICE — SNARE ENDOSCP POLYP MED STD AD 2.4X27X240 CM 2.8 MM OVL SENS

## (undated) DEVICE — SYR 10ML LUER LOK 1/5ML GRAD --

## (undated) DEVICE — Device

## (undated) DEVICE — CUFF BLD PRSS AD CLTH SGL TB W/ BAYNT CONN ROUNDED CORNER

## (undated) DEVICE — KENDALL RADIOLUCENT FOAM MONITORING ELECTRODE RECTANGULAR SHAPE: Brand: KENDALL

## (undated) DEVICE — CATH IV AUTOGRD BC PNK 20GA 25 -- INSYTE

## (undated) DEVICE — SOLIDIFIER MEDC 1200ML -- CONVERT TO 356117

## (undated) DEVICE — BASIN EMSIS 16OZ GRAPHITE PLAS KID SHP MOLD GRAD FOR ORAL

## (undated) DEVICE — NEEDLE HYPO 18GA L1.5IN PNK S STL HUB POLYPR SHLD REG BVL

## (undated) DEVICE — ELECTRODE PT RET AD L9FT HI MOIST COND ADH HYDRGEL CORDED

## (undated) DEVICE — IV START KIT: Brand: MEDLINE

## (undated) DEVICE — TOWEL 4 PLY TISS 19X30 SUE WHT

## (undated) DEVICE — 1200 GUARD II KIT W/5MM TUBE W/O VAC TUBE: Brand: GUARDIAN

## (undated) DEVICE — SYR 3ML LL TIP 1/10ML GRAD --

## (undated) DEVICE — Z DISCONTINUED PER MEDLINE LINE GAS SAMPLING O2/CO2 LNG AD 13 FT NSL W/ TBNG FILTERLINE

## (undated) DEVICE — SET GRAV CK VLV NEEDLESS ST 3 GANGED 4WAY STPCOCK HI FLO 10

## (undated) DEVICE — NEONATAL-ADULT SPO2 SENSOR: Brand: NELLCOR